# Patient Record
Sex: MALE | Race: WHITE | URBAN - METROPOLITAN AREA
[De-identification: names, ages, dates, MRNs, and addresses within clinical notes are randomized per-mention and may not be internally consistent; named-entity substitution may affect disease eponyms.]

---

## 2017-01-06 ENCOUNTER — MEDICAL CORRESPONDENCE (OUTPATIENT)
Dept: HEALTH INFORMATION MANAGEMENT | Facility: CLINIC | Age: 57
End: 2017-01-06

## 2017-01-11 ENCOUNTER — OFFICE VISIT (OUTPATIENT)
Dept: SURGERY | Facility: CLINIC | Age: 57
End: 2017-01-11
Attending: THORACIC SURGERY (CARDIOTHORACIC VASCULAR SURGERY)
Payer: COMMERCIAL

## 2017-01-11 VITALS
HEART RATE: 86 BPM | SYSTOLIC BLOOD PRESSURE: 145 MMHG | OXYGEN SATURATION: 97 % | WEIGHT: 177.3 LBS | TEMPERATURE: 98.2 F | DIASTOLIC BLOOD PRESSURE: 85 MMHG | HEIGHT: 70 IN | RESPIRATION RATE: 20 BRPM | BODY MASS INDEX: 25.38 KG/M2

## 2017-01-11 DIAGNOSIS — C15.5 MALIGNANT NEOPLASM OF LOWER THIRD OF ESOPHAGUS (H): Primary | ICD-10-CM

## 2017-01-11 PROCEDURE — 99212 OFFICE O/P EST SF 10 MIN: CPT | Mod: ZF

## 2017-01-11 RX ORDER — LORAZEPAM 0.5 MG/1
.5-1 TABLET ORAL
COMMUNITY
Start: 2017-01-05 | End: 2017-04-07

## 2017-01-11 RX ORDER — ONDANSETRON 8 MG/1
8 TABLET, FILM COATED ORAL
COMMUNITY
Start: 2017-01-05 | End: 2017-04-07

## 2017-01-11 RX ORDER — PROCHLORPERAZINE MALEATE 10 MG
10 TABLET ORAL
COMMUNITY
Start: 2017-01-05 | End: 2017-04-07

## 2017-01-11 RX ORDER — DEXAMETHASONE 4 MG/1
4 TABLET ORAL
COMMUNITY
Start: 2017-01-05 | End: 2017-04-07

## 2017-01-11 RX ORDER — LISINOPRIL 5 MG/1
5 TABLET ORAL EVERY EVENING
COMMUNITY
Start: 2016-03-22

## 2017-01-11 NOTE — NURSING NOTE
"Jim Braden is a 56 year old male who presents for:  Chief Complaint   Patient presents with     Oncology Clinic Visit     Malignant Neoplasm of lower third of esophagus        Initial Vitals:  /85 mmHg  Pulse 86  Temp(Src) 98.2  F (36.8  C) (Oral)  Resp 20  Ht 1.778 m (5' 10\")  Wt 80.423 kg (177 lb 4.8 oz)  BMI 25.44 kg/m2  SpO2 97% Estimated body mass index is 25.44 kg/(m^2) as calculated from the following:    Height as of this encounter: 1.778 m (5' 10\").    Weight as of this encounter: 80.423 kg (177 lb 4.8 oz).. Body surface area is 1.99 meters squared. BP completed using cuff size: regular  Data Unavailable No LMP for male patient. Allergies and medications reviewed.     Medications: Medication refills not needed today.  Pharmacy name entered into EPIC: Data Unavailable    Comments:     6 minutes for nursing intake (face to face time)   Rebecca Pastrana CMA          "

## 2017-01-11 NOTE — PROGRESS NOTES
THORACIC SURGERY - NEW PATIENT OFFICE VISIT      Dear Dr. Dan,    I saw Mr. Bradenin consultation for the evaluation and treatment of an adenocarcinoma of the lower third of the esophagus.    HPI  56 year old year-old male with node (+), distal esophageal poorly differentiated adenocarcinoma. He has dysphagia but is managing with dietary adjustments.    Previsit Tests   12/12/16 EGD: upper endoscopy revealed distal esophageal mass, biopsy proven poorly differentiated adenocarcinoma.  12/22/2016 CT-PET : PET + distal esophageal mass with regional adenopathy    12/20/16 EUS: friable fungating mass extending into the cardia, 5cm in length, 15mm thickness, with invasion into periesophageal tissue. At least 10 suspicious nodes (left gastric, peripancreatic, paraesophageal, largest 2.4cm); smaller node sampled and was + for adenocarcioma. uT3N3    PMH  Poorly differentiated esophageal adenocarcinoma  GERD  HTN  Intermittent chronic back pain  History of appendectomy    NKDA    ETOH 2-3 beers/day  TOB quit 10 years ago    Physical examination  BMI   Port in place, otherwise non-contributory. No palpable cervical LN.    From a personal perspective, he is a .    IMPRESSION (C15.5) Malignant neoplasm of lower third of esophagus (H)  (primary encounter diagnosis)    56 year old year-old male with node clinical stage IIIC distal esophageal adenocarcinoma (Siewert II)    PLAN  I spent a total of 30 minutes with Mr. Braden, more than 50% of which were spent in counseling, coordination of care, and face-to-face time. I reviewed the plan as follows:  1) Follow-up in clinic with 1 month after completion of neoadjuvant therapy with a repeat PET-CT and PFT  2) Stop alcohol consumption at least 3 weeks prior to surgery  3) Surgery: we briefly discussed the procedure (esophagectomy) and expected hospital stay and recovery. We will revisit the subject at length when he returns to clinic.    All expressed questions were  answered and all parties present were in agreement with the plan.  I appreciate the opportunity to participate in the care of your patient and will keep you updated.  Sincerely,

## 2017-01-11 NOTE — Clinical Note
1/11/2017       RE: Jim Braden  [address] [address]     Dear Colleague,    Thank you for referring your patient, Jim Braden, to the [facility]. Please see a copy of my visit note below.    THORACIC SURGERY - NEW PATIENT OFFICE VISIT      Dear Dr. Dan,    I saw Mr. Bradenin consultation for the evaluation and treatment of a lower esophageal neoplasm.     HPI  56 year old year-old male with node (+), distal esophageal poorly differentiated adenocarcinoma.     Previsit Tests   12/12/16: upper endoscopy revealed distal esophageal mass, biopsy proven poorly differentiated adenocarcinoma.  CT Chest/abdomen/pelvis: distal esophageal mass with regional adenopathy in the lesser sac and portal caval nodes.  12/20/16 EUS: friable fungating mass extending into the cardia, 5cm in length, 15mm thickness, with invasion into periesophageal tissue. At least 10 suspicious nodes (left gastric, peripancreatic, paraesophageal, largest 2.4cm); smaller node sampled and was + for adenocarcioma.  PMH  Poorly differentiated esophageal adenocarcinoma  GERD  HTN  Intermittent chronic back pain  History of appendectomy    NKDA    FH: amternal uncle had esophageal cancer    No past medical history on file.     ETOH 2-3 beers/day  TOB quit 10 years ago    Physical examination  BMI   Port (placed at [facility])    From a personal perspective, . He is a .    IMPRESSION No diagnosis found.   56 year old year-old male with node (+), distal esophageal poorly differentiated adenocarcinoma    PLAN  I spent a total of *** minutes with Mr. Braden and ***, more than 50% of which were spent in counseling, coordination of care, and face-to-face time. I reviewed the plan as follows:  1.) Procedure planned: ***.  Necessary Preop Tests & Appointments: ***  Regional Anesthesia Plan: ***  Anticoagulation Plan: ***  Smoking Cessation: ***    All expressed questions were answered and all parties present were in  agreement with the plan.  I appreciate the opportunity to participate in the care of your patient and will keep you updated.  Sincerely,    Again, thank you for allowing me to participate in the care of your patient.      Sincerely,    Jeronimo Mendez MD

## 2017-01-11 NOTE — Clinical Note
1/11/2017    RE: Jim Braden  [address] [address]     THORACIC SURGERY - NEW PATIENT OFFICE VISIT      Dear Dr. Dan,    I saw Mr. Bradenin consultation for the evaluation and treatment of an adenocarcinoma of the lower third of the esophagus.    HPI  56 year old year-old male with node (+), distal esophageal poorly differentiated adenocarcinoma. He has dysphagia but is managing with dietary adjustments.    Previsit Tests   12/12/16 EGD: upper endoscopy revealed distal esophageal mass, biopsy proven poorly differentiated adenocarcinoma.  12/22/2016 CT-PET : PET + distal esophageal mass with regional adenopathy    12/20/16 EUS: friable fungating mass extending into the cardia, 5cm in length, 15mm thickness, with invasion into periesophageal tissue. At least 10 suspicious nodes (left gastric, peripancreatic, paraesophageal, largest 2.4cm); smaller node sampled and was + for adenocarcioma. uT3N3    PMH  Poorly differentiated esophageal adenocarcinoma  GERD  HTN  Intermittent chronic back pain  History of appendectomy    NKDA    ETOH 2-3 beers/day  TOB quit 10 years ago    Physical examination  BMI   Port in place, otherwise non-contributory. No palpable cervical LN.    From a personal perspective, he is a .    IMPRESSION (C15.5) Malignant neoplasm of lower third of esophagus (H)  (primary encounter diagnosis)    56 year old year-old male with node clinical stage IIIC distal esophageal adenocarcinoma (Siewert II)    PLAN  I spent a total of 30 minutes with Mr. Braden, more than 50% of which were spent in counseling, coordination of care, and face-to-face time. I reviewed the plan as follows:  1) Follow-up in clinic with 1 month after completion of neoadjuvant therapy with a repeat PET-CT and PFT  2) Stop alcohol consumption at least 3 weeks prior to surgery  3) Surgery: we briefly discussed the procedure (esophagectomy) and expected hospital stay and recovery. We will revisit the subject  at length when he returns to clinic.    All expressed questions were answered and all parties present were in agreement with the plan.  I appreciate the opportunity to participate in the care of your patient and will keep you updated.  Sincerely,    Jeronimo Mendez MD

## 2017-03-14 NOTE — PROGRESS NOTES
THORACIC SURGERY FOLLOW UP VISIT     Dear Dr. Dan,   I saw Mr. Braden in follow-up today. The clinical summary follows:      PREOP DIAGNOSIS   Clinical stage IIIC (uT3N3) Siewert II adenocarcinoma of the lower third of the esophagus    NEOADJUVANT THERAPY  Folfox every 2 weeks x4 cycles (completed 2/15/2017)    COMPLICATIONS FROM NEOADJUVANT THERAPY  Memory difficulties  Peripheral neuropathy (improving)    HISTOPATHOLOGY   12/20/2016 EUS FNA left gastric LN positive for esophageal adenocarcinoma, uT3N3  12/12/2016 EGD esophageal biopsy with poorly differentiated adenocarcinoma     INTERVAL STUDIES  PET CT 2/23/2017 ([facility] report): complete response    PET CT 12/22/2016: cancer at the GE junction with at least 5 LN metastases       ETOH 1 beer daily  TOB former smoker, quit 2007  BMI 26     PMH  Poorly differentiated esophageal adenocarcinoma  Left distal DVT  GERD  HTN  Intermittent chronic back pain  History of open appendectomy     SUBJECTIVE   Mr. Braden is doing well. His dysphagia has completely resolved and he is eating without restriction; he has gained ~10 pounds. He is here to discuss esophagectomy further after completing neoadjuvant chemotherapy. He continues to drink 1 beer daily but has cut down.     From a personal perspective, he is here with his wife, Abril. He works as a . He lives locally with his wife and his children.     IMPRESSION (C15.5) Primary adenocarcinoma of distal third of esophagus (H)  (primary encounter diagnosis)    64 year-old male with clinical stage IIIC Siewert II adenocarcinoma of the esophagus s/p neoadjuvant chemotherapy.     PLAN  I spent a total of 25 min with Mr. Braden and his wife, more than half were spent in counseling, coordination of care, and face-to-face time. I reviewed the plan as follows:  1) Procedure planned: Minimally invasive 3-hole esophagectomy, possible open (I anticipate the abdominal dissection to be more challenging than the chest  dissection and his body habitus is favorable for a cervical anastomosis). I reviewed the indications, risks, and benefits of the procedure with . Jim Braden and his wife, Abril. We discussed the intraoperative risks of bleeding, injury to vital organs, and potential for esophageal discontinuity.  Potential postoperative complications include, but are not limited to, major respiratory events, arrhythmia, bleeding, infection, reoperation, anastomotic leak, conduit necrosis with discontinuity, vocal cord palsy, and death. I explained the anticipated hospital course (10-14 days) and postoperative recovery including pain control, tube feedings, dietary changes, and overall drain management. We discussed the importance of lifetime awareness to limit lifting heavy weights to prevent hiatal herniation as well as the need for aspiration precautions.  2) PFT  3) PAC  4) Perioperative DVT prophylaxis: enoxaparin in preop holding and postoperatively  5) Pain management: intraoperative liposomal bupivacaine injection  They had all their questions answered and were in agreement with the plan.  I appreciate the opportunity to participate in the care of your patient and will keep you updated.  Sincerely,

## 2017-03-15 ENCOUNTER — OFFICE VISIT (OUTPATIENT)
Dept: SURGERY | Facility: CLINIC | Age: 57
End: 2017-03-15
Attending: THORACIC SURGERY (CARDIOTHORACIC VASCULAR SURGERY)
Payer: COMMERCIAL

## 2017-03-15 VITALS
WEIGHT: 180.5 LBS | TEMPERATURE: 98.6 F | SYSTOLIC BLOOD PRESSURE: 161 MMHG | BODY MASS INDEX: 25.84 KG/M2 | OXYGEN SATURATION: 98 % | HEIGHT: 70 IN | DIASTOLIC BLOOD PRESSURE: 86 MMHG | HEART RATE: 94 BPM | RESPIRATION RATE: 18 BRPM

## 2017-03-15 DIAGNOSIS — C15.5 PRIMARY ADENOCARCINOMA OF DISTAL THIRD OF ESOPHAGUS (H): Primary | ICD-10-CM

## 2017-03-15 PROCEDURE — 40000114 ZZH STATISTIC NO CHARGE CLINIC VISIT

## 2017-03-15 ASSESSMENT — PAIN SCALES - GENERAL: PAINLEVEL: NO PAIN (0)

## 2017-03-15 NOTE — MR AVS SNAPSHOT
"              After Visit Summary   3/15/2017    Jim Braden    MRN: [id number]           Patient Information     Date Of Birth          1960        Visit Information        Provider Department      3/15/2017 5:15 PM Jeronimo Mendez MD [facility]        Today's Diagnoses     Primary adenocarcinoma of distal third of esophagus (H)    -  1       Follow-ups after your visit        Who to contact     If you have questions or need follow up information about today's clinic visit or your schedule please contact [facility] directly at [phone].  Normal or non-critical lab and imaging results will be communicated to you by [employer], letter or phone within 4 business days after the clinic has received the results. If you do not hear from us within 7 days, please contact the clinic through [employer] or phone. If you have a critical or abnormal lab result, we will notify you by phone as soon as possible.  Submit refill requests through [employer] or call your pharmacy and they will forward the refill request to us. Please allow 3 business days for your refill to be completed.          Additional Information About Your Visit        MyChart Information     [employer] lets you send messages to your doctor, view your test results, renew your prescriptions, schedule appointments and more. To sign up, go to [email or web address] . Click on \"Log in\" on the left side of the screen, which will take you to the Welcome page. Then click on \"Sign up Now\" on the right side of the page.     You will be asked to enter the access code listed below, as well as some personal information. Please follow the directions to create your username and password.     Your access code is: [email or web address]  Expires: 4/10/2017  1:20 PM     Your access code will  in 90 days. If you need help or a new code, please call your [place] clinic or [phone].        Care EveryWhere ID     This is your Care " "EveryWhere ID. This could be used by other organizations to access your [facility] medical records  [id number]        Your Vitals Were     Pulse Temperature Respirations Height Pulse Oximetry BMI (Body Mass Index)    94 98.6  F (37  C) (Oral) 18 1.778 m (5' 10\") 98% 25.9 kg/m2       Blood Pressure from Last 3 Encounters:   03/15/17 161/86   01/11/17 145/85    Weight from Last 3 Encounters:   03/15/17 81.9 kg (180 lb 8 oz)   01/11/17 80.4 kg (177 lb 4.8 oz)              Today, you had the following     No orders found for display       Primary Care Provider Office Phone # Fax #    Edith Dan [phone]       [place] [address] [address] [facility] [address]        Thank you!     Thank you for choosing [facility]  for your care. Our goal is always to provide you with excellent care. Hearing back from our patients is one way we can continue to improve our services. Please take a few minutes to complete the written survey that you may receive in the mail after your visit with us. Thank you!             Your Updated Medication List - Protect others around you: Learn how to safely use, store and throw away your medicines at www.disposemymeds.org.          This list is accurate as of: 3/15/17 11:59 PM.  Always use your most recent med list.                   Brand Name Dispense Instructions for use    dexamethasone 4 MG tablet    DECADRON     Take 4 mg by mouth Reported on 3/15/2017       enoxaparin 80 MG/0.8ML injection    LOVENOX     Inject 80 mg Subcutaneous 2 times daily       lisinopril 5 MG tablet    PRINIVIL/ZESTRIL     Take 5 mg by mouth Reported on 3/15/2017       LORazepam 0.5 MG tablet    ATIVAN     Take 0.5-1 mg by mouth Reported on 3/15/2017       omeprazole 20 MG CR capsule    priLOSEC     Take 20 mg by mouth Reported on 3/15/2017       ondansetron 8 MG tablet    ZOFRAN     Take 8 mg by mouth Reported on 3/15/2017       prochlorperazine 10 MG tablet    " COMPAZINE     Take 10 mg by mouth Reported on 3/15/2017

## 2017-03-15 NOTE — LETTER
3/15/2017      RE: Jim Braden  [address] [address]       THORACIC SURGERY FOLLOW UP VISIT     Dear Dr. Dan,   I saw Mr. Braden in follow-up today. The clinical summary follows:      PREOP DIAGNOSIS   Clinical stage IIIC (uT3N3) Siewert II adenocarcinoma of the lower third of the esophagus    NEOADJUVANT THERAPY  Folfox every 2 weeks x4 cycles (completed 2/15/2017)    COMPLICATIONS FROM NEOADJUVANT THERAPY  Memory difficulties  Peripheral neuropathy (improving)    HISTOPATHOLOGY   12/20/2016 EUS FNA left gastric LN positive for esophageal adenocarcinoma, uT3N3  12/12/2016 EGD esophageal biopsy with poorly differentiated adenocarcinoma     INTERVAL STUDIES  PET CT 2/23/2017 ([facility] report): complete response    PET CT 12/22/2016: cancer at the GE junction with at least 5 LN metastases       ETOH 1 beer daily  TOB former smoker, quit 2007  BMI 26     PMH  Poorly differentiated esophageal adenocarcinoma  Left distal DVT  GERD  HTN  Intermittent chronic back pain  History of open appendectomy     SUBJECTIVE   Mr. Braden is doing well. His dysphagia has completely resolved and he is eating without restriction; he has gained ~10 pounds. He is here to discuss esophagectomy further after completing neoadjuvant chemotherapy. He continues to drink 1 beer daily but has cut down.     From a personal perspective, he is here with his wife, Abril. He works as a . He lives locally with his wife and his children.     IMPRESSION (C15.5) Primary adenocarcinoma of distal third of esophagus (H)  (primary encounter diagnosis)    64 year-old male with clinical stage IIIC Siewert II adenocarcinoma of the esophagus s/p neoadjuvant chemotherapy.     PLAN  I spent a total of 25 min with Mr. Braden and his wife, more than half were spent in counseling, coordination of care, and face-to-face time. I reviewed the plan as follows:  1) Procedure planned: Minimally invasive 3-hole esophagectomy, possible open (I  anticipate the abdominal dissection to be more challenging than the chest dissection and his body habitus is favorable for a cervical anastomosis). I reviewed the indications, risks, and benefits of the procedure with . Jim Braden and his wife, Abril. We discussed the intraoperative risks of bleeding, injury to vital organs, and potential for esophageal discontinuity.  Potential postoperative complications include, but are not limited to, major respiratory events, arrhythmia, bleeding, infection, reoperation, anastomotic leak, conduit necrosis with discontinuity, vocal cord palsy, and death. I explained the anticipated hospital course (10-14 days) and postoperative recovery including pain control, tube feedings, dietary changes, and overall drain management. We discussed the importance of lifetime awareness to limit lifting heavy weights to prevent hiatal herniation as well as the need for aspiration precautions.  2) PFT  3) PAC  4) Perioperative DVT prophylaxis: enoxaparin in preop holding and postoperatively  5) Pain management: intraoperative liposomal bupivacaine injection  They had all their questions answered and were in agreement with the plan.  I appreciate the opportunity to participate in the care of your patient and will keep you updated.  Sincerely,      Jeronimo Mendez MD

## 2017-03-16 DIAGNOSIS — C15.9 MALIGNANT NEOPLASM OF ESOPHAGUS, UNSPECIFIED LOCATION (H): Primary | ICD-10-CM

## 2017-03-16 RX ORDER — FLUCONAZOLE 2 MG/ML
200 INJECTION, SOLUTION INTRAVENOUS EVERY 24 HOURS
Status: CANCELLED | OUTPATIENT
Start: 2017-03-16 | End: 2017-03-17

## 2017-03-16 RX ORDER — PIPERACILLIN SODIUM, TAZOBACTAM SODIUM 3; .375 G/15ML; G/15ML
3.38 INJECTION, POWDER, LYOPHILIZED, FOR SOLUTION INTRAVENOUS EVERY 6 HOURS
Status: CANCELLED | OUTPATIENT
Start: 2017-03-16

## 2017-04-05 ENCOUNTER — ANESTHESIA EVENT (OUTPATIENT)
Dept: SURGERY | Facility: CLINIC | Age: 57
DRG: 357 | End: 2017-04-05
Payer: COMMERCIAL

## 2017-04-07 ENCOUNTER — OFFICE VISIT (OUTPATIENT)
Dept: SURGERY | Facility: CLINIC | Age: 57
End: 2017-04-07

## 2017-04-07 ENCOUNTER — ALLIED HEALTH/NURSE VISIT (OUTPATIENT)
Dept: SURGERY | Facility: CLINIC | Age: 57
End: 2017-04-07

## 2017-04-07 VITALS
SYSTOLIC BLOOD PRESSURE: 165 MMHG | HEART RATE: 79 BPM | RESPIRATION RATE: 16 BRPM | BODY MASS INDEX: 25.9 KG/M2 | DIASTOLIC BLOOD PRESSURE: 94 MMHG | HEIGHT: 71 IN | OXYGEN SATURATION: 97 % | TEMPERATURE: 97.9 F | WEIGHT: 185 LBS

## 2017-04-07 DIAGNOSIS — Z01.818 PREOP EXAMINATION: ICD-10-CM

## 2017-04-07 DIAGNOSIS — Z01.818 PREOP EXAMINATION: Primary | ICD-10-CM

## 2017-04-07 DIAGNOSIS — C15.9 ESOPHAGEAL CANCER (H): Primary | ICD-10-CM

## 2017-04-07 LAB
ABO + RH BLD: NORMAL
ABO + RH BLD: NORMAL
ANION GAP SERPL CALCULATED.3IONS-SCNC: 8 MMOL/L (ref 3–14)
BLD GP AB SCN SERPL QL: NORMAL
BLOOD BANK CMNT PATIENT-IMP: NORMAL
BLOOD BANK CMNT PATIENT-IMP: NORMAL
BUN SERPL-MCNC: 13 MG/DL (ref 7–30)
CALCIUM SERPL-MCNC: 9.4 MG/DL (ref 8.5–10.1)
CHLORIDE SERPL-SCNC: 106 MMOL/L (ref 94–109)
CO2 SERPL-SCNC: 28 MMOL/L (ref 20–32)
CREAT SERPL-MCNC: 0.89 MG/DL (ref 0.66–1.25)
DLCOUNC-%PRED-PRE: 96 %
DLCOUNC-PRE: 28.92 ML/MIN/MMHG
DLCOUNC-PRED: 29.93 ML/MIN/MMHG
ERV-%PRED-PRE: 19 %
ERV-PRE: 0.28 L
ERV-PRED: 1.44 L
ERYTHROCYTE [DISTWIDTH] IN BLOOD BY AUTOMATED COUNT: 14.3 % (ref 10–15)
EXPTIME-PRE: 7.65 SEC
FEF2575-%PRED-PRE: 69 %
FEF2575-PRE: 2.22 L/SEC
FEF2575-PRED: 3.18 L/SEC
FEFMAX-%PRED-PRE: 106 %
FEFMAX-PRE: 10.45 L/SEC
FEFMAX-PRED: 9.77 L/SEC
FEV1-%PRED-PRE: 90 %
FEV1-PRE: 3.26 L
FEV1FEV6-PRE: 72 %
FEV1FEV6-PRED: 80 %
FEV1FVC-PRE: 72 %
FEV1FVC-PRED: 79 %
FEV1SVC-PRE: 67 %
FEV1SVC-PRED: 68 %
FIFMAX-PRE: 5.34 L/SEC
FRCPLETH-%PRED-PRE: 89 %
FRCPLETH-PRE: 3.24 L
FRCPLETH-PRED: 3.63 L
FVC-%PRED-PRE: 99 %
FVC-PRE: 4.54 L
FVC-PRED: 4.59 L
GFR SERPL CREATININE-BSD FRML MDRD: 88 ML/MIN/1.7M2
GLUCOSE SERPL-MCNC: 108 MG/DL (ref 70–99)
HCT VFR BLD AUTO: 41.2 % (ref 40–53)
HGB BLD-MCNC: 14.1 G/DL (ref 13.3–17.7)
IC-%PRED-PRE: 119 %
IC-PRE: 4.6 L
IC-PRED: 3.84 L
INR PPP: 1.01 (ref 0.86–1.14)
MCH RBC QN AUTO: 32.3 PG (ref 26.5–33)
MCHC RBC AUTO-ENTMCNC: 34.2 G/DL (ref 31.5–36.5)
MCV RBC AUTO: 94 FL (ref 78–100)
MVV-%PRED-PRE: 117 %
MVV-PRE: 176 L/MIN
MVV-PRED: 150 L/MIN
PLATELET # BLD AUTO: 214 10E9/L (ref 150–450)
POTASSIUM SERPL-SCNC: 4.6 MMOL/L (ref 3.4–5.3)
RBC # BLD AUTO: 4.37 10E12/L (ref 4.4–5.9)
RVPLETH-%PRED-PRE: 126 %
RVPLETH-PRE: 2.96 L
RVPLETH-PRED: 2.35 L
SODIUM SERPL-SCNC: 141 MMOL/L (ref 133–144)
SPECIMEN EXP DATE BLD: NORMAL
TLCPLETH-%PRED-PRE: 106 %
TLCPLETH-PRE: 7.84 L
TLCPLETH-PRED: 7.33 L
VA-%PRED-PRE: 83 %
VA-PRE: 5.84 L
VC-%PRED-PRE: 92 %
VC-PRE: 4.88 L
VC-PRED: 5.28 L
WBC # BLD AUTO: 5.8 10E9/L (ref 4–11)

## 2017-04-07 ASSESSMENT — ENCOUNTER SYMPTOMS: ORTHOPNEA: 0

## 2017-04-07 ASSESSMENT — LIFESTYLE VARIABLES: TOBACCO_USE: 1

## 2017-04-07 NOTE — PATIENT INSTRUCTIONS
Preparing for Your Surgery      Name:  Jim Braden   MRN:  [id number]   :  1960   Today's Date:  2017     Arriving for surgery:  Surgery date:  17  Surgery time:  7:30 am  Arrival time:  5:30 am    Please come to:   [facility] [address]  [address] [place]  [address]    -   parking is available in front of the hospital from 5:15 am to 8:00 pm    -  Stop at the Information Desk in the lobby    -   Inform the information person that you are here for surgery. An escort to 3C will be provided. If you would not like an escort, please proceed to 3C on the 3rd floor. [phone]   -  Bring your ID and insurance card.    What can I eat or drink?  -  You may have solid food or milk products until 8 hours prior to your surgery. (11:30 pm 17)  -  You may have water, apple juice, BLACK coffee (NO creamer or nondairy creamer), or 7up/Sprite until 2 hours prior to your surgery. (5:30 am)    Which medicines can I take?        -  Last Enoxaparin dose prior to surgery is on 17 the AM dose. Take prior to 7:30 am.         (Hold Enoxaparin 17 PM dose and HOLD the day of surgery.)  -  Do not bring your own medications to the hospital.    -  Please take these medications the day of surgery:  Omeprazole      Acetaminophen (Tylenol) if needed    How do I prepare myself?  -  Take two showers: one the night before surgery; and one the morning of surgery.         Use Scrubcare or Hibiclens to wash from neck down.  You may use your own shampoo and conditioner. No other hair products.   -  Do NOT use lotion, powder, deodorant, or antiperspirant the day of your surgery.  -  Do NOT wear any jewelry.    -  Begin using Incentive Spirometer 1 week prior to surgery.  Use 4 times per day, up to 5-10 breaths each time.  Bring Incentive Spirometer to hospital.    Questions or Concerns:  If you have questions or concerns, please call the  Preoperative Assessment Center,  Monday-Friday 7AM-7PM:  [phone]    AFTER YOUR SURGERY  Breathing exercises   Breathing exercises help you recover faster. Take deep breaths and let the air out slowly. This will:     Help you wake up after surgery.    Help prevent complications like pneumonia.  Preventing complications will help you go home sooner.   Nausea and vomiting   You may feel sick to your stomach after surgery; if so, let your nurse know.    Pain control:  After surgery, you may have pain. Our goal is to help you manage your pain. Pain medicine will help you feel comfortable enough to do activities that will help you heal.  These activities may include breathing exercises, walking and physical therapy.   To help your health care team treat your pain we will ask: 1) If you have pain  2) where it is located 3) describe your pain in your words  Methods of pain control include medications given by mouth, vein or by nerve block for some surgeries.  We may give you a pain control pump that will:  1) Deliver the medicine through a tube placed in your vein  2) Control the amount of medicine you receive  3) Allow you to push a button to deliver a dose of pain medicine  Sequential Compression Device (SCD) or Pneumo Boots:  You may need to wear SCD S on your legs or feet. These are wraps connected to a machine that pumps in air and releases it. The repeated pumping helps prevent blood clots from forming.

## 2017-04-07 NOTE — PROGRESS NOTES
Preoperative Assessment Center medication history for April 7, 2017 is complete.  See Epic admission navigator for allergy information, pharmacy, prior to admission medications and immunization status.    Operating room staff will still need to confirm medications and last dose information on day of surgery.     Medication history interview sources:  patient    Changes made to PTA medication list (reason)  Added: acetaminophen PRN  Deleted: dexamethasone, lorazepam, prochlorperazine, ondansetron - pt not taking any longer   Changed:  Omeprazole, lisinopril - updated sig,     Additional medication history information (including reliability of information, actions taken by pharmacist):None      Prior to Admission medications    Medication Sig Last Dose Taking? Auth Provider   ACETAMINOPHEN PO Take 500-1,000 mg by mouth every 8 hours as needed for pain Taking Yes Unknown, Entered By History   enoxaparin (LOVENOX) 80 MG/0.8ML injection Inject 80 mg Subcutaneous 2 times daily Taking Yes Reported, Patient   omeprazole (PRILOSEC) 20 MG CR capsule Take 20 mg by mouth every morning Reported on 3/15/2017 Taking Yes Reported, Patient   lisinopril (PRINIVIL/ZESTRIL) 5 MG tablet Take 5 mg by mouth every evening Reported on 3/15/2017 Taking Yes Reported, Patient         Medication history completed by: Miguel Morgan [facility]

## 2017-04-07 NOTE — PHARMACY - PREOPERATIVE ASSESSMENT CENTER
ANTICOAGULATION DOCUMENTATION - Preoperative Assessment Center (PAC) Pharmacist   Patient seen and interviewed during time of PAC Clinic appointment April 7, 2017.     Based on profile review and patient interview Jim Braden has been on enoxaparin 80 mg SQ q12 hr for treatment of DVT in RLE (peroneal vein) since February, 2017.  Current dose of enoxaparin 80 mg SQ q12 hours.    It is prescribed by Dr. Hitchcock with [facility] in [place], [place].  The expected duration of therapy is indefinite.    Jim Braden is scheduled for surgery on 4/14/17 with Dr. Mendez for esophagectomy and the perioperative anticoagulation plan outlined by Dr[facility]'s staff [facility] is hold enoxaparin for 24 hr before surgery (eg. Hold the dose the evening before and the morning of the procedure).  Last dose of the enoxaparin should be the morning of 4/13 and dose should be taken 7AM or earlier on that date.  Resumption of anticoagulation postoperatively as well as any preoperative anticoagulation will be per Dr. Mendez and his team.  Dr Rene's RN Filomena was also notified of this plan and when Dr. Rene returns on Monday will connect with Dr. Mendez's staff directly if there are any further questions regarding the anticoagulation plan.      This plan may require re-assessment and modification by his primary team in the perioperative setting depending on patients clinical situation.        Miguel Morgan [facility]  April 7, 2017  11:14 AM

## 2017-04-07 NOTE — ANESTHESIA PREPROCEDURE EVALUATION
Anesthesia Evaluation     . Pt has had prior anesthetic. Type: General and MAC    No history of anesthetic complications          ROS/MED HX    ENT/Pulmonary:     (+)tobacco use, Past use Quit in 2007 packs/day  , . Other pulmonary disease Alpha 1 antitrypsin deficiency carrier.    Neurologic:  - neg neurologic ROS     Cardiovascular:     (+) hypertension-range: 140s/80s, ---. Taking blood thinners Pt has received instructions: Instructions Given to patient: Patient instructed to hold Lovenox in preparation for surgery. . . :. .      (-) JUNIOR and orthopnea/PND   METS/Exercise Tolerance:  >4 METS   Hematologic: Comments: Blood clot in right leg, diagnosed in January/February.  Thought to be related to chemotherapy.    (+) History of blood clots pt is anticoagulated, -      Musculoskeletal:  - neg musculoskeletal ROS       GI/Hepatic:     (+) GERD Asymptomatic on medication,       Renal/Genitourinary:  - ROS Renal section negative       Endo:  - neg endo ROS       Psychiatric:  - neg psychiatric ROS       Infectious Disease:  - neg infectious disease ROS       Malignancy:   (+) Malignancy History of GI  GI CA  Active status post Chemo,         Other:    - neg other ROS                 Physical Exam      Airway   Mallampati: I  TM distance: >3 FB  Neck ROM: full    Dental   (+) partials    Cardiovascular   Rhythm and rate: regular and normal  (-) carotid bruit is not present and no peripheral edema    Pulmonary    breath sounds clear to auscultation    Other findings: 4/7/17: WBC 5.8; Hgb 14.1; Hct 41.2; Plt 214; INR 1.01  4/7/17: Na 141; K 4.6; Cl 106; Glu 108; BUN 13; Cr 0.89; Ca 9.4    12/12/16 CT Chest/Abdomen/Pelvis:  CONCLUSION:  1. Distal esophageal mass with abnormal local regional lymphadenopathy in the lesser sac and possibly the portacaval chains. ?    12/22/16 PET:  INDICATION: Esophageal cancer staging, initial treatment strategy.?  TECHNIQUE: Serum glucose level 86 mg/dL. One hour post right antecubital  intravenous administration of 11.1 mCi F-18 FDG, PET imaging was performed from the skull base to the mid thighs utilizing attenuation correction with?  concurrent axial CT and PET/CT image fusion. Dose reduction techniques were used.  COMPARISON: CT from 12/12/2016 is reviewed.  FINDINGS:  HEAD AND NECK: Negative.  CHEST: Negative.  ABDOMEN/PELVIS: Markedly FDG avid (SUVmax 19.5) circumferential wall thickening at the gastroesophageal junction spans about 7 cm craniocaudally. At least 4 FDG avid gastrohepatic ligament lymph nodes, the  largest of which measures 2.4 x 2.1 cm (SUVmax 8.8). 1.5 x 0.8 cm (SUVmax 3.1) lymph node posterior to the gastric fundus. Splenule. Trace calcified atherosclerosis. Pelvic phleboliths.  MUSCULOSKELETAL: Mild inflammatory FDG activity about the trochanteric bursae. Minimal degenerative changes in the spine.?  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 at least 5 lymph node metastases in the upper abdomen above the level of the celiac axis             PAC Discussion and Assessment    ASA Classification: 3  Case is suitable for: [place]  Anesthetic techniques and relevant risks discussed: GA  Invasive monitoring and risk discussed: Yes  Types:   Possibility and Risk of blood transfusion discussed: Yes  NPO instructions given:   Additional anesthetic preparation and risks discussed:   Needs early admission to pre-op area:   Other:     PAC Resident/NP Anesthesia Assessment:  Jim Braden is a 56 year old male scheduled to undergo Thoracoscopic Laparoscopic Esophagectomy, Laparoscopic Jejunostomy Tube, Upper Endoscopy, Possible Open on 4/14/17 with Dr. Jeronimo Mendez.    He has the following specific operative considerations:   1.  GERD: Patient instructed to take Prilosec as prescribed.  Consider use of RSI techniques with advanced airway maneuvers.  2.  History of DVT, anticoagulated with Lovenox: Patient instructed to hold Lovenox in preparation  for surgery.  Recommend that this be resumed as soon as possible after surgery.  3.  HTN: Patient instructed to hold Lisinopril the AM of surgery for renal protection.    4.  EKG, type & screen, CBC, BMP today.    Revised Cardiac Risk Index: 0.9% risk of major adverse cardiac event.  Anesthesia considerations: Refer to PAC assessment in the anesthesia records.  VTE risk: 4.5%  ROSA risk: Intermediate  PONV risk score= 1.  (If > 2, anti-emetic intervention is recommended.)        Reviewed and Signed by PAC Mid-Level Provider/Resident  Mid-Level Provider/Resident: Kayli Urbina CNP  Date: 4/7/17  Time: 1803    Attending Anesthesiologist Anesthesia Assessment:  56 year old for thoracoscopic, laparoscopic esophagectomy and jejunostomy. Chart reviewed, patient seen and evaluated; agree with above assessment. Patient does not have significant cardiac history. He is alpha 1 anti trypsinase carrier but  pulmonary PFTs are normal. Patient has history of DVT and is on lovenox, will hold appropriately before surgery.     Patient is appropriate for the planned procedure without further workup or medical management change. The final anesthesia plan will be determined by the physician anesthesiologist caring for the patient on the day of surgery.      Reviewed and Signed by PAC Anesthesiologist  Anesthesiologist: [email or web address]  Date: 4/7/2017  Time:   Pass/Fail: Pass  Disposition:     PAC Pharmacist Assessment:        Pharmacist:   Date:   Time:      Anesthesia Plan      History & Physical Review  History and physical reviewed and following examination; no interval change.    ASA Status:  3 .    NPO Status:  > 8 hours    Plan for General with Intravenous induction. Maintenance will be Balanced.    PONV prophylaxis:  Ondansetron (or other 5HT-3)  Additional equipment: Videolaryngoscope, Arterial Line, Central Line, Double Lumen ETT and 2nd IV I have examined the patient and reviewed the chart.    I have discussed the above plan with the  patient and he agrees to proceed.    Plan for postop analgesia is intercostal nerve blocks by Dr. Mendez and preemptive analgesia by anesthesia team to include IV acetaminophen, ketamine, and dexmetotomidine.    Will we use goal directed fluid therapy with a FloTrac device.    Plan if for post op ICU admission.    Ace Hubbard MD      Postoperative Care  Postoperative pain management:  IV analgesics.      Consents  Anesthetic plan, risks, benefits and alternatives discussed with:  Patient.  Use of blood products discussed: Yes.   Consented to blood products.  .                          .

## 2017-04-07 NOTE — MR AVS SNAPSHOT
After Visit Summary   2017    Jim Braden    MRN: [id number]           Patient Information     Date Of Birth          1960        Visit Information        Provider Department      2017 12:30 PM Rn, [facility] Preoperative Assessment Center        Care Instructions    Preparing for Your Surgery      Name:  Jim Braden   MRN:  [id number]   :  1960   Today's Date:  2017     Arriving for surgery:  Surgery date:  17  Surgery time:  7:30 am  Arrival time:  5:30 am    Please come to:   [facility] [address]  [address] [place]  [address]    -   parking is available in front of the hospital from 5:15 am to 8:00 pm    -  Stop at the Information Desk in the lobby    -   Inform the information person that you are here for surgery. An escort to 3C will be provided. If you would not like an escort, please proceed to 3C on the 3rd floor. [phone]   -  Bring your ID and insurance card.    What can I eat or drink?  -  You may have solid food or milk products until 8 hours prior to your surgery. (11:30 pm 17)  -  You may have water, apple juice, BLACK coffee (NO creamer or nondairy creamer), or 7up/Sprite until 2 hours prior to your surgery. (5:30 am)    Which medicines can I take?        -  Last Enoxaparin dose prior to surgery is on 17 the AM dose. Take prior to 7:30 am.         (Hold Enoxaparin 17 PM dose and HOLD the day of surgery.)  -  Do not bring your own medications to the hospital.    -  Please take these medications the day of surgery:  Omeprazole      Acetaminophen (Tylenol) if needed    How do I prepare myself?  -  Take two showers: one the night before surgery; and one the morning of surgery.         Use Scrubcare or Hibiclens to wash from neck down.  You may use your own shampoo and conditioner. No other hair products.   -  Do NOT use lotion, powder, deodorant, or antiperspirant the day of your  surgery.  -  Do NOT wear any jewelry.    -  Begin using Incentive Spirometer 1 week prior to surgery.  Use 4 times per day, up to 5-10 breaths each time.  Bring Incentive Spirometer to hospital.    Questions or Concerns:  If you have questions or concerns, please call the  Preoperative Assessment Center, Monday-Friday 7AM-7PM:  [phone]    AFTER YOUR SURGERY  Breathing exercises   Breathing exercises help you recover faster. Take deep breaths and let the air out slowly. This will:     Help you wake up after surgery.    Help prevent complications like pneumonia.  Preventing complications will help you go home sooner.   Nausea and vomiting   You may feel sick to your stomach after surgery; if so, let your nurse know.    Pain control:  After surgery, you may have pain. Our goal is to help you manage your pain. Pain medicine will help you feel comfortable enough to do activities that will help you heal.  These activities may include breathing exercises, walking and physical therapy.   To help your health care team treat your pain we will ask: 1) If you have pain  2) where it is located 3) describe your pain in your words  Methods of pain control include medications given by mouth, vein or by nerve block for some surgeries.  We may give you a pain control pump that will:  1) Deliver the medicine through a tube placed in your vein  2) Control the amount of medicine you receive  3) Allow you to push a button to deliver a dose of pain medicine  Sequential Compression Device (SCD) or Pneumo Boots:  You may need to wear SCD S on your legs or feet. These are wraps connected to a machine that pumps in air and releases it. The repeated pumping helps prevent blood clots from forming.                 Follow-ups after your visit        Your next 10 appointments already scheduled     Apr 07, 2017 12:30 PM CDT   (Arrive by 12:15 PM)   PAC RN ASSESSMENT with Rajat Pac Rn   [facility] Preoperative Assessment Center ([facility])    [address]  [address] [facility] [id number] [phone]            Apr 07, 2017  1:10 PM CDT   (Arrive by 12:55 PM)   PAC Anesthesia Consult with  Pac Anesthesiologist   [facility] Preoperative Assessment Center ([facility])    [address] [place] [address]  [address] [facility] [id number] [phone]            Apr 07, 2017  1:30 PM CDT   LAB with  LAB   [facility] Lab ([facility])    [address] [place] [address]  [address] [facility] [id number] [phone]           Patient must bring picture ID.  Patient should be prepared to give a urine specimen  Please do not eat 10-12 hours before your appointment if you are coming in fasting for labs on lipids, cholesterol, or glucose (sugar).  Pregnant women should follow their Care Team instructions. Water with medications is okay. Do not drink coffee or other fluids.   If you have concerns about taking  your medications, please ask at office or if scheduling via [employer], send a message by clicking on Secure Messaging, Message Your Care Team.            Apr 14, 2017   Procedure with Jeronimo Mendez MD   [facility], [place], Same Day Surgery (--)    [address] [facility] [id number] [phone]              Who to contact     Please call your clinic at [phone] to:    Ask questions about your health    Make or cancel appointments    Discuss your medicines    Learn about your test results    Speak to your doctor   If you have compliments or concerns about an experience at your clinic, or if you wish to file a complaint, please contact [facility] Patient Relations at [phone] or email us at [email or web address]         Additional Information About Your Visit        [employer] Information     [employer] is an electronic gateway that provides easy, online access to your medical records. With [employer], you can request a clinic appointment, read  your test results, renew a prescription or communicate with your care team.     To sign up for Jobvitehart visit the website at [email or web address]   You will be asked to enter the access code listed below, as well as some personal information. Please follow the directions to create your username and password.     Your access code is: [email or web address]  Expires: 4/10/2017  1:20 PM     Your access code will  in 90 days. If you need help or a new code, please contact your [facility] or call [phone] for assistance.        Care EveryWhere ID     This is your Care EveryWhere ID. This could be used by other organizations to access your [place] medical records  [id number]         Blood Pressure from Last 3 Encounters:   17 (!) 165/94   03/15/17 161/86   17 145/85    Weight from Last 3 Encounters:   17 83.9 kg (185 lb)   03/15/17 81.9 kg (180 lb 8 oz)   17 80.4 kg (177 lb 4.8 oz)              Today, you had the following     No orders found for display         Today's Medication Changes          These changes are accurate as of: 17 11:57 AM.  If you have any questions, ask your nurse or doctor.               Stop taking these medicines if you haven't already. Please contact your care team if you have questions.     dexamethasone 4 MG tablet   Commonly known as:  DECADRON   Stopped by:  Pharmacist,  Pac           LORazepam 0.5 MG tablet   Commonly known as:  ATIVAN   Stopped by:  Pharmacist,  Pac           ondansetron 8 MG tablet   Commonly known as:  ZOFRAN   Stopped by:  Pharmacist,  Pac           prochlorperazine 10 MG tablet   Commonly known as:  COMPAZINE   Stopped by:  Pharmacist,  Pac                    Primary Care Provider Office Phone # Fax #    Edith Dan [phone]       [address] [address]        Thank you!     Thank you for choosing [facility] PREOPERATIVE ASSESSMENT  CENTER  for your care. Our goal is always to provide you with excellent care. Hearing back from our patients is one way we can continue to improve our services. Please take a few minutes to complete the written survey that you may receive in the mail after your visit with us. Thank you!             Your Updated Medication List - Protect others around you: Learn how to safely use, store and throw away your medicines at www.disposemymeds.org.          This list is accurate as of: 4/7/17 11:57 AM.  Always use your most recent med list.                   Brand Name Dispense Instructions for use    ACETAMINOPHEN PO      Take 500-1,000 mg by mouth every 8 hours as needed for pain       enoxaparin 80 MG/0.8ML injection    LOVENOX     Inject 80 mg Subcutaneous 2 times daily       lisinopril 5 MG tablet    PRINIVIL/ZESTRIL     Take 5 mg by mouth every evening Reported on 3/15/2017       omeprazole 20 MG CR capsule    priLOSEC     Take 20 mg by mouth every morning Reported on 3/15/2017

## 2017-04-07 NOTE — MR AVS SNAPSHOT
After Visit Summary   4/7/2017    Jim Braden    MRN: [id number]           Patient Information     Date Of Birth          1960        Visit Information        Provider Department      4/7/2017 11:00 AM Pharmacist, Rajat [facility] [place]        Today's Diagnoses     Preop examination    -  1       Follow-ups after your visit        Your next 10 appointments already scheduled     Apr 07, 2017 11:30 AM CDT   (Arrive by 11:15 AM)   PAC EVALUATION with Rajat Pac Josefina 3   [facility] [place] ([facility])    [address] [place] [address]   [phone]            Apr 07, 2017 12:30 PM CDT   (Arrive by 12:15 PM)   PAC RN ASSESSMENT with Rajat Pac Rn   [facility] [place] ([facility])    [address] [place] [address]   [phone]            Apr 07, 2017  1:10 PM CDT   (Arrive by 12:55 PM)   PAC Anesthesia Consult with Rajat Pac Anesthesiologist   [facility] ([facility])    [address] [place] [address]   [phone]            Apr 07, 2017  1:30 PM CDT   LAB with RAJAT LAB   [facility])    [address] [place] [address]   [phone]           Patient must bring picture ID.  Patient should be prepared to give a urine specimen  Please do not eat 10-12 hours before your appointment if you are coming in fasting for labs on lipids, cholesterol, or glucose (sugar).  Pregnant women should follow their Care Team instructions. Water with medications is okay. Do not drink coffee or other fluids.   If you have concerns about taking  your medications, please ask at office or if scheduling via [employer], send a message by clicking on Secure Messaging, Message Your Care Team.            Apr 14, 2017   Procedure  with Jeronimo Mendez MD   [facility], [place], Same Day Surgery (--)    [address]  [place] [address] [phone]              Who to contact     Please call your clinic at [phone] to:    Ask questions about your health    Make or cancel appointments    Discuss your medicines    Learn about your test results    Speak to your doctor   If you have compliments or concerns about an experience at your clinic, or if you wish to file a complaint, please contact [facility] Patient Relations at [phone] or email us at [email or web address]         Additional Information About Your Visit        Say2meharMoSo Information     [employer] Gamest is an electronic gateway that provides easy, online access to your medical records. With SimpleDeal, you can request a clinic appointment, read your test results, renew a prescription or communicate with your care team.     To sign up for SimpleDeal visit the website at [email or web address]"   You will be asked to enter the access code listed below, as well as some personal information. Please follow the directions to create your username and password.     Your access code is: [email or web address]  Expires: 4/10/2017  1:20 PM     Your access code will  in 90 days. If you need help or a new code, please contact your [facility] or call [phone] for assistance.        Care EveryWhere ID     This is your Care EveryWhere ID. This could be used by other organizations to access your [place] medical records  [id number]         Blood Pressure from Last 3 Encounters:   03/15/17 161/86   17 145/85    Weight from Last 3 Encounters:   03/15/17 81.9 kg (180 lb 8 oz)   17 80.4 kg (177 lb 4.8 oz)              Today, you had the following     No orders found for display         Today's Medication Changes          These changes are accurate as of: 17 11:12 AM.  If you have any questions, ask your nurse or doctor.                Stop taking these medicines if you haven't already. Please contact your care team if you have questions.     dexamethasone 4 MG tablet   Commonly known as:  DECADRON   Stopped by:  Rajat Camacho           LORazepam 0.5 MG tablet   Commonly known as:  ATIVAN   Stopped by:  Pharmacist, Uc Pac           ondansetron 8 MG tablet   Commonly known as:  ZOFRAN   Stopped by:  Pharmacist, Uc Pac           prochlorperazine 10 MG tablet   Commonly known as:  COMPAZINE   Stopped by:  Rajat Camacho                    Primary Care Provider Office Phone # Fax #    Edith Dan [phone]       [address] [address]        Thank you!     Thank you for choosing [facility] PREOPERATIVE ASSESSMENT [place]  for your care. Our goal is always to provide you with excellent care. Hearing back from our patients is one way we can continue to improve our services. Please take a few minutes to complete the written survey that you may receive in the mail after your visit with us. Thank you!             Your Updated Medication List - Protect others around you: Learn how to safely use, store and throw away your medicines at www.disposemymeds.org.          This list is accurate as of: 4/7/17 11:12 AM.  Always use your most recent med list.                   Brand Name Dispense Instructions for use    ACETAMINOPHEN PO      Take 500-1,000 mg by mouth every 8 hours as needed for pain       enoxaparin 80 MG/0.8ML injection    LOVENOX     Inject 80 mg Subcutaneous 2 times daily       lisinopril 5 MG tablet    PRINIVIL/ZESTRIL     Take 5 mg by mouth every evening Reported on 3/15/2017       omeprazole 20 MG CR capsule    priLOSEC     Take 20 mg by mouth every morning Reported on 3/15/2017

## 2017-04-07 NOTE — H&P
Pre-Operative H & P     Date of Encounter: 4/7/2017  Primary Care Physician:  Edith Dan    CC: Distal esophageal adenocarcinoma.      HPI:  Jim Braden is a 56 year old male who presents for pre-operative H & P in preparation for Thoracoscopic Laparoscopic Esophagectomy, Laparoscopic Jejunostomy Tube, Upper Endoscopy, Possible Open on 4/14/17 with Dr. Jeronimo Mendez at [facility].     The patient was first evaluated by Dr. Mendez on 1/11/17 in regards to distal esophageal adenocarcinoma. At that time, tentative arrangements were made for surgery, after the patient had completed neoadjuvant therapy. The patient was also instructed to abstain from alcohol for at least 3 weeks prior to surgery. He was reevaluated on 3/15, and had completed his Folfox chemotherapy on 2/15. Arrangements are now being made for the above procedures.  History is obtained from the patient and the medical record.    Past Medical History:  Past Medical History:   Diagnosis Date     Alpha-1-antitrypsin deficiency carrier (H)      Esophageal cancer (H)      GERD (gastroesophageal reflux disease)      HTN (hypertension)      Leg DVT (deep venous thromboembolism), acute, right (H)      Past Surgical History:  Past Surgical History:   Procedure Laterality Date     APPENDECTOMY       ORTHOPEDIC SURGERY      Left arm after fracture     Hx of Blood transfusions/reactions: Denies.     Hx of abnormal bleeding or anti-platelet use: Patient instructed to hold Lovenox in preparation for surgery.    Menstrual history: No LMP for male patient.    Steroid use in the last year: Denies.    Personal or FH of difficulty with anesthesia: Denies.    Prior to admission medications  Current Outpatient Prescriptions   Medication Sig Dispense Refill     ACETAMINOPHEN PO Take 500-1,000 mg by mouth every 8 hours as needed for pain       enoxaparin (LOVENOX) 80 MG/0.8ML injection Inject 80 mg Subcutaneous 2 times daily        omeprazole (PRILOSEC) 20 MG CR capsule Take 20 mg by mouth every morning Reported on 3/15/2017       lisinopril (PRINIVIL/ZESTRIL) 5 MG tablet Take 5 mg by mouth every evening Reported on 3/15/2017       Allergies  No Known Allergies    Social History  Social History     Social History     Marital status:      Spouse name: N/A     Number of children: N/A     Years of education: N/A     Occupational History     Not on file.     Social History Main Topics     Smoking status: Former Smoker     Packs/day: 1.00     Types: Cigarettes     Smokeless tobacco: Not on file      Comment: Quit 2007     Alcohol use 0.6 - 1.2 oz/week     0 Standard drinks or equivalent, 1 - 2 Cans of beer per week      Comment: 1-2 cans of beer day     Drug use: No     Sexual activity: Not on file     Other Topics Concern     Not on file     Social History Narrative     Family History  Family History   Problem Relation Age of Onset     Other - See Comments Mother      Alpha-1 Antitrypsin Deficiency     Dementia Father      Review of Systems  Functional status: Independent in ADL's.  >4 METS.     The complete review of systems is negative other than noted in the HPI or here.   Constitutional: Denies recent changes in sleeping patterns, or fevers/chills.  Reports that he is regaining the weight he lost while he was being worked up for esophageal cancer.    Eyes: Glasses for vision correction.  No recent vision changes.  EENT: Denies recent changes in hearing, mouth pain, or difficulty swallowing.  Cardiovascular: Denies chest pain, JUNIOR or orthopnea, or palpitations.  Respiratory: Denies shortness of breath or significant cough.    GI: Denies nausea/vomiting or diarrhea/constipation.    : Denies dysuria.    Musculoskeletal: Denies joint pain or swelling.    Skin: Denies rashes or wounds.    Hematologic: Denies easy bruising or bleeding.    Neurologic: Denies migraines, seizures, dizziness, numbness/tingling.  Psychiatric: Denies changes  "in mood or affect.      BP (!) 165/94  Pulse 79  Temp 97.9  F (36.6  C) (Oral)  Resp 16  Ht 1.803 m (5' 11\")  Wt 83.9 kg (185 lb)  SpO2 97%  BMI 25.8 kg/m2    185 lbs 0 oz  5' 11\"   Body mass index is 25.8 kg/(m^2).    Physical Exam  Constitutional: Patient awake, seated upright in a chair, in no apparent distress.  Appears stated age.  Eyes: Pupils equal, round and reactive to light.  Extra ocular muscles intact. Sclera clear.  Conjunctiva normal.  HENT: Head normocephalic.  Oral pharynx intact with moist mucous membranes.  Dentition with a lower partial.  No thyromegaly appreciated.   Respiratory: Port in place over the right anterior chest wall.  Lung sounds clear to auscultation bilaterally.  No rales, rhonchi, or wheezing noted.    Cardiovascular: S1, S2, regular rate and rhythm.  No murmurs, rubs, or gallops noted. No carotid bruits auscultated.  Radial and pedal pulses palpable, bilaterally.  No edema noted.   GI: Bowel sounds present.  Abdomen rounded, soft, non-tender to light palpation.  No hepatosplenomegaly or masses palpated.   Genitourinary: Exam deferred.  Lymph/Hematologic: No cervical or supraclavicular lymphadenopathy noted.  No excessive bruising noted.    Skin: Color appropriate for race, warm, dry.  No rashes or wounds at anticipated surgical site.   Musculoskeletal: Full extension of the neck.  No redness, warmth, or swelling of the joints noted. Gross motor strength is normal.    Neurologic: Alert, oriented to name, place and time. Cranial nerves II-XII are grossly intact. Gait is normal.      Neuropsychiatric: Calm, cooperative. Normal affect.     Labs:  17: WBC 5.8; Hgb 14.1; Hct 41.2; Plt 214; INR 1.01  17: Na 141; K 4.6; Cl 106; Glu 108; BUN 13; Cr 0.89; Ca 9.4    Imagin16 CT Chest/Abdomen/Pelvis:  CONCLUSION:  1. Distal esophageal mass with abnormal local regional lymphadenopathy in the lesser sac and possibly the portacaval chains. ?    16 PET:  INDICATION: " Esophageal cancer staging, initial treatment strategy.?  TECHNIQUE: Serum glucose level 86 mg/dL. One hour post right antecubital intravenous administration of 11.1 mCi F-18 FDG, PET imaging was performed from the skull base to the mid thighs utilizing attenuation correction with?  concurrent axial CT and PET/CT image fusion. Dose reduction techniques were used.  COMPARISON: CT from 12/12/2016 is reviewed.?  FINDINGS:?  HEAD AND NECK: Negative.?  CHEST: Negative.?  ABDOMEN/PELVIS: Markedly FDG avid (SUVmax 19.5) circumferential wall thickening at the gastroesophageal junction spans about 7 cm craniocaudally. At least 4 FDG avid gastrohepatic ligament lymph nodes, the?  largest of which measures 2.4 x 2.1 cm (SUVmax 8.8). 1.5 x 0.8 cm (SUVmax 3.1) lymph node posterior to the gastric fundus. Splenule. Trace calcified atherosclerosis. Pelvic phleboliths.?  MUSCULOSKELETAL: Mild inflammatory FDG activity about the trochanteric bursae. Minimal degenerative changes in the spine.?  CONCLUSION:?  Findings consistent with cancer at the esophagogastric junction with at least 5 lymph node metastases in the upper abdomen above the level of the celiac axis    4/17/17 PFT:    FVC-Pred   Date Value Ref Range Status   04/07/2017 4.59 L      FVC-Pre   Date Value Ref Range Status   04/07/2017 4.54 L      FVC-%Pred-Pre   Date Value Ref Range Status   04/07/2017 99 %      FEV1-Pre   Date Value Ref Range Status   04/07/2017 3.26 L      FEV1-%Pred-Pre   Date Value Ref Range Status   04/07/2017 90 %      FEV1FVC-Pred   Date Value Ref Range Status   04/07/2017 79 %      FEV1FVC-Pre   Date Value Ref Range Status   04/07/2017 72 %      No results found for: 20029  FEFMax-Pred   Date Value Ref Range Status   04/07/2017 9.77 L/sec      FEFMax-Pre   Date Value Ref Range Status   04/07/2017 10.45 L/sec      FEFMax-%Pred-Pre   Date Value Ref Range Status   04/07/2017 106 %      ExpTime-Pre   Date Value Ref Range Status   04/07/2017 7.65 sec       FIFMax-Pre   Date Value Ref Range Status   04/07/2017 5.34 L/sec      FEV1FEV6-Pred   Date Value Ref Range Status   04/07/2017 80 %      FEV1FEV6-Pre   Date Value Ref Range Status   04/07/2017 72 %        4/7/17 EKG: Personally reviewed and interpreted as sinus bradycardia.  Formal cardiology read pending.      Lab results, EKG were personally reviewed by this provider.        Assessment and Plan  Jim Braden is a 56 year old male scheduled to undergo Thoracoscopic Laparoscopic Esophagectomy, Laparoscopic Jejunostomy Tube, Upper Endoscopy, Possible Open on 4/14/17 with Dr. Jeronimo Mendez.    He has the following specific operative considerations:   1.  GERD: Patient instructed to take Prilosec as prescribed.  Consider use of RSI techniques with advanced airway maneuvers.  2.  History of DVT, anticoagulated with Lovenox: Patient instructed to hold Lovenox in preparation for surgery.  Recommend that this be resumed as soon as possible after surgery.  3.  HTN: Patient instructed to hold Lisinopril the AM of surgery for renal protection.    4.  EKG, type & screen, CBC, BMP today.    Revised Cardiac Risk Index: 0.9% risk of major adverse cardiac event.  Anesthesia considerations: Refer to PAC assessment in the anesthesia records.  VTE risk: 4.5%  ROSA risk: Intermediate  PONV risk score= 1.  (If > 2, anti-emetic intervention is recommended.)    Patient was discussed with Dr. Madrid.    Kayli Urbina NP  Preoperative Assessment Center  [facility]  Phone: [phone]  Fax: [phone]

## 2017-04-14 ENCOUNTER — HOSPITAL ENCOUNTER (INPATIENT)
Facility: CLINIC | Age: 57
LOS: 17 days | Discharge: HOME HEALTH | DRG: 357 | End: 2017-05-01
Attending: THORACIC SURGERY (CARDIOTHORACIC VASCULAR SURGERY) | Admitting: THORACIC SURGERY (CARDIOTHORACIC VASCULAR SURGERY)
Payer: COMMERCIAL

## 2017-04-14 ENCOUNTER — APPOINTMENT (OUTPATIENT)
Dept: GENERAL RADIOLOGY | Facility: CLINIC | Age: 57
DRG: 357 | End: 2017-04-14
Attending: THORACIC SURGERY (CARDIOTHORACIC VASCULAR SURGERY)
Payer: COMMERCIAL

## 2017-04-14 ENCOUNTER — ANESTHESIA (OUTPATIENT)
Dept: SURGERY | Facility: CLINIC | Age: 57
DRG: 357 | End: 2017-04-14
Payer: COMMERCIAL

## 2017-04-14 DIAGNOSIS — Z98.890 HISTORY OF ESOPHAGECTOMY: Primary | ICD-10-CM

## 2017-04-14 DIAGNOSIS — Z86.718 HISTORY OF DEEP VENOUS THROMBOSIS: ICD-10-CM

## 2017-04-14 DIAGNOSIS — Z90.49 HISTORY OF ESOPHAGECTOMY: Primary | ICD-10-CM

## 2017-04-14 LAB
ABO + RH BLD: NORMAL
ABO + RH BLD: NORMAL
ALBUMIN SERPL-MCNC: 3.8 G/DL (ref 3.4–5)
ALP SERPL-CCNC: 60 U/L (ref 40–150)
ALT SERPL W P-5'-P-CCNC: 218 U/L (ref 0–70)
ANION GAP SERPL CALCULATED.3IONS-SCNC: 12 MMOL/L (ref 3–14)
AST SERPL W P-5'-P-CCNC: 148 U/L (ref 0–45)
BASE DEFICIT BLDA-SCNC: 2.5 MMOL/L
BASE DEFICIT BLDA-SCNC: 5.5 MMOL/L
BASE DEFICIT BLDA-SCNC: 5.6 MMOL/L
BASE DEFICIT BLDA-SCNC: 6.1 MMOL/L
BASE DEFICIT BLDA-SCNC: 6.9 MMOL/L
BASE DEFICIT BLDA-SCNC: 7.2 MMOL/L
BASE DEFICIT BLDA-SCNC: 7.3 MMOL/L
BILIRUB SERPL-MCNC: 0.9 MG/DL (ref 0.2–1.3)
BLD GP AB SCN SERPL QL: NORMAL
BLD PROD TYP BPU: NORMAL
BLOOD BANK CMNT PATIENT-IMP: NORMAL
BUN SERPL-MCNC: 21 MG/DL (ref 7–30)
CA-I BLD-MCNC: 4.5 MG/DL (ref 4.4–5.2)
CA-I BLD-MCNC: 4.6 MG/DL (ref 4.4–5.2)
CA-I BLD-MCNC: 4.8 MG/DL (ref 4.4–5.2)
CA-I BLD-MCNC: 5 MG/DL (ref 4.4–5.2)
CALCIUM SERPL-MCNC: 7.9 MG/DL (ref 8.5–10.1)
CHLORIDE SERPL-SCNC: 111 MMOL/L (ref 94–109)
CO2 SERPL-SCNC: 19 MMOL/L (ref 20–32)
CREAT SERPL-MCNC: 1.07 MG/DL (ref 0.66–1.25)
ERYTHROCYTE [DISTWIDTH] IN BLOOD BY AUTOMATED COUNT: 14.6 % (ref 10–15)
GFR SERPL CREATININE-BSD FRML MDRD: 72 ML/MIN/1.7M2
GLUCOSE BLD-MCNC: 127 MG/DL (ref 70–99)
GLUCOSE BLD-MCNC: 138 MG/DL (ref 70–99)
GLUCOSE BLD-MCNC: 139 MG/DL (ref 70–99)
GLUCOSE BLD-MCNC: 142 MG/DL (ref 70–99)
GLUCOSE BLD-MCNC: 155 MG/DL (ref 70–99)
GLUCOSE BLD-MCNC: 206 MG/DL (ref 70–99)
GLUCOSE SERPL-MCNC: 150 MG/DL (ref 70–99)
HCO3 BLD-SCNC: 19 MMOL/L (ref 21–28)
HCO3 BLD-SCNC: 19 MMOL/L (ref 21–28)
HCO3 BLD-SCNC: 20 MMOL/L (ref 21–28)
HCO3 BLD-SCNC: 20 MMOL/L (ref 21–28)
HCO3 BLD-SCNC: 21 MMOL/L (ref 21–28)
HCO3 BLD-SCNC: 22 MMOL/L (ref 21–28)
HCO3 BLD-SCNC: 24 MMOL/L (ref 21–28)
HCT VFR BLD AUTO: 36.9 % (ref 40–53)
HGB BLD-MCNC: 11.9 G/DL (ref 13.3–17.7)
HGB BLD-MCNC: 12.1 G/DL (ref 13.3–17.7)
HGB BLD-MCNC: 12.2 G/DL (ref 13.3–17.7)
HGB BLD-MCNC: 12.3 G/DL (ref 13.3–17.7)
HGB BLD-MCNC: 12.4 G/DL (ref 13.3–17.7)
HGB BLD-MCNC: 12.7 G/DL (ref 13.3–17.7)
HGB BLD-MCNC: 13.1 G/DL (ref 13.3–17.7)
INR PPP: 1.1 (ref 0.86–1.14)
MAGNESIUM SERPL-MCNC: 1.9 MG/DL (ref 1.6–2.3)
MCH RBC QN AUTO: 31.7 PG (ref 26.5–33)
MCHC RBC AUTO-ENTMCNC: 32.8 G/DL (ref 31.5–36.5)
MCV RBC AUTO: 97 FL (ref 78–100)
MRSA DNA SPEC QL NAA+PROBE: NORMAL
NUM BPU REQUESTED: 2
O2/TOTAL GAS SETTING VFR VENT: 44 %
O2/TOTAL GAS SETTING VFR VENT: 56 %
O2/TOTAL GAS SETTING VFR VENT: 60 %
O2/TOTAL GAS SETTING VFR VENT: 94 %
O2/TOTAL GAS SETTING VFR VENT: ABNORMAL %
PCO2 BLD: 41 MM HG (ref 35–45)
PCO2 BLD: 41 MM HG (ref 35–45)
PCO2 BLD: 42 MM HG (ref 35–45)
PCO2 BLD: 43 MM HG (ref 35–45)
PCO2 BLD: 45 MM HG (ref 35–45)
PCO2 BLD: 46 MM HG (ref 35–45)
PCO2 BLD: 58 MM HG (ref 35–45)
PH BLD: 7.19 PH (ref 7.35–7.45)
PH BLD: 7.24 PH (ref 7.35–7.45)
PH BLD: 7.28 PH (ref 7.35–7.45)
PH BLD: 7.28 PH (ref 7.35–7.45)
PH BLD: 7.29 PH (ref 7.35–7.45)
PH BLD: 7.3 PH (ref 7.35–7.45)
PH BLD: 7.32 PH (ref 7.35–7.45)
PLATELET # BLD AUTO: 144 10E9/L (ref 150–450)
PLATELET # BLD AUTO: 168 10E9/L (ref 150–450)
PO2 BLD: 115 MM HG (ref 80–105)
PO2 BLD: 126 MM HG (ref 80–105)
PO2 BLD: 142 MM HG (ref 80–105)
PO2 BLD: 146 MM HG (ref 80–105)
PO2 BLD: 149 MM HG (ref 80–105)
PO2 BLD: 85 MM HG (ref 80–105)
PO2 BLD: 98 MM HG (ref 80–105)
POTASSIUM BLD-SCNC: 4.2 MMOL/L (ref 3.4–5.3)
POTASSIUM BLD-SCNC: 4.7 MMOL/L (ref 3.4–5.3)
POTASSIUM BLD-SCNC: 4.7 MMOL/L (ref 3.4–5.3)
POTASSIUM BLD-SCNC: 5 MMOL/L (ref 3.4–5.3)
POTASSIUM BLD-SCNC: 5.2 MMOL/L (ref 3.4–5.3)
POTASSIUM BLD-SCNC: 5.3 MMOL/L (ref 3.4–5.3)
POTASSIUM SERPL-SCNC: 4.8 MMOL/L (ref 3.4–5.3)
PROT SERPL-MCNC: 6.5 G/DL (ref 6.8–8.8)
RBC # BLD AUTO: 3.82 10E12/L (ref 4.4–5.9)
SODIUM BLD-SCNC: 139 MMOL/L (ref 133–144)
SODIUM BLD-SCNC: 139 MMOL/L (ref 133–144)
SODIUM BLD-SCNC: 140 MMOL/L (ref 133–144)
SODIUM BLD-SCNC: 141 MMOL/L (ref 133–144)
SODIUM BLD-SCNC: 141 MMOL/L (ref 133–144)
SODIUM BLD-SCNC: 142 MMOL/L (ref 133–144)
SODIUM SERPL-SCNC: 142 MMOL/L (ref 133–144)
SPECIMEN EXP DATE BLD: NORMAL
SPECIMEN SOURCE: NORMAL
WBC # BLD AUTO: 8.5 10E9/L (ref 4–11)

## 2017-04-14 PROCEDURE — 25800025 ZZH RX 258: Performed by: NURSE ANESTHETIST, CERTIFIED REGISTERED

## 2017-04-14 PROCEDURE — 25000125 ZZHC RX 250: Performed by: NURSE ANESTHETIST, CERTIFIED REGISTERED

## 2017-04-14 PROCEDURE — 85049 AUTOMATED PLATELET COUNT: CPT | Performed by: INTERNAL MEDICINE

## 2017-04-14 PROCEDURE — C9290 INJ, BUPIVACAINE LIPOSOME: HCPCS | Performed by: THORACIC SURGERY (CARDIOTHORACIC VASCULAR SURGERY)

## 2017-04-14 PROCEDURE — 0BJ08ZZ INSPECTION OF TRACHEOBRONCHIAL TREE, VIA NATURAL OR ARTIFICIAL OPENING ENDOSCOPIC: ICD-10-PCS | Performed by: THORACIC SURGERY (CARDIOTHORACIC VASCULAR SURGERY)

## 2017-04-14 PROCEDURE — 36000070 ZZH SURGERY LEVEL 5 EA 15 ADDTL MIN - UMMC: Performed by: THORACIC SURGERY (CARDIOTHORACIC VASCULAR SURGERY)

## 2017-04-14 PROCEDURE — 25000132 ZZH RX MED GY IP 250 OP 250 PS 637: Performed by: NURSE ANESTHETIST, CERTIFIED REGISTERED

## 2017-04-14 PROCEDURE — 25000125 ZZHC RX 250: Performed by: SURGERY

## 2017-04-14 PROCEDURE — 25800025 ZZH RX 258: Performed by: SURGERY

## 2017-04-14 PROCEDURE — 88331 PATH CONSLTJ SURG 1 BLK 1SPC: CPT | Performed by: THORACIC SURGERY (CARDIOTHORACIC VASCULAR SURGERY)

## 2017-04-14 PROCEDURE — 82803 BLOOD GASES ANY COMBINATION: CPT | Performed by: ANESTHESIOLOGY

## 2017-04-14 PROCEDURE — 37000009 ZZH ANESTHESIA TECHNICAL FEE, EACH ADDTL 15 MIN: Performed by: THORACIC SURGERY (CARDIOTHORACIC VASCULAR SURGERY)

## 2017-04-14 PROCEDURE — 0W9B40Z DRAINAGE OF LEFT PLEURAL CAVITY WITH DRAINAGE DEVICE, PERCUTANEOUS ENDOSCOPIC APPROACH: ICD-10-PCS | Performed by: THORACIC SURGERY (CARDIOTHORACIC VASCULAR SURGERY)

## 2017-04-14 PROCEDURE — 25000132 ZZH RX MED GY IP 250 OP 250 PS 637: Performed by: SURGERY

## 2017-04-14 PROCEDURE — 25000125 ZZHC RX 250: Performed by: ANESTHESIOLOGY

## 2017-04-14 PROCEDURE — 84295 ASSAY OF SERUM SODIUM: CPT | Performed by: ANESTHESIOLOGY

## 2017-04-14 PROCEDURE — 99291 CRITICAL CARE FIRST HOUR: CPT | Mod: GC | Performed by: INTERNAL MEDICINE

## 2017-04-14 PROCEDURE — C1769 GUIDE WIRE: HCPCS | Performed by: THORACIC SURGERY (CARDIOTHORACIC VASCULAR SURGERY)

## 2017-04-14 PROCEDURE — 20000004 ZZH R&B ICU UMMC

## 2017-04-14 PROCEDURE — 0DJ68ZZ INSPECTION OF STOMACH, VIA NATURAL OR ARTIFICIAL OPENING ENDOSCOPIC: ICD-10-PCS | Performed by: THORACIC SURGERY (CARDIOTHORACIC VASCULAR SURGERY)

## 2017-04-14 PROCEDURE — 25800025 ZZH RX 258: Performed by: ANESTHESIOLOGY

## 2017-04-14 PROCEDURE — 94640 AIRWAY INHALATION TREATMENT: CPT

## 2017-04-14 PROCEDURE — 25000128 H RX IP 250 OP 636: Performed by: SURGERY

## 2017-04-14 PROCEDURE — 82947 ASSAY GLUCOSE BLOOD QUANT: CPT | Performed by: THORACIC SURGERY (CARDIOTHORACIC VASCULAR SURGERY)

## 2017-04-14 PROCEDURE — 88377 M/PHMTRC ALYS ISHQUANT/SEMIQ: CPT | Performed by: THORACIC SURGERY (CARDIOTHORACIC VASCULAR SURGERY)

## 2017-04-14 PROCEDURE — 40000275 ZZH STATISTIC RCP TIME EA 10 MIN

## 2017-04-14 PROCEDURE — C9399 UNCLASSIFIED DRUGS OR BIOLOG: HCPCS | Performed by: NURSE ANESTHETIST, CERTIFIED REGISTERED

## 2017-04-14 PROCEDURE — 00000158 ZZHCL STATISTIC H-FISH PROCESS B/S: Performed by: THORACIC SURGERY (CARDIOTHORACIC VASCULAR SURGERY)

## 2017-04-14 PROCEDURE — 88307 TISSUE EXAM BY PATHOLOGIST: CPT | Performed by: THORACIC SURGERY (CARDIOTHORACIC VASCULAR SURGERY)

## 2017-04-14 PROCEDURE — 84132 ASSAY OF SERUM POTASSIUM: CPT | Performed by: THORACIC SURGERY (CARDIOTHORACIC VASCULAR SURGERY)

## 2017-04-14 PROCEDURE — 88309 TISSUE EXAM BY PATHOLOGIST: CPT | Performed by: THORACIC SURGERY (CARDIOTHORACIC VASCULAR SURGERY)

## 2017-04-14 PROCEDURE — 86900 BLOOD TYPING SEROLOGIC ABO: CPT | Performed by: THORACIC SURGERY (CARDIOTHORACIC VASCULAR SURGERY)

## 2017-04-14 PROCEDURE — 71000016 ZZH RECOVERY PHASE 1 LEVEL 3 FIRST HR: Performed by: THORACIC SURGERY (CARDIOTHORACIC VASCULAR SURGERY)

## 2017-04-14 PROCEDURE — 25000576 ZZH RX IP 250 OP 636 J0585: Performed by: CLINICAL NURSE SPECIALIST

## 2017-04-14 PROCEDURE — C1894 INTRO/SHEATH, NON-LASER: HCPCS | Performed by: THORACIC SURGERY (CARDIOTHORACIC VASCULAR SURGERY)

## 2017-04-14 PROCEDURE — P9041 ALBUMIN (HUMAN),5%, 50ML: HCPCS | Performed by: NURSE ANESTHETIST, CERTIFIED REGISTERED

## 2017-04-14 PROCEDURE — 0DB64ZZ EXCISION OF STOMACH, PERCUTANEOUS ENDOSCOPIC APPROACH: ICD-10-PCS | Performed by: THORACIC SURGERY (CARDIOTHORACIC VASCULAR SURGERY)

## 2017-04-14 PROCEDURE — 40000170 ZZH STATISTIC PRE-PROCEDURE ASSESSMENT II: Performed by: THORACIC SURGERY (CARDIOTHORACIC VASCULAR SURGERY)

## 2017-04-14 PROCEDURE — 25000128 H RX IP 250 OP 636: Performed by: NURSE ANESTHETIST, CERTIFIED REGISTERED

## 2017-04-14 PROCEDURE — 86850 RBC ANTIBODY SCREEN: CPT | Performed by: THORACIC SURGERY (CARDIOTHORACIC VASCULAR SURGERY)

## 2017-04-14 PROCEDURE — 82947 ASSAY GLUCOSE BLOOD QUANT: CPT | Performed by: ANESTHESIOLOGY

## 2017-04-14 PROCEDURE — 88305 TISSUE EXAM BY PATHOLOGIST: CPT | Performed by: THORACIC SURGERY (CARDIOTHORACIC VASCULAR SURGERY)

## 2017-04-14 PROCEDURE — 25000128 H RX IP 250 OP 636: Performed by: THORACIC SURGERY (CARDIOTHORACIC VASCULAR SURGERY)

## 2017-04-14 PROCEDURE — 25000128 H RX IP 250 OP 636: Performed by: ANESTHESIOLOGY

## 2017-04-14 PROCEDURE — 40000196 ZZH STATISTIC RAPCV CVP MONITORING

## 2017-04-14 PROCEDURE — 25000128 H RX IP 250 OP 636: Performed by: CLINICAL NURSE SPECIALIST

## 2017-04-14 PROCEDURE — 86901 BLOOD TYPING SEROLOGIC RH(D): CPT | Performed by: THORACIC SURGERY (CARDIOTHORACIC VASCULAR SURGERY)

## 2017-04-14 PROCEDURE — 0DHA4UZ INSERTION OF FEEDING DEVICE INTO JEJUNUM, PERCUTANEOUS ENDOSCOPIC APPROACH: ICD-10-PCS | Performed by: THORACIC SURGERY (CARDIOTHORACIC VASCULAR SURGERY)

## 2017-04-14 PROCEDURE — 86923 COMPATIBILITY TEST ELECTRIC: CPT | Performed by: THORACIC SURGERY (CARDIOTHORACIC VASCULAR SURGERY)

## 2017-04-14 PROCEDURE — 27210794 ZZH OR GENERAL SUPPLY STERILE: Performed by: THORACIC SURGERY (CARDIOTHORACIC VASCULAR SURGERY)

## 2017-04-14 PROCEDURE — 82803 BLOOD GASES ANY COMBINATION: CPT | Performed by: THORACIC SURGERY (CARDIOTHORACIC VASCULAR SURGERY)

## 2017-04-14 PROCEDURE — 00000159 ZZHCL STATISTIC H-SEND OUTS PREP: Performed by: THORACIC SURGERY (CARDIOTHORACIC VASCULAR SURGERY)

## 2017-04-14 PROCEDURE — 84295 ASSAY OF SERUM SODIUM: CPT | Performed by: THORACIC SURGERY (CARDIOTHORACIC VASCULAR SURGERY)

## 2017-04-14 PROCEDURE — 36000068 ZZH SURGERY LEVEL 5 1ST 30 MIN - UMMC: Performed by: THORACIC SURGERY (CARDIOTHORACIC VASCULAR SURGERY)

## 2017-04-14 PROCEDURE — 71000017 ZZH RECOVERY PHASE 1 LEVEL 3 EA ADDTL HR: Performed by: THORACIC SURGERY (CARDIOTHORACIC VASCULAR SURGERY)

## 2017-04-14 PROCEDURE — 87640 STAPH A DNA AMP PROBE: CPT | Performed by: STUDENT IN AN ORGANIZED HEALTH CARE EDUCATION/TRAINING PROGRAM

## 2017-04-14 PROCEDURE — 07T74ZZ RESECTION OF THORAX LYMPHATIC, PERCUTANEOUS ENDOSCOPIC APPROACH: ICD-10-PCS | Performed by: THORACIC SURGERY (CARDIOTHORACIC VASCULAR SURGERY)

## 2017-04-14 PROCEDURE — 0DB34ZZ EXCISION OF LOWER ESOPHAGUS, PERCUTANEOUS ENDOSCOPIC APPROACH: ICD-10-PCS | Performed by: THORACIC SURGERY (CARDIOTHORACIC VASCULAR SURGERY)

## 2017-04-14 PROCEDURE — 82330 ASSAY OF CALCIUM: CPT | Performed by: THORACIC SURGERY (CARDIOTHORACIC VASCULAR SURGERY)

## 2017-04-14 PROCEDURE — 25000565 ZZH ISOFLURANE, EA 15 MIN: Performed by: THORACIC SURGERY (CARDIOTHORACIC VASCULAR SURGERY)

## 2017-04-14 PROCEDURE — 40000014 ZZH STATISTIC ARTERIAL MONITORING DAILY

## 2017-04-14 PROCEDURE — 71010 XR CHEST PORT 1 VW: CPT

## 2017-04-14 PROCEDURE — 82330 ASSAY OF CALCIUM: CPT | Performed by: ANESTHESIOLOGY

## 2017-04-14 PROCEDURE — 87641 MR-STAPH DNA AMP PROBE: CPT | Performed by: STUDENT IN AN ORGANIZED HEALTH CARE EDUCATION/TRAINING PROGRAM

## 2017-04-14 PROCEDURE — 83735 ASSAY OF MAGNESIUM: CPT | Performed by: THORACIC SURGERY (CARDIOTHORACIC VASCULAR SURGERY)

## 2017-04-14 PROCEDURE — 37000008 ZZH ANESTHESIA TECHNICAL FEE, 1ST 30 MIN: Performed by: THORACIC SURGERY (CARDIOTHORACIC VASCULAR SURGERY)

## 2017-04-14 PROCEDURE — 85610 PROTHROMBIN TIME: CPT | Performed by: THORACIC SURGERY (CARDIOTHORACIC VASCULAR SURGERY)

## 2017-04-14 PROCEDURE — 27211024 ZZHC OR SUPPLY OTHER OPNP: Performed by: THORACIC SURGERY (CARDIOTHORACIC VASCULAR SURGERY)

## 2017-04-14 PROCEDURE — 84132 ASSAY OF SERUM POTASSIUM: CPT | Performed by: ANESTHESIOLOGY

## 2017-04-14 PROCEDURE — 80053 COMPREHEN METABOLIC PANEL: CPT | Performed by: THORACIC SURGERY (CARDIOTHORACIC VASCULAR SURGERY)

## 2017-04-14 RX ORDER — ONDANSETRON 2 MG/ML
4 INJECTION INTRAMUSCULAR; INTRAVENOUS EVERY 30 MIN PRN
Status: DISCONTINUED | OUTPATIENT
Start: 2017-04-14 | End: 2017-04-14 | Stop reason: HOSPADM

## 2017-04-14 RX ORDER — PROCHLORPERAZINE MALEATE 5 MG
5-10 TABLET ORAL EVERY 6 HOURS PRN
Status: DISCONTINUED | OUTPATIENT
Start: 2017-04-14 | End: 2017-05-01 | Stop reason: HOSPADM

## 2017-04-14 RX ORDER — ONDANSETRON 4 MG/1
4 TABLET, ORALLY DISINTEGRATING ORAL EVERY 6 HOURS PRN
Status: DISCONTINUED | OUTPATIENT
Start: 2017-04-14 | End: 2017-04-14

## 2017-04-14 RX ORDER — ONDANSETRON 4 MG/1
4 TABLET, ORALLY DISINTEGRATING ORAL EVERY 6 HOURS PRN
Status: DISCONTINUED | OUTPATIENT
Start: 2017-04-14 | End: 2017-05-01 | Stop reason: HOSPADM

## 2017-04-14 RX ORDER — FLUCONAZOLE 2 MG/ML
200 INJECTION, SOLUTION INTRAVENOUS
Status: COMPLETED | OUTPATIENT
Start: 2017-04-14 | End: 2017-04-14

## 2017-04-14 RX ORDER — LIDOCAINE 40 MG/G
CREAM TOPICAL
Status: DISCONTINUED | OUTPATIENT
Start: 2017-04-14 | End: 2017-05-01 | Stop reason: HOSPADM

## 2017-04-14 RX ORDER — ALBUMIN, HUMAN INJ 5% 5 %
SOLUTION INTRAVENOUS CONTINUOUS PRN
Status: DISCONTINUED | OUTPATIENT
Start: 2017-04-14 | End: 2017-04-14

## 2017-04-14 RX ORDER — POTASSIUM CHLORIDE 29.8 MG/ML
20 INJECTION INTRAVENOUS
Status: DISCONTINUED | OUTPATIENT
Start: 2017-04-14 | End: 2017-05-01 | Stop reason: HOSPADM

## 2017-04-14 RX ORDER — ACETAMINOPHEN 10 MG/ML
1000 INJECTION, SOLUTION INTRAVENOUS ONCE
Status: COMPLETED | OUTPATIENT
Start: 2017-04-14 | End: 2017-04-14

## 2017-04-14 RX ORDER — PIPERACILLIN SODIUM, TAZOBACTAM SODIUM 3; .375 G/15ML; G/15ML
3.38 INJECTION, POWDER, LYOPHILIZED, FOR SOLUTION INTRAVENOUS EVERY 6 HOURS
Status: COMPLETED | OUTPATIENT
Start: 2017-04-14 | End: 2017-04-15

## 2017-04-14 RX ORDER — PROPOFOL 10 MG/ML
INJECTION, EMULSION INTRAVENOUS PRN
Status: DISCONTINUED | OUTPATIENT
Start: 2017-04-14 | End: 2017-04-14

## 2017-04-14 RX ORDER — NALOXONE HYDROCHLORIDE 0.4 MG/ML
.1-.4 INJECTION, SOLUTION INTRAMUSCULAR; INTRAVENOUS; SUBCUTANEOUS
Status: DISCONTINUED | OUTPATIENT
Start: 2017-04-14 | End: 2017-05-01 | Stop reason: HOSPADM

## 2017-04-14 RX ORDER — SODIUM CHLORIDE, SODIUM LACTATE, POTASSIUM CHLORIDE, CALCIUM CHLORIDE 600; 310; 30; 20 MG/100ML; MG/100ML; MG/100ML; MG/100ML
INJECTION, SOLUTION INTRAVENOUS CONTINUOUS
Status: DISCONTINUED | OUTPATIENT
Start: 2017-04-14 | End: 2017-04-15

## 2017-04-14 RX ORDER — POTASSIUM CHLORIDE 7.45 MG/ML
10 INJECTION INTRAVENOUS
Status: DISCONTINUED | OUTPATIENT
Start: 2017-04-14 | End: 2017-05-01 | Stop reason: HOSPADM

## 2017-04-14 RX ORDER — LABETALOL HYDROCHLORIDE 5 MG/ML
10 INJECTION, SOLUTION INTRAVENOUS
Status: DISCONTINUED | OUTPATIENT
Start: 2017-04-14 | End: 2017-04-14 | Stop reason: HOSPADM

## 2017-04-14 RX ORDER — POTASSIUM CHLORIDE 750 MG/1
20-40 TABLET, EXTENDED RELEASE ORAL
Status: DISCONTINUED | OUTPATIENT
Start: 2017-04-14 | End: 2017-05-01 | Stop reason: HOSPADM

## 2017-04-14 RX ORDER — SODIUM CHLORIDE 9 MG/ML
INJECTION, SOLUTION INTRAVENOUS CONTINUOUS PRN
Status: DISCONTINUED | OUTPATIENT
Start: 2017-04-14 | End: 2017-04-14

## 2017-04-14 RX ORDER — PIPERACILLIN SODIUM, TAZOBACTAM SODIUM 3; .375 G/15ML; G/15ML
3.38 INJECTION, POWDER, LYOPHILIZED, FOR SOLUTION INTRAVENOUS EVERY 6 HOURS
Status: DISCONTINUED | OUTPATIENT
Start: 2017-04-14 | End: 2017-04-14 | Stop reason: HOSPADM

## 2017-04-14 RX ORDER — KETAMINE HYDROCHLORIDE 10 MG/ML
INJECTION, SOLUTION INTRAMUSCULAR; INTRAVENOUS PRN
Status: DISCONTINUED | OUTPATIENT
Start: 2017-04-14 | End: 2017-04-14

## 2017-04-14 RX ORDER — ONDANSETRON 4 MG/1
4 TABLET, ORALLY DISINTEGRATING ORAL EVERY 30 MIN PRN
Status: DISCONTINUED | OUTPATIENT
Start: 2017-04-14 | End: 2017-04-14 | Stop reason: HOSPADM

## 2017-04-14 RX ORDER — NALOXONE HYDROCHLORIDE 0.4 MG/ML
.1-.4 INJECTION, SOLUTION INTRAMUSCULAR; INTRAVENOUS; SUBCUTANEOUS
Status: DISCONTINUED | OUTPATIENT
Start: 2017-04-14 | End: 2017-04-14

## 2017-04-14 RX ORDER — IPRATROPIUM BROMIDE AND ALBUTEROL SULFATE 2.5; .5 MG/3ML; MG/3ML
3 SOLUTION RESPIRATORY (INHALATION) 4 TIMES DAILY
Status: DISCONTINUED | OUTPATIENT
Start: 2017-04-14 | End: 2017-04-17 | Stop reason: CLARIF

## 2017-04-14 RX ORDER — GLYCOPYRROLATE 0.2 MG/ML
INJECTION, SOLUTION INTRAMUSCULAR; INTRAVENOUS PRN
Status: DISCONTINUED | OUTPATIENT
Start: 2017-04-14 | End: 2017-04-14

## 2017-04-14 RX ORDER — LIDOCAINE HYDROCHLORIDE 20 MG/ML
INJECTION, SOLUTION INFILTRATION; PERINEURAL PRN
Status: DISCONTINUED | OUTPATIENT
Start: 2017-04-14 | End: 2017-04-14

## 2017-04-14 RX ORDER — HEPARIN SODIUM 5000 [USP'U]/.5ML
5000 INJECTION, SOLUTION INTRAVENOUS; SUBCUTANEOUS EVERY 8 HOURS
Status: DISCONTINUED | OUTPATIENT
Start: 2017-04-15 | End: 2017-04-15 | Stop reason: ALTCHOICE

## 2017-04-14 RX ORDER — POTASSIUM CHLORIDE 1.5 G/1.58G
20-40 POWDER, FOR SOLUTION ORAL
Status: DISCONTINUED | OUTPATIENT
Start: 2017-04-14 | End: 2017-05-01 | Stop reason: HOSPADM

## 2017-04-14 RX ORDER — HYDROMORPHONE HYDROCHLORIDE 1 MG/ML
.3-.5 INJECTION, SOLUTION INTRAMUSCULAR; INTRAVENOUS; SUBCUTANEOUS EVERY 5 MIN PRN
Status: DISCONTINUED | OUTPATIENT
Start: 2017-04-14 | End: 2017-04-14 | Stop reason: HOSPADM

## 2017-04-14 RX ORDER — FENTANYL CITRATE 50 UG/ML
INJECTION, SOLUTION INTRAMUSCULAR; INTRAVENOUS PRN
Status: DISCONTINUED | OUTPATIENT
Start: 2017-04-14 | End: 2017-04-14

## 2017-04-14 RX ORDER — FENTANYL CITRATE 50 UG/ML
25-50 INJECTION, SOLUTION INTRAMUSCULAR; INTRAVENOUS
Status: DISCONTINUED | OUTPATIENT
Start: 2017-04-14 | End: 2017-04-14 | Stop reason: HOSPADM

## 2017-04-14 RX ORDER — ONDANSETRON 2 MG/ML
4 INJECTION INTRAMUSCULAR; INTRAVENOUS EVERY 6 HOURS PRN
Status: DISCONTINUED | OUTPATIENT
Start: 2017-04-14 | End: 2017-04-14

## 2017-04-14 RX ORDER — ACETAMINOPHEN 325 MG/1
650 TABLET ORAL EVERY 4 HOURS PRN
Status: DISCONTINUED | OUTPATIENT
Start: 2017-04-17 | End: 2017-04-16

## 2017-04-14 RX ORDER — SODIUM CHLORIDE, SODIUM LACTATE, POTASSIUM CHLORIDE, CALCIUM CHLORIDE 600; 310; 30; 20 MG/100ML; MG/100ML; MG/100ML; MG/100ML
INJECTION, SOLUTION INTRAVENOUS CONTINUOUS PRN
Status: DISCONTINUED | OUTPATIENT
Start: 2017-04-14 | End: 2017-04-14

## 2017-04-14 RX ORDER — HYDRALAZINE HYDROCHLORIDE 20 MG/ML
2.5-5 INJECTION INTRAMUSCULAR; INTRAVENOUS EVERY 10 MIN PRN
Status: DISCONTINUED | OUTPATIENT
Start: 2017-04-14 | End: 2017-04-14 | Stop reason: HOSPADM

## 2017-04-14 RX ORDER — ACETAMINOPHEN 325 MG/1
975 TABLET ORAL EVERY 8 HOURS
Status: DISCONTINUED | OUTPATIENT
Start: 2017-04-14 | End: 2017-04-16

## 2017-04-14 RX ORDER — ONDANSETRON 2 MG/ML
4 INJECTION INTRAMUSCULAR; INTRAVENOUS EVERY 6 HOURS PRN
Status: DISCONTINUED | OUTPATIENT
Start: 2017-04-14 | End: 2017-05-01 | Stop reason: HOSPADM

## 2017-04-14 RX ORDER — CHLORHEXIDINE GLUCONATE ORAL RINSE 1.2 MG/ML
15 SOLUTION DENTAL 2 TIMES DAILY
Status: DISCONTINUED | OUTPATIENT
Start: 2017-04-14 | End: 2017-05-01

## 2017-04-14 RX ORDER — POTASSIUM CL/LIDO/0.9 % NACL 10MEQ/0.1L
10 INTRAVENOUS SOLUTION, PIGGYBACK (ML) INTRAVENOUS
Status: DISCONTINUED | OUTPATIENT
Start: 2017-04-14 | End: 2017-05-01 | Stop reason: HOSPADM

## 2017-04-14 RX ORDER — AMOXICILLIN 250 MG
1-2 CAPSULE ORAL 2 TIMES DAILY
Status: DISCONTINUED | OUTPATIENT
Start: 2017-04-14 | End: 2017-04-16

## 2017-04-14 RX ORDER — PANTOPRAZOLE SODIUM 40 MG/1
40 TABLET, DELAYED RELEASE ORAL DAILY
Status: DISCONTINUED | OUTPATIENT
Start: 2017-04-15 | End: 2017-04-15

## 2017-04-14 RX ORDER — SODIUM CHLORIDE, SODIUM LACTATE, POTASSIUM CHLORIDE, CALCIUM CHLORIDE 600; 310; 30; 20 MG/100ML; MG/100ML; MG/100ML; MG/100ML
INJECTION, SOLUTION INTRAVENOUS CONTINUOUS
Status: DISCONTINUED | OUTPATIENT
Start: 2017-04-14 | End: 2017-04-14 | Stop reason: HOSPADM

## 2017-04-14 RX ORDER — MAGNESIUM SULFATE HEPTAHYDRATE 40 MG/ML
4 INJECTION, SOLUTION INTRAVENOUS EVERY 4 HOURS PRN
Status: DISCONTINUED | OUTPATIENT
Start: 2017-04-14 | End: 2017-05-01 | Stop reason: HOSPADM

## 2017-04-14 RX ORDER — ONDANSETRON 2 MG/ML
INJECTION INTRAMUSCULAR; INTRAVENOUS PRN
Status: DISCONTINUED | OUTPATIENT
Start: 2017-04-14 | End: 2017-04-14

## 2017-04-14 RX ADMIN — PHENYLEPHRINE HYDROCHLORIDE 200 MCG: 10 INJECTION, SOLUTION INTRAMUSCULAR; INTRAVENOUS; SUBCUTANEOUS at 14:04

## 2017-04-14 RX ADMIN — PIPERACILLIN SODIUM AND TAZOBACTAM SODIUM 2.25 G: .375; 3 INJECTION, POWDER, LYOPHILIZED, FOR SOLUTION INTRAVENOUS at 14:30

## 2017-04-14 RX ADMIN — ACETAMINOPHEN 1000 MG: 10 INJECTION, SOLUTION INTRAVENOUS at 13:48

## 2017-04-14 RX ADMIN — PHENYLEPHRINE HYDROCHLORIDE 100 MCG: 10 INJECTION, SOLUTION INTRAMUSCULAR; INTRAVENOUS; SUBCUTANEOUS at 14:13

## 2017-04-14 RX ADMIN — FENTANYL CITRATE 100 MCG: 50 INJECTION, SOLUTION INTRAMUSCULAR; INTRAVENOUS at 08:15

## 2017-04-14 RX ADMIN — ACETAMINOPHEN 975 MG: 325 TABLET, FILM COATED ORAL at 21:33

## 2017-04-14 RX ADMIN — PHENYLEPHRINE HYDROCHLORIDE 200 MCG: 10 INJECTION, SOLUTION INTRAMUSCULAR; INTRAVENOUS; SUBCUTANEOUS at 12:27

## 2017-04-14 RX ADMIN — WHITE PETROLATUM MINERAL OIL 1 INCH: 150; 830 OINTMENT OPHTHALMIC at 11:20

## 2017-04-14 RX ADMIN — MIDAZOLAM HYDROCHLORIDE 2 MG: 1 INJECTION, SOLUTION INTRAMUSCULAR; INTRAVENOUS at 07:28

## 2017-04-14 RX ADMIN — CHLORHEXIDINE GLUCONATE 15 ML: 1.2 RINSE ORAL at 21:33

## 2017-04-14 RX ADMIN — ALBUMIN (HUMAN): 12.5 SOLUTION INTRAVENOUS at 12:05

## 2017-04-14 RX ADMIN — FENTANYL CITRATE 25 MCG: 50 INJECTION INTRAMUSCULAR; INTRAVENOUS at 17:31

## 2017-04-14 RX ADMIN — PHENYLEPHRINE HYDROCHLORIDE 200 MCG: 10 INJECTION, SOLUTION INTRAMUSCULAR; INTRAVENOUS; SUBCUTANEOUS at 12:26

## 2017-04-14 RX ADMIN — PHENYLEPHRINE HYDROCHLORIDE 200 MCG: 10 INJECTION, SOLUTION INTRAMUSCULAR; INTRAVENOUS; SUBCUTANEOUS at 11:50

## 2017-04-14 RX ADMIN — ROCURONIUM BROMIDE 10 MG: 10 INJECTION INTRAVENOUS at 13:22

## 2017-04-14 RX ADMIN — PHENYLEPHRINE HYDROCHLORIDE 100 MCG: 10 INJECTION, SOLUTION INTRAMUSCULAR; INTRAVENOUS; SUBCUTANEOUS at 10:38

## 2017-04-14 RX ADMIN — PHENYLEPHRINE HYDROCHLORIDE 200 MCG: 10 INJECTION, SOLUTION INTRAMUSCULAR; INTRAVENOUS; SUBCUTANEOUS at 14:50

## 2017-04-14 RX ADMIN — SENNOSIDES AND DOCUSATE SODIUM 1 TABLET: 8.6; 5 TABLET ORAL at 21:33

## 2017-04-14 RX ADMIN — IPRATROPIUM BROMIDE AND ALBUTEROL SULFATE 3 ML: .5; 3 SOLUTION RESPIRATORY (INHALATION) at 21:16

## 2017-04-14 RX ADMIN — FENTANYL CITRATE 25 MCG: 50 INJECTION INTRAMUSCULAR; INTRAVENOUS at 18:10

## 2017-04-14 RX ADMIN — FENTANYL CITRATE 25 MCG: 50 INJECTION INTRAMUSCULAR; INTRAVENOUS at 18:00

## 2017-04-14 RX ADMIN — FENTANYL CITRATE 25 MCG: 50 INJECTION INTRAMUSCULAR; INTRAVENOUS at 17:34

## 2017-04-14 RX ADMIN — FENTANYL CITRATE 50 MCG: 50 INJECTION, SOLUTION INTRAMUSCULAR; INTRAVENOUS at 16:26

## 2017-04-14 RX ADMIN — PIPERACILLIN SODIUM AND TAZOBACTAM SODIUM 2.25 G: .375; 3 INJECTION, POWDER, LYOPHILIZED, FOR SOLUTION INTRAVENOUS at 12:31

## 2017-04-14 RX ADMIN — ONDANSETRON 4 MG: 2 INJECTION INTRAMUSCULAR; INTRAVENOUS at 16:32

## 2017-04-14 RX ADMIN — PHENYLEPHRINE HYDROCHLORIDE 100 MCG: 10 INJECTION, SOLUTION INTRAMUSCULAR; INTRAVENOUS; SUBCUTANEOUS at 12:24

## 2017-04-14 RX ADMIN — HYDROMORPHONE HYDROCHLORIDE 0.5 MG: 1 INJECTION, SOLUTION INTRAMUSCULAR; INTRAVENOUS; SUBCUTANEOUS at 16:48

## 2017-04-14 RX ADMIN — GLYCOPYRROLATE 0.2 MG: 0.2 INJECTION, SOLUTION INTRAMUSCULAR; INTRAVENOUS at 07:32

## 2017-04-14 RX ADMIN — KETAMINE HYDROCHLORIDE 40 MG: 10 INJECTION, SOLUTION INTRAMUSCULAR; INTRAVENOUS at 07:38

## 2017-04-14 RX ADMIN — PHENYLEPHRINE HYDROCHLORIDE 100 MCG: 10 INJECTION, SOLUTION INTRAMUSCULAR; INTRAVENOUS; SUBCUTANEOUS at 14:11

## 2017-04-14 RX ADMIN — SODIUM CHLORIDE, POTASSIUM CHLORIDE, SODIUM LACTATE AND CALCIUM CHLORIDE: 600; 310; 30; 20 INJECTION, SOLUTION INTRAVENOUS at 14:53

## 2017-04-14 RX ADMIN — HYDROMORPHONE HYDROCHLORIDE 0.5 MG: 1 INJECTION, SOLUTION INTRAMUSCULAR; INTRAVENOUS; SUBCUTANEOUS at 16:41

## 2017-04-14 RX ADMIN — FENTANYL CITRATE 50 MCG: 50 INJECTION, SOLUTION INTRAMUSCULAR; INTRAVENOUS at 14:24

## 2017-04-14 RX ADMIN — PHENYLEPHRINE HYDROCHLORIDE 200 MCG: 10 INJECTION, SOLUTION INTRAMUSCULAR; INTRAVENOUS; SUBCUTANEOUS at 08:35

## 2017-04-14 RX ADMIN — PHENYLEPHRINE HYDROCHLORIDE 100 MCG: 10 INJECTION, SOLUTION INTRAMUSCULAR; INTRAVENOUS; SUBCUTANEOUS at 13:02

## 2017-04-14 RX ADMIN — PHENYLEPHRINE HYDROCHLORIDE 100 MCG: 10 INJECTION, SOLUTION INTRAMUSCULAR; INTRAVENOUS; SUBCUTANEOUS at 11:10

## 2017-04-14 RX ADMIN — ROCURONIUM BROMIDE 20 MG: 10 INJECTION INTRAVENOUS at 10:09

## 2017-04-14 RX ADMIN — SODIUM CHLORIDE, POTASSIUM CHLORIDE, SODIUM LACTATE AND CALCIUM CHLORIDE: 600; 310; 30; 20 INJECTION, SOLUTION INTRAVENOUS at 07:25

## 2017-04-14 RX ADMIN — DEXMEDETOMIDINE 0.2 MCG/KG/HR: 100 INJECTION, SOLUTION, CONCENTRATE INTRAVENOUS at 08:46

## 2017-04-14 RX ADMIN — PHENYLEPHRINE HYDROCHLORIDE 100 MCG: 10 INJECTION, SOLUTION INTRAMUSCULAR; INTRAVENOUS; SUBCUTANEOUS at 14:19

## 2017-04-14 RX ADMIN — ACETAMINOPHEN 1000 MG: 10 INJECTION, SOLUTION INTRAVENOUS at 07:03

## 2017-04-14 RX ADMIN — HYDROMORPHONE HYDROCHLORIDE 0.5 MG: 10 INJECTION, SOLUTION INTRAMUSCULAR; INTRAVENOUS; SUBCUTANEOUS at 19:02

## 2017-04-14 RX ADMIN — LIDOCAINE HYDROCHLORIDE 100 MG: 20 INJECTION, SOLUTION INFILTRATION; PERINEURAL at 07:31

## 2017-04-14 RX ADMIN — FLUCONAZOLE 200 MG: 2 INJECTION, SOLUTION INTRAVENOUS at 08:30

## 2017-04-14 RX ADMIN — PHENYLEPHRINE HYDROCHLORIDE 0.8 MCG/KG/MIN: 10 INJECTION, SOLUTION INTRAMUSCULAR; INTRAVENOUS; SUBCUTANEOUS at 11:51

## 2017-04-14 RX ADMIN — HUMAN INSULIN 3 UNITS/HR: 100 INJECTION, SOLUTION SUBCUTANEOUS at 10:31

## 2017-04-14 RX ADMIN — FENTANYL CITRATE 50 MCG: 50 INJECTION, SOLUTION INTRAMUSCULAR; INTRAVENOUS at 08:58

## 2017-04-14 RX ADMIN — ROCURONIUM BROMIDE 20 MG: 10 INJECTION INTRAVENOUS at 11:16

## 2017-04-14 RX ADMIN — PIPERACILLIN SODIUM AND TAZOBACTAM SODIUM 2.25 G: .375; 3 INJECTION, POWDER, LYOPHILIZED, FOR SOLUTION INTRAVENOUS at 16:30

## 2017-04-14 RX ADMIN — SODIUM CHLORIDE, POTASSIUM CHLORIDE, SODIUM LACTATE AND CALCIUM CHLORIDE: 600; 310; 30; 20 INJECTION, SOLUTION INTRAVENOUS at 19:01

## 2017-04-14 RX ADMIN — PIPERACILLIN SODIUM AND TAZOBACTAM SODIUM 3.38 G: .375; 3 INJECTION, POWDER, LYOPHILIZED, FOR SOLUTION INTRAVENOUS at 08:33

## 2017-04-14 RX ADMIN — PHENYLEPHRINE HYDROCHLORIDE 200 MCG: 10 INJECTION, SOLUTION INTRAMUSCULAR; INTRAVENOUS; SUBCUTANEOUS at 11:32

## 2017-04-14 RX ADMIN — ROCURONIUM BROMIDE 10 MG: 10 INJECTION INTRAVENOUS at 15:20

## 2017-04-14 RX ADMIN — HYDROMORPHONE HYDROCHLORIDE 0.2 MG: 10 INJECTION, SOLUTION INTRAMUSCULAR; INTRAVENOUS; SUBCUTANEOUS at 18:38

## 2017-04-14 RX ADMIN — PHENYLEPHRINE HYDROCHLORIDE 200 MCG: 10 INJECTION, SOLUTION INTRAMUSCULAR; INTRAVENOUS; SUBCUTANEOUS at 11:45

## 2017-04-14 RX ADMIN — ALBUMIN (HUMAN): 12.5 SOLUTION INTRAVENOUS at 08:40

## 2017-04-14 RX ADMIN — PIPERACILLIN SODIUM AND TAZOBACTAM SODIUM 2.25 G: .375; 3 INJECTION, POWDER, LYOPHILIZED, FOR SOLUTION INTRAVENOUS at 10:31

## 2017-04-14 RX ADMIN — ROCURONIUM BROMIDE 50 MG: 10 INJECTION INTRAVENOUS at 08:55

## 2017-04-14 RX ADMIN — PHENYLEPHRINE HYDROCHLORIDE 100 MCG: 10 INJECTION, SOLUTION INTRAMUSCULAR; INTRAVENOUS; SUBCUTANEOUS at 14:15

## 2017-04-14 RX ADMIN — PHENYLEPHRINE HYDROCHLORIDE 100 MCG: 10 INJECTION, SOLUTION INTRAMUSCULAR; INTRAVENOUS; SUBCUTANEOUS at 10:52

## 2017-04-14 RX ADMIN — PHENYLEPHRINE HYDROCHLORIDE 200 MCG: 10 INJECTION, SOLUTION INTRAMUSCULAR; INTRAVENOUS; SUBCUTANEOUS at 11:52

## 2017-04-14 RX ADMIN — SODIUM CHLORIDE, POTASSIUM CHLORIDE, SODIUM LACTATE AND CALCIUM CHLORIDE: 600; 310; 30; 20 INJECTION, SOLUTION INTRAVENOUS at 21:38

## 2017-04-14 RX ADMIN — PROPOFOL 200 MG: 10 INJECTION, EMULSION INTRAVENOUS at 07:34

## 2017-04-14 RX ADMIN — MENTHOL 1 PATCH: 205.5 PATCH TOPICAL at 21:27

## 2017-04-14 RX ADMIN — ROCURONIUM BROMIDE 10 MG: 10 INJECTION INTRAVENOUS at 12:33

## 2017-04-14 RX ADMIN — PHENYLEPHRINE HYDROCHLORIDE 100 MCG: 10 INJECTION, SOLUTION INTRAMUSCULAR; INTRAVENOUS; SUBCUTANEOUS at 12:25

## 2017-04-14 RX ADMIN — PHENYLEPHRINE HYDROCHLORIDE 100 MCG: 10 INJECTION, SOLUTION INTRAMUSCULAR; INTRAVENOUS; SUBCUTANEOUS at 10:53

## 2017-04-14 RX ADMIN — FENTANYL CITRATE 50 MCG: 50 INJECTION, SOLUTION INTRAMUSCULAR; INTRAVENOUS at 08:49

## 2017-04-14 RX ADMIN — FENTANYL CITRATE 50 MCG: 50 INJECTION, SOLUTION INTRAMUSCULAR; INTRAVENOUS at 12:09

## 2017-04-14 RX ADMIN — ROCURONIUM BROMIDE 10 MG: 10 INJECTION INTRAVENOUS at 16:03

## 2017-04-14 RX ADMIN — PIPERACILLIN SODIUM,TAZOBACTAM SODIUM 3.38 G: 3; .375 INJECTION, POWDER, FOR SOLUTION INTRAVENOUS at 21:33

## 2017-04-14 RX ADMIN — HYDROMORPHONE HYDROCHLORIDE: 10 INJECTION, SOLUTION INTRAMUSCULAR; INTRAVENOUS; SUBCUTANEOUS at 18:01

## 2017-04-14 RX ADMIN — DEXMEDETOMIDINE 4 MCG: 100 INJECTION, SOLUTION, CONCENTRATE INTRAVENOUS at 08:55

## 2017-04-14 RX ADMIN — FENTANYL CITRATE 100 MCG: 50 INJECTION, SOLUTION INTRAMUSCULAR; INTRAVENOUS at 07:31

## 2017-04-14 RX ADMIN — SUGAMMADEX 150 MG: 100 INJECTION, SOLUTION INTRAVENOUS at 16:42

## 2017-04-14 RX ADMIN — FENTANYL CITRATE 50 MCG: 50 INJECTION, SOLUTION INTRAMUSCULAR; INTRAVENOUS at 16:33

## 2017-04-14 RX ADMIN — ROCURONIUM BROMIDE 20 MG: 10 INJECTION INTRAVENOUS at 08:40

## 2017-04-14 RX ADMIN — ROCURONIUM BROMIDE 20 MG: 10 INJECTION INTRAVENOUS at 14:22

## 2017-04-14 RX ADMIN — PHENYLEPHRINE HYDROCHLORIDE 200 MCG: 10 INJECTION, SOLUTION INTRAMUSCULAR; INTRAVENOUS; SUBCUTANEOUS at 14:47

## 2017-04-14 RX ADMIN — SODIUM CHLORIDE: 9 INJECTION, SOLUTION INTRAVENOUS at 07:45

## 2017-04-14 RX ADMIN — HYDROMORPHONE HYDROCHLORIDE 0.3 MG: 10 INJECTION, SOLUTION INTRAMUSCULAR; INTRAVENOUS; SUBCUTANEOUS at 18:20

## 2017-04-14 RX ADMIN — ROCURONIUM BROMIDE 80 MG: 10 INJECTION INTRAVENOUS at 07:34

## 2017-04-14 RX ADMIN — ROCURONIUM BROMIDE 10 MG: 10 INJECTION INTRAVENOUS at 12:03

## 2017-04-14 ASSESSMENT — PAIN DESCRIPTION - DESCRIPTORS: DESCRIPTORS: ACHING

## 2017-04-14 ASSESSMENT — VISUAL ACUITY: OU: NORMAL ACUITY;GLASSES

## 2017-04-14 NOTE — PROGRESS NOTES
Pharyngostomy tube pulled back 4cm after review of CXR. Sutured in new position. The patient tolerated this well.    Marc Luna  Thoracic Surgery

## 2017-04-14 NOTE — ANESTHESIA PROCEDURE NOTES
Arterial Line Procedure Note  Staff:     Resident/CRNA:  SANYA ZIEGLER    Arterial line performed by resident/CRNA in presence of a teaching physician    Location: In OR After Induction  Procedure Start/Stop Times:     patient identified, IV checked, risks and benefits discussed, informed consent, monitors and equipment checked and pre-op evaluation      Correct Patient: Yes      Correct Position: Yes      Correct Site: Yes      Correct Procedure: Yes      Correct Laterality:  N/A    Site Marked:  N/A  Line Placement:     Procedure:  Arterial Line    Insertion Site:  Radial    Insertion laterality:  Right    Skin Prep: Chloraprep      Patient Prep: patient draped, mask, sterile gloves, hat and hand hygiene      Local skin infiltration:  None    Ultrasound Guided?: No      Catheter size:  20 gauge, 12 cm    Cath secured with: suture      Dressing:  Tegaderm    Complications:  None obvious    Arterial waveform: Yes      IBP within 10% of NIBP: Yes

## 2017-04-14 NOTE — ANESTHESIA CARE TRANSFER NOTE
Patient: Jim Braden    Procedure(s):  Flexible Bronchoscopy, Esophagogastroduodenoscopy, Thoracoscopic Laparoscopic Esophagectomy with cervical anastamosis, Laparoscopic Jejunostomy Tube, Pharyngostomy Tube Placement, Bilateral Thorostomy Tube Placement - Wound Class: I-Clean   - Wound Class: I-Clean   - Wound Class: II-Clean Contaminated   - Wound Class: II-Clean Contaminated    Diagnosis: Esophageal Cancer   Diagnosis Additional Information: No value filed.    Anesthesia Type:   General     Note:  Airway :Face Mask  Patient transferred to:PACU  Comments: VSS. Report to RN. Sp02 100%. Patient denies pain. Lungs clear. 117/54.       Vitals: (Last set prior to Anesthesia Care Transfer)    CRNA VITALS  4/14/2017 1634 - 4/14/2017 1717      4/14/2017             Pulse: (!)  30    SpO2: 93 %    Resp Rate (observed): (!)  4    Resp Rate (set): 10                Electronically Signed By: TOMA Emanuel CRNA  April 14, 2017  5:17 PM

## 2017-04-14 NOTE — H&P
SURGICAL ICU ADMISSION NOTE  4/14/2017    PRIMARY TEAM: Thoracic Surgery  PRIMARY PHYSICIAN: Dr. Mendez    REASON FOR CRITICAL CARE ADMISSION: S/p Esophagectomy 2/2 Stage IIIC (uT3N3) Esophageal adenocarcinoma     ADMITTING PHYSICIAN: FROY Carrington MD    ASSESSMENT: 56 year old M (BMI 25) with PMH of GERD, HTN, LE DVT on therapeautic lovenox, Alpha 1 antitrypsin deficiency, Former Smoker, and Distal Esophageal Adenocarcinoma- Stage IIIC (uT3N3) s/p neoadjuvant FOLFOX chemotherapy, now s/p EGD, Laparoscopic Distal Esophagectomy with Gastric conduit and primary anastomosis,  Pharyngostomy tube placement, jejunostomy, and bilateral chest tube placement, admitted to the SICU for post operative management.    PLAN:   Neuro/ pain/ sedation:  #Acute on Chronic pain  #Hx of EtOH use, former significant, now states 1-2 beer per day  - Dilauded PCA, IV Tylenol     Pulmonary care:   #Respiratory Insufficiency  #b/l chest tube placement  -b/l CT to water seal  -Supplemental oxygen to keep saturation above 92 %.  -Capnography     Cardiovascular:    #HDS  - Monitor hemodynamic status. Stable.     GI care:   # Distal Esophageal Adenocarcinoma- Stage IIIC (uT3N3) s/p neoadjuvant FOLFOX chemotherapy  # s/p neoadjuvant FOLFOX chemotherapy, now s/p EGD,  Laparoscopic Distal Esophagectomy with Gastric conduit and primary anastomosis, J tube placement, Pharyngostomy tube placement, and bilateral chest tube placement  # h/o GERD  - NPO except ice chips and medications.  - Pharyngostomy to LIS, flushes [id number]  - J tube- to gravity  - No feeds  - PPI     Fluids/ Electrolytes/ Nutrition:   - LR @ 50 cc for IV fluid hydration  - ICU electrolyte replacement protocol  - No indication for parenteral nutrition.  - Nutrition consulted. Appreciate recs     Renal/ Fluid Balance:    - Will monitor intake and output.  - Florentino for Strict I/O.     Endocrine:    #No history of DM  - q4h FSBG while NPO     ID/ Antibiotics:  #Perioperative Infection  Prophylaxis  - Periop Fluconazole, Zosyn     Heme:     #HDS  - Re-check labs in AM     Prophylaxis:    #H/o GERD  #H/o LE DVT on therapeautic lovenox as outpatient  - Mechanical prophylaxis for DVT.   - Start heparin drip POD#1  - IV PPI     MSK:    - PT and OT consulted. Appreciate recs.     Lines/ tubes/ drains:  - R CT, L CT, Pharyngostomy Tube, J tube, PIV, A line     Disposition:  - Surgical ICU.    Patient seen, findings and plan discussed with surgical ICU staff.    Crispin Barryyaquelin, PGY2  [phone]     - - - - - - - - - - - - - - - - - - - - - - - - - - - - - - - - - - - - - - - - - - - - - - - - - - - - - - - - - - - - - - - - - - - - - - - -     HISTORY PRESENTING ILLNESS: 56 year old M (BMI 25) with PMH of GERD, HTN, LE DVT on therapeautic lovenox, Alpha 1 antitrypsin deficiency, and Distal Esophageal Adenocarcinoma- Stage IIIC (uT3N3) s/p neoadjuvant FOLFOX chemotherapy, now s/p EGD, Distal Esophagectomy with anastomosis, J tube placement, Pharyngostomy tube placement, and bilateral chest tube placement, admitted to the SICU for post operative management.  Patient has a history of dysphagia starting in November 2016 which was evaluated by his primary care physician and was noted to have a fungating mass on EGD in December 2016.  Patient underwent biopsy showing esophageal adenocarcinoma.  Pet scan revealed regional lymph node involvement >7 nodes (uT3N3, Stage IIIC).  And has underwent neoadjuvant chemotherapy with FOLFOX regimen.  Admitted to the hospital for surgical resection of the tumor with Dr. Mendez and thoracic surgery    REVIEW OF SYSTEMS: 10 point ROS neg other than the symptoms noted above in the HPI.    PAST MEDICAL HISTORY:   Past Medical History:   Diagnosis Date     Alpha-1-antitrypsin deficiency carrier (H)      Esophageal cancer (H)      GERD (gastroesophageal reflux disease)      HTN (hypertension)      Leg DVT (deep venous thromboembolism), acute, right (H)        SURGICAL HISTORY:    Past Surgical History:   Procedure Laterality Date     APPENDECTOMY       COLONOSCOPY       ORTHOPEDIC SURGERY      Left arm after fracture       SOCIAL HISTORY:   Social History     Social History     Marital status:      Spouse name: N/A     Number of children: N/A     Years of education: N/A     Social History Main Topics     Smoking status: Former Smoker     Packs/day: 1.00     Types: Cigarettes     Smokeless tobacco: None      Comment: Quit 2007     Alcohol use 0.0 oz/week     0 Standard drinks or equivalent per week      Comment: 1-2 cans of beer day     Drug use: No     Sexual activity: Not Asked     Other Topics Concern     None     Social History Narrative     Former Smoker, mild-moderate alcohol consumption.     FAMILY HISTORY: No bleeding/clotting disorders nor problems with anesthesia.    ALLERGIES:    No Known Allergies    MEDICATIONS:    No current facility-administered medications on file prior to encounter.   Current Outpatient Prescriptions on File Prior to Encounter:  enoxaparin (LOVENOX) 80 MG/0.8ML injection Inject 80 mg Subcutaneous 2 times daily   omeprazole (PRILOSEC) 20 MG CR capsule Take 20 mg by mouth every morning Reported on 3/15/2017   lisinopril (PRINIVIL/ZESTRIL) 5 MG tablet Take 5 mg by mouth every evening Reported on 3/15/2017       PHYSICAL EXAMINATION:  Temp:  [98  F (36.7  C)] 98  F (36.7  C)  Pulse:  [71] 71  Resp:  [16] 16  BP: (151)/(92) 151/92  SpO2:  [98 %] 98 %    General appearance: in NAD  HEENT: pharyngostomy to suction  Neuro: A & O x 3; follows commands, moving all extremities spontaneously   Pulm: Non-labored breathing, CTs in place to water seal.   Abd: soft; NT, ND  Extremities: no edema  Skin: warm and well-perfused.     LABS: Reviewed.   Arterial Blood Gases     Recent Labs  Lab 04/14/17  1530 04/14/17  1330 04/14/17  1151 04/14/17  1020   PH 7.28* 7.28* 7.29* 7.19*   PCO2 41 42 43 58*   PO2 126* 146* 149* 85   HCO3 19* 19* 21 22     Complete  Blood Count     Recent Labs  Lab 04/14/17  1530 04/14/17  1330 04/14/17  1151 04/14/17  1020   HGB 12.2* 12.7* 12.4* 13.1*     Basic Metabolic Panel    Recent Labs  Lab 04/14/17  1530 04/14/17  1330 04/14/17  1151 04/14/17  1020    141 139 139   POTASSIUM 4.7 4.2 5.2 5.3   * 142* 155* 206*

## 2017-04-14 NOTE — BRIEF OP NOTE
[facility], [place]    Brief Operative Note    Pre-operative diagnosis: Esophageal Cancer     Post-operative diagnosis Esophageal Cancer    Procedure: Procedure(s):  Flexible Bronchoscopy, Esophagogastroduodenoscopy, Thoracoscopic Laparoscopic Esophagectomy with cervical anastamosis, Laparoscopic Jejunostomy Tube, Pharyngostomy Tube Placement, Bilateral Thorostomy Tube Placement - Wound Class: I-Clean   - Wound Class: I-Clean   - Wound Class: II-Clean Contaminated   - Wound Class: II-Clean Contaminated    Surgeon: Surgeon(s) and Role:     * Jeronimo Mendez MD - Primary     * Marc Luna MD     * Lucy Nobles MD - Assisting     * Pastor Loco MD - Resident - Assisting    Anesthesia: General     Estimated blood loss: 400 ml    Drains:   - G tube to gravity  - Pharyngostomy tube to LIS  - Chest tubes x 2 to water seal    Specimens:   ID Type Source Tests Collected by Time Destination   A : Subcarinal Lymph node Tissue Lymph Node, Subcarinal (7) SURGICAL PATHOLOGY EXAM Jeronimo Mendez MD 4/14/2017 10:06 AM    B : Level 7 Tissue Lymph Node SURGICAL PATHOLOGY EXAM Jeronimo Mendez MD 4/14/2017 10:22 AM    C : Left Gastric  Artery Lymph Node Tissue Lymph Node SURGICAL PATHOLOGY EXAM Jeronimo Mendez MD 4/14/2017  1:55 PM    D : Common Hepatic  Artery Lymph Node Tissue Lymph Node SURGICAL PATHOLOGY EXAM Jeronimo Mendez MD 4/14/2017  1:58 PM    E : Final Gastric Margin Tissue Stomach, Body SURGICAL PATHOLOGY EXAM Jeronimo Mendez MD 4/14/2017  2:58 PM    F : Esophagogastrectomy Tissue Esophagus SURGICAL PATHOLOGY EXAM Jeronimo Mendez MD 4/14/2017  3:01 PM      Findings:   None.    Complications: None.    Implants: None.        - - - - - - - - - - - -  Pastor Loco MD  PGY-1, General Surgery  [facility]  Pager: [phone]

## 2017-04-14 NOTE — ANESTHESIA POSTPROCEDURE EVALUATION
Patient: Jim Braden    Procedure(s):  Flexible Bronchoscopy, Esophagogastroduodenoscopy, Thoracoscopic Laparoscopic Esophagectomy with cervical anastamosis, Laparoscopic Jejunostomy Tube, Pharyngostomy Tube Placement, Bilateral Thorostomy Tube Placement - Wound Class: I-Clean   - Wound Class: I-Clean   - Wound Class: II-Clean Contaminated   - Wound Class: II-Clean Contaminated    Diagnosis:Esophageal Cancer   Diagnosis Additional Information: No value filed.    Anesthesia Type:  General    Note:  Anesthesia Post Evaluation    Patient location during evaluation: PACU  Patient participation: Able to fully participate in evaluation  Level of consciousness: awake  Pain management: adequate  Airway patency: patent  Cardiovascular status: acceptable  Respiratory status: acceptable  Hydration status: acceptable  PONV: none     Anesthetic complications: None          Last vitals:  Vitals:    04/14/17 1745 04/14/17 1800 04/14/17 1815   BP: 118/76 122/72 136/88   Pulse:      Resp: 16 16 16   Temp: 36.5  C (97.7  F)     SpO2: 100% 100% 100%         Electronically Signed By: Elizabeth Leiva MD  April 14, 2017  6:37 PM

## 2017-04-14 NOTE — IP AVS SNAPSHOT
[address] [address]    Phone:  [phone]                                       After Visit Summary   4/14/2017    Jim Braden    MRN: [id number]           After Visit Summary Signature Page     I have received my discharge instructions, and my questions have been answered. I have discussed any challenges I see with this plan with the nurse or doctor.    ..........................................................................................................................................  Patient/Patient Representative Signature      ..........................................................................................................................................  Patient Representative Print Name and Relationship to Patient    ..................................................               ................................................  Date                                            Time    ..........................................................................................................................................  Reviewed by Signature/Title    ...................................................              ..............................................  Date                                                            Time

## 2017-04-14 NOTE — IP AVS SNAPSHOT
MRN:[id number]                      After Visit Summary   4/14/2017    Jim Braden    MRN: [id number]           Thank you!     Thank you for choosing [facility] for your care. Our goal is always to provide you with excellent care. Hearing back from our patients is one way we can continue to improve our services. Please take a few minutes to complete the written survey that you may receive in the mail after you visit with us. Thank you!        Patient Information     Date Of Birth          1960        Designated Caregiver       Most Recent Value    Caregiver    Will someone help with your care after discharge? yes    Name of designated caregiver Abril    Phone number of caregiver [phone]    Caregiver address in [place]      About your hospital stay     You were admitted on:  April 14, 2017 You last received care in the:  [address] [facility] [facility]    You were discharged on:  May 1, 2017        Reason for your hospital stay       Underwent esophagectomy                  Who to Call     For medical emergencies, please call 911.  For non-urgent questions about your medical care, please call your primary care provider or clinic, [phone]  For questions related to your surgery, please call your surgery clinic        Attending Provider     Provider Specialty    Lexus Carrington MD Internal Medicine    MendezJeronimo mix MD Thoracic Diseases       Primary Care Provider Office Phone # Fax #    Edith Dan [phone]       [address] [address]        After Care Instructions     Activity       Your activity upon discharge: activity as tolerated            Diet       Follow this diet upon discharge:       Adult Formula Drip Feeding: Specified Time Isosource 1.5; Jejunostomy; Goal Rate: 110; mL/hr; From: 4:00 PM; 8:00 AM; Medication - Tube Feeding Flush Frequency: At least 15-30 mL water before and after medication  administration and with tube clogging    Nothing by mouth except for ice chips. OK to have up to 1 cup of ice chips every 8 hours until follow-up appointment with Thoracic Surgery.            Discharge Instructions       THORACIC SURGERY DISCHARGE INSTRUCTIONS    DIET: Nothing by mouth except for ice chips, up to 1 cup every 8 hours.    If your plans upon discharge include prolonged periods of sitting (i.e a lengthy car or plane ride), it is highly beneficial to get up and walk at least once per hour to help prevent swelling and blood clots.     You may get incision wet. Do not submerge, soak, or scrub incision or swim until seen in follow-up.    Take incentive spirometer home for continued frequent use    Activity as tolerated, no strenous activity until seen in follow-up, no lifting greater than 20 pounds for the next 4 weeks.    Stay hydrated. Take over the counter fiber (metamucil or benefiber) and stool softeners (Miralax, docusate or senna) if becoming constipated.     Call for fever greater than 101.5, chills, increased size of incision, red skin around incision, vision changes, muscle strength changes, sensation changes, shortness of breath, or other concerns.    No driving while taking narcotic pain medication.    Transition to ibuprofen or tylenol/acetaminophen for pain control. Do not take tylenol/acetaminophen and acetaminophen containing narcotic (e.g., percocet or vicodin) at the same time. If you have known ulcer problems, or kidney trouble (elevated creatinine) do not take the ibuprofen.    In emergencies, call 911    For other Questions or Concerns;   A.) During weekday working hours (Monday through Friday 8am to 4:30pm)   call [phone] and ask to speak to a clinical nurse specialist.     B.) At nights (after 4:30pm), on weekends, or if urgent call [phone] and   tell the   I would like to page job code [phone], the thoracic surgery   fellow on call, please.             Wound  care and dressings       Instructions to care for your wound at home: as directed.    Gauze dampened with saline packed in neck wound, to be changed once daily and as needed. Right chest tube site to have Tegaderm with gauze pad on wound, keep in place but change if drainage leaks through dressing.                  Follow-up Appointments     Adult [facility] Follow-up and recommended labs and tests       Follow up with Dr. Mendez , at (location with clinic name or city) [facility] Thoracic Surgery clinic, within 2 weeks  to evaluate after surgery. No follow up labs or test are needed.    Appointments on [place] and/or [facility] (with [facility] or [facility] provider or service). Call [phone] if you haven't heard regarding these appointments within 7 days of discharge.                  Additional Services     Home care nursing referral       _______________________  [facility]  Phone  [phone]  Fax  [phone]  ______________________    RN skilled nursing visit. RN to assess vital signs and weight, respiratory and cardiac status, pain level and activity tolerance, incision for signs/symptoms of infection, hydration, nutrition and bowel status and home safety.  RN to teach medication management and tube feedings.  RN to reinforce education with patient and caregiver on tube feedings via J tube.    RN to provide tube site care and management and lab draws.     Your provider has ordered home care nursing services. If you have not been contacted within 2 days of your discharge please call the Home Care Agency at [phone].            Home infusion referral       [facility]---Tube feeding supplies  Ph: [phone]  Fax: [phone]      [facility]  --enteral formula  Ph: [phone]  Fax: [phone]                  Pending Results     No orders found from 4/12/2017 to 4/15/2017.            Statement of Approval     Ordered          05/01/17 1327  I have reviewed and agree with all the recommendations  "and orders detailed in this document.  EFFECTIVE NOW     Approved and electronically signed by:  Corby Palumbo MD             Admission Information     Date & Time Provider Department Dept. Phone    2017 Jeronimo Mendez MD [address] [facility] [place] [phone]      Your Vitals Were     Blood Pressure Pulse Temperature Respirations Height Weight    126/72 (BP Location: Right arm) 108 98.3  F (36.8  C) 16 1.803 m (5' 11\") 78.7 kg (173 lb 7.6 oz)    Pulse Oximetry BMI (Body Mass Index)                94% 24.2 kg/m2          MyChart Information     [facility] lets you send messages to your doctor, view your test results, renew your prescriptions, schedule appointments and more. To sign up, go to [email or web address] . Click on \"Log in\" on the left side of the screen, which will take you to the Welcome page. Then click on \"Sign up Now\" on the right side of the page.     You will be asked to enter the access code listed below, as well as some personal information. Please follow the directions to create your username and password.     Your access code is: [email or web address]  Expires: 2017 11:30 AM     Your access code will  in 90 days. If you need help or a new code, please call your [facility] clinic or [phone].        Care EveryWhere ID     This is your Care EveryWhere ID. This could be used by other organizations to access your [facility] medical records  [id number]           Review of your medicines      START taking        Dose / Directions    amoxicillin-clavulanate 400-57 MG/5ML suspension   Commonly known as:  AUGMENTIN   Indication:  Abscess        Dose:  500 mg   6.3 mLs (500 mg) by Per J Tube route 2 times daily for 10 days   Quantity:  126 mL   Refills:  0       bisacodyl 10 MG Suppository   Commonly known as:  DULCOLAX        Dose:  10 mg   Place 1 suppository (10 mg) rectally daily as needed for constipation   Quantity:  30 suppository   Refills:  0       ondansetron 4 MG ODT tab "   Commonly known as:  ZOFRAN-ODT        Dose:  4 mg   Take 1 tablet (4 mg) by mouth every 6 hours as needed for nausea   Quantity:  120 tablet   Refills:  0       oxyCODONE 5 MG/5ML solution   Commonly known as:  ROXICODONE        Dose:  5 mg   5 mLs (5 mg) by Per J Tube route every 4 hours as needed for moderate to severe pain   Quantity:  150 mL   Refills:  0       pantoprazole Susp suspension   Commonly known as:  PROTONIX        Dose:  40 mg   20 mLs (40 mg) by Per Feeding Tube route 2 times daily for 14 days   Quantity:  560 mL   Refills:  0       phenol 1.4 % Liqd spray   Commonly known as:  CHLORASEPTIC        Dose:  1 spray   Take 1 spray (1 mL) by mouth every hour as needed for sore throat   Quantity:  177 mL   Refills:  0       polyethylene glycol Packet   Commonly known as:  MIRALAX/GLYCOLAX        Dose:  17 g   17 g by Per Feeding Tube route daily   Quantity:  30 packet   Refills:  0       sennosides 1.76 mg/mL syrup   Commonly known as:  SENOKOT        Dose:  5-10 mL   5-10 mLs by Per J Tube route daily as needed for constipation   Quantity:  105 mL   Refills:  0         CONTINUE these medicines which may have CHANGED, or have new prescriptions. If we are uncertain of the size of tablets/capsules you have at home, strength may be listed as something that might have changed.        Dose / Directions    enoxaparin 80 MG/0.8ML injection   Commonly known as:  LOVENOX   This may have changed:    - how much to take  - when to take this   Used for:  History of deep venous thrombosis        Dose:  1 mg/kg   Inject 0.79 mLs (79 mg) Subcutaneous every 12 hours   Quantity:  47.4 mL   Refills:  0         CONTINUE these medicines which have NOT CHANGED        Dose / Directions    ACETAMINOPHEN PO        Dose:  500-1000 mg   Take 500-1,000 mg by mouth every 8 hours as needed for pain   Refills:  0       lisinopril 5 MG tablet   Commonly known as:  PRINIVIL/ZESTRIL        Dose:  5 mg   Take 5 mg by mouth every evening  Reported on 3/15/2017   Refills:  0       omeprazole 20 MG CR capsule   Commonly known as:  priLOSEC        Dose:  20 mg   Take 20 mg by mouth every morning Reported on 3/15/2017   Refills:  0            Where to get your medicines      These medications were sent to [facility] - [place], [place] - [address]  [address] [address]     Phone:  [phone]     amoxicillin-clavulanate 400-57 MG/5ML suspension    bisacodyl 10 MG Suppository    enoxaparin 80 MG/0.8ML injection    ondansetron 4 MG ODT tab    pantoprazole Susp suspension    phenol 1.4 % Liqd spray    polyethylene glycol Packet    sennosides 1.76 mg/mL syrup         Some of these will need a paper prescription and others can be bought over the counter. Ask your nurse if you have questions.     Bring a paper prescription for each of these medications     oxyCODONE 5 MG/5ML solution                Protect others around you: Learn how to safely use, store and throw away your medicines at www.disposemymeds.org.             Medication List: This is a list of all your medications and when to take them. Check marks below indicate your daily home schedule. Keep this list as a reference.      Medications           Morning Afternoon Evening Bedtime As Needed    ACETAMINOPHEN PO   Take 500-1,000 mg by mouth every 8 hours as needed for pain   Last time this was given:  650 mg on 5/1/2017  9:46 AM                                amoxicillin-clavulanate 400-57 MG/5ML suspension   Commonly known as:  AUGMENTIN   6.3 mLs (500 mg) by Per J Tube route 2 times daily for 10 days   Last time this was given:  500 mg on 5/1/2017 12:57 PM                                bisacodyl 10 MG Suppository   Commonly known as:  DULCOLAX   Place 1 suppository (10 mg) rectally daily as needed for constipation   Last time this was given:  10 mg on 4/25/2017  7:59 AM                                enoxaparin 80 MG/0.8ML injection   Commonly known  as:  LOVENOX   Inject 0.79 mLs (79 mg) Subcutaneous every 12 hours   Last time this was given:  80 mg on 5/1/2017  9:47 AM                                lisinopril 5 MG tablet   Commonly known as:  PRINIVIL/ZESTRIL   Take 5 mg by mouth every evening Reported on 3/15/2017                                omeprazole 20 MG CR capsule   Commonly known as:  priLOSEC   Take 20 mg by mouth every morning Reported on 3/15/2017                                ondansetron 4 MG ODT tab   Commonly known as:  ZOFRAN-ODT   Take 1 tablet (4 mg) by mouth every 6 hours as needed for nausea                                oxyCODONE 5 MG/5ML solution   Commonly known as:  ROXICODONE   5 mLs (5 mg) by Per J Tube route every 4 hours as needed for moderate to severe pain   Last time this was given:  5 mg on 5/1/2017 12:57 PM                                pantoprazole Susp suspension   Commonly known as:  PROTONIX   20 mLs (40 mg) by Per Feeding Tube route 2 times daily for 14 days   Last time this was given:  40 mg on 5/1/2017  9:47 AM                                phenol 1.4 % Liqd spray   Commonly known as:  CHLORASEPTIC   Take 1 spray (1 mL) by mouth every hour as needed for sore throat   Last time this was given:  1 mL on 4/18/2017  3:57 PM                                polyethylene glycol Packet   Commonly known as:  MIRALAX/GLYCOLAX   17 g by Per Feeding Tube route daily   Last time this was given:  17 g on 4/18/2017  9:18 AM                                sennosides 1.76 mg/mL syrup   Commonly known as:  SENOKOT   5-10 mLs by Per J Tube route daily as needed for constipation   Last time this was given:  5 mLs on 4/27/2017  9:01 PM

## 2017-04-14 NOTE — ANESTHESIA PROCEDURE NOTES
Central Line Procedure Note  Staff:     Anesthesiologist:  LIZ LARA  Location: In OR after induction  Procedure Start/Stop Times:     patient identified and IV checked      Correct Patient: Yes      Correct Position: Yes      Correct Site: Yes      Correct Procedure: Yes      Correct Laterality:  Yes  Line Placement:     Procedure:  Central Line    Insertion laterality:  Right    Insertion site:  Internal Jugular    Position:  Trendelenburg      Maximal Sterile Barriers: All elements of maximal sterile barrier technique followed      (Maximal sterile barriers include:   Sterile gown, Sterile Gloves, Mask, Cap, Whole body draped, hand hygiene and acceptable skin prep).Skin Prep: Chloraprep         Injection Technique:  Ultrasound guided    Sterile Ultrasound Technique:  Sterile probe cover and Sterile gel    Vein evaluated via U/S for patency/adequacy of catheter insertion and is adequate.  Using realtime U/S imaging the vein was punctured, and needle was observed entering vein on U/S      Permanent Image entered into patient's record      Local skin infiltration:  None  Assessment/Narrative:      Easy TWN cannulation under US guidance but unable to pass the wire beyond 20 cm.  Patient has recent RIJ port placement.  Tried three passes and pulling right arm with no ability to pass wire further.  Discussed with the surgeon, will abort attmpt - does not want IJ line on the left because of surgery.  Three PIVs placed.  No hematoma.

## 2017-04-15 ENCOUNTER — APPOINTMENT (OUTPATIENT)
Dept: GENERAL RADIOLOGY | Facility: CLINIC | Age: 57
DRG: 357 | End: 2017-04-15
Attending: PHYSICIAN ASSISTANT
Payer: COMMERCIAL

## 2017-04-15 ENCOUNTER — APPOINTMENT (OUTPATIENT)
Dept: GENERAL RADIOLOGY | Facility: CLINIC | Age: 57
DRG: 357 | End: 2017-04-15
Attending: THORACIC SURGERY (CARDIOTHORACIC VASCULAR SURGERY)
Payer: COMMERCIAL

## 2017-04-15 LAB
ANION GAP SERPL CALCULATED.3IONS-SCNC: 11 MMOL/L (ref 3–14)
ANION GAP SERPL CALCULATED.3IONS-SCNC: 7 MMOL/L (ref 3–14)
BASE DEFICIT BLDA-SCNC: 4.7 MMOL/L
BUN SERPL-MCNC: 25 MG/DL (ref 7–30)
BUN SERPL-MCNC: 26 MG/DL (ref 7–30)
CALCIUM SERPL-MCNC: 8.2 MG/DL (ref 8.5–10.1)
CALCIUM SERPL-MCNC: 8.4 MG/DL (ref 8.5–10.1)
CHLORIDE SERPL-SCNC: 109 MMOL/L (ref 94–109)
CHLORIDE SERPL-SCNC: 110 MMOL/L (ref 94–109)
CO2 SERPL-SCNC: 21 MMOL/L (ref 20–32)
CO2 SERPL-SCNC: 26 MMOL/L (ref 20–32)
CREAT SERPL-MCNC: 1.13 MG/DL (ref 0.66–1.25)
CREAT SERPL-MCNC: 1.34 MG/DL (ref 0.66–1.25)
ERYTHROCYTE [DISTWIDTH] IN BLOOD BY AUTOMATED COUNT: 15.1 % (ref 10–15)
GFR SERPL CREATININE-BSD FRML MDRD: 55 ML/MIN/1.7M2
GFR SERPL CREATININE-BSD FRML MDRD: 67 ML/MIN/1.7M2
GLUCOSE BLDC GLUCOMTR-MCNC: 131 MG/DL (ref 70–99)
GLUCOSE BLDC GLUCOMTR-MCNC: 131 MG/DL (ref 70–99)
GLUCOSE BLDC GLUCOMTR-MCNC: 134 MG/DL (ref 70–99)
GLUCOSE BLDC GLUCOMTR-MCNC: 137 MG/DL (ref 70–99)
GLUCOSE SERPL-MCNC: 144 MG/DL (ref 70–99)
GLUCOSE SERPL-MCNC: 167 MG/DL (ref 70–99)
HCO3 BLD-SCNC: 21 MMOL/L (ref 21–28)
HCT VFR BLD AUTO: 38 % (ref 40–53)
HGB BLD-MCNC: 12.4 G/DL (ref 13.3–17.7)
LMWH PPP CHRO-ACNC: 0.25 IU/ML
MAGNESIUM SERPL-MCNC: 2 MG/DL (ref 1.6–2.3)
MCH RBC QN AUTO: 31.6 PG (ref 26.5–33)
MCHC RBC AUTO-ENTMCNC: 32.6 G/DL (ref 31.5–36.5)
MCV RBC AUTO: 97 FL (ref 78–100)
O2/TOTAL GAS SETTING VFR VENT: ABNORMAL %
PCO2 BLD: 40 MM HG (ref 35–45)
PH BLD: 7.33 PH (ref 7.35–7.45)
PLATELET # BLD AUTO: 173 10E9/L (ref 150–450)
PO2 BLD: 93 MM HG (ref 80–105)
POTASSIUM SERPL-SCNC: 4.2 MMOL/L (ref 3.4–5.3)
POTASSIUM SERPL-SCNC: 4.9 MMOL/L (ref 3.4–5.3)
RBC # BLD AUTO: 3.92 10E12/L (ref 4.4–5.9)
SODIUM SERPL-SCNC: 141 MMOL/L (ref 133–144)
SODIUM SERPL-SCNC: 141 MMOL/L (ref 133–144)
WBC # BLD AUTO: 8.2 10E9/L (ref 4–11)

## 2017-04-15 PROCEDURE — 80048 BASIC METABOLIC PNL TOTAL CA: CPT | Performed by: INTERNAL MEDICINE

## 2017-04-15 PROCEDURE — 25000125 ZZHC RX 250: Performed by: PHYSICIAN ASSISTANT

## 2017-04-15 PROCEDURE — 40000275 ZZH STATISTIC RCP TIME EA 10 MIN

## 2017-04-15 PROCEDURE — 25000125 ZZHC RX 250: Performed by: STUDENT IN AN ORGANIZED HEALTH CARE EDUCATION/TRAINING PROGRAM

## 2017-04-15 PROCEDURE — 20000004 ZZH R&B ICU UMMC

## 2017-04-15 PROCEDURE — 25000128 H RX IP 250 OP 636: Performed by: SURGERY

## 2017-04-15 PROCEDURE — 40000940 XR CHEST PORT 1 VW

## 2017-04-15 PROCEDURE — 71010 XR CHEST PORT 1 VW: CPT

## 2017-04-15 PROCEDURE — 85027 COMPLETE CBC AUTOMATED: CPT | Performed by: INTERNAL MEDICINE

## 2017-04-15 PROCEDURE — 94640 AIRWAY INHALATION TREATMENT: CPT

## 2017-04-15 PROCEDURE — 25000132 ZZH RX MED GY IP 250 OP 250 PS 637: Performed by: SURGERY

## 2017-04-15 PROCEDURE — 85520 HEPARIN ASSAY: CPT | Performed by: INTERNAL MEDICINE

## 2017-04-15 PROCEDURE — 00000146 ZZHCL STATISTIC GLUCOSE BY METER IP

## 2017-04-15 PROCEDURE — 36600 WITHDRAWAL OF ARTERIAL BLOOD: CPT

## 2017-04-15 PROCEDURE — 36415 COLL VENOUS BLD VENIPUNCTURE: CPT | Performed by: INTERNAL MEDICINE

## 2017-04-15 PROCEDURE — 85520 HEPARIN ASSAY: CPT | Performed by: STUDENT IN AN ORGANIZED HEALTH CARE EDUCATION/TRAINING PROGRAM

## 2017-04-15 PROCEDURE — 27210577 ZZ H INTRODUCER MICRO SET

## 2017-04-15 PROCEDURE — 94640 AIRWAY INHALATION TREATMENT: CPT | Mod: 76

## 2017-04-15 PROCEDURE — 25000128 H RX IP 250 OP 636: Performed by: STUDENT IN AN ORGANIZED HEALTH CARE EDUCATION/TRAINING PROGRAM

## 2017-04-15 PROCEDURE — 36569 INSJ PICC 5 YR+ W/O IMAGING: CPT

## 2017-04-15 PROCEDURE — 82803 BLOOD GASES ANY COMBINATION: CPT | Performed by: INTERNAL MEDICINE

## 2017-04-15 PROCEDURE — 27210195 ZZH KIT POWER PICC DOUBLE LUMEN

## 2017-04-15 PROCEDURE — 25000125 ZZHC RX 250: Performed by: SURGERY

## 2017-04-15 PROCEDURE — 25000132 ZZH RX MED GY IP 250 OP 250 PS 637: Performed by: PHYSICIAN ASSISTANT

## 2017-04-15 PROCEDURE — 83735 ASSAY OF MAGNESIUM: CPT | Performed by: INTERNAL MEDICINE

## 2017-04-15 RX ORDER — OXYCODONE HCL 5 MG/5 ML
5 SOLUTION, ORAL ORAL EVERY 4 HOURS
Status: DISCONTINUED | OUTPATIENT
Start: 2017-04-15 | End: 2017-05-01 | Stop reason: HOSPADM

## 2017-04-15 RX ORDER — HEPARIN SODIUM 10000 [USP'U]/100ML
0-3500 INJECTION, SOLUTION INTRAVENOUS CONTINUOUS
Status: DISCONTINUED | OUTPATIENT
Start: 2017-04-15 | End: 2017-04-18

## 2017-04-15 RX ORDER — METHOCARBAMOL 500 MG/1
500 TABLET, FILM COATED ORAL 4 TIMES DAILY PRN
Status: DISCONTINUED | OUTPATIENT
Start: 2017-04-15 | End: 2017-05-01 | Stop reason: HOSPADM

## 2017-04-15 RX ORDER — NICOTINE POLACRILEX 4 MG
15-30 LOZENGE BUCCAL
Status: DISCONTINUED | OUTPATIENT
Start: 2017-04-15 | End: 2017-05-01 | Stop reason: HOSPADM

## 2017-04-15 RX ORDER — HEPARIN SODIUM,PORCINE 10 UNIT/ML
2-5 VIAL (ML) INTRAVENOUS
Status: COMPLETED | OUTPATIENT
Start: 2017-04-15 | End: 2017-04-17

## 2017-04-15 RX ORDER — IPRATROPIUM BROMIDE AND ALBUTEROL SULFATE 2.5; .5 MG/3ML; MG/3ML
3 SOLUTION RESPIRATORY (INHALATION) EVERY 4 HOURS PRN
Status: DISCONTINUED | OUTPATIENT
Start: 2017-04-15 | End: 2017-05-01 | Stop reason: HOSPADM

## 2017-04-15 RX ORDER — DEXTROSE MONOHYDRATE 25 G/50ML
25-50 INJECTION, SOLUTION INTRAVENOUS
Status: DISCONTINUED | OUTPATIENT
Start: 2017-04-15 | End: 2017-05-01 | Stop reason: HOSPADM

## 2017-04-15 RX ORDER — SODIUM CHLORIDE 9 MG/ML
INJECTION, SOLUTION INTRAVENOUS CONTINUOUS
Status: DISCONTINUED | OUTPATIENT
Start: 2017-04-15 | End: 2017-04-18

## 2017-04-15 RX ORDER — LIDOCAINE 40 MG/G
CREAM TOPICAL
Status: DISCONTINUED | OUTPATIENT
Start: 2017-04-15 | End: 2017-04-27

## 2017-04-15 RX ADMIN — IPRATROPIUM BROMIDE AND ALBUTEROL SULFATE 3 ML: .5; 3 SOLUTION RESPIRATORY (INHALATION) at 20:33

## 2017-04-15 RX ADMIN — SODIUM CHLORIDE: 9 INJECTION, SOLUTION INTRAVENOUS at 13:03

## 2017-04-15 RX ADMIN — OXYCODONE HYDROCHLORIDE 5 MG: 5 SOLUTION ORAL at 09:25

## 2017-04-15 RX ADMIN — ACETAMINOPHEN 975 MG: 325 TABLET, FILM COATED ORAL at 13:32

## 2017-04-15 RX ADMIN — OXYCODONE HYDROCHLORIDE 5 MG: 5 SOLUTION ORAL at 13:32

## 2017-04-15 RX ADMIN — SODIUM CHLORIDE: 9 INJECTION, SOLUTION INTRAVENOUS at 22:03

## 2017-04-15 RX ADMIN — IPRATROPIUM BROMIDE AND ALBUTEROL SULFATE 3 ML: .5; 3 SOLUTION RESPIRATORY (INHALATION) at 16:34

## 2017-04-15 RX ADMIN — METHOCARBAMOL 500 MG: 500 TABLET ORAL at 17:14

## 2017-04-15 RX ADMIN — PIPERACILLIN SODIUM,TAZOBACTAM SODIUM 3.38 G: 3; .375 INJECTION, POWDER, FOR SOLUTION INTRAVENOUS at 03:51

## 2017-04-15 RX ADMIN — ANTISEPTIC SURGICAL SCRUB: 0.04 SOLUTION TOPICAL at 00:19

## 2017-04-15 RX ADMIN — IPRATROPIUM BROMIDE AND ALBUTEROL SULFATE 3 ML: .5; 3 SOLUTION RESPIRATORY (INHALATION) at 02:59

## 2017-04-15 RX ADMIN — IPRATROPIUM BROMIDE AND ALBUTEROL SULFATE 3 ML: .5; 3 SOLUTION RESPIRATORY (INHALATION) at 12:51

## 2017-04-15 RX ADMIN — HEPARIN SODIUM 1000 UNITS/HR: 10000 INJECTION, SOLUTION INTRAVENOUS at 08:50

## 2017-04-15 RX ADMIN — ACETAMINOPHEN 975 MG: 325 TABLET, FILM COATED ORAL at 05:42

## 2017-04-15 RX ADMIN — ANTISEPTIC SURGICAL SCRUB: 0.04 SOLUTION TOPICAL at 13:03

## 2017-04-15 RX ADMIN — ANTISEPTIC SURGICAL SCRUB: 0.04 SOLUTION TOPICAL at 20:50

## 2017-04-15 RX ADMIN — CHLORHEXIDINE GLUCONATE 15 ML: 1.2 RINSE ORAL at 09:12

## 2017-04-15 RX ADMIN — ACETAMINOPHEN 975 MG: 325 TABLET, FILM COATED ORAL at 21:00

## 2017-04-15 RX ADMIN — SENNOSIDES AND DOCUSATE SODIUM 2 TABLET: 8.6; 5 TABLET ORAL at 20:52

## 2017-04-15 RX ADMIN — HYDROMORPHONE HYDROCHLORIDE: 10 INJECTION, SOLUTION INTRAMUSCULAR; INTRAVENOUS; SUBCUTANEOUS at 13:15

## 2017-04-15 RX ADMIN — LIDOCAINE HYDROCHLORIDE 5 ML: 10 INJECTION, SOLUTION INFILTRATION; PERINEURAL at 15:21

## 2017-04-15 RX ADMIN — OXYCODONE HYDROCHLORIDE 5 MG: 5 SOLUTION ORAL at 20:52

## 2017-04-15 RX ADMIN — PIPERACILLIN SODIUM,TAZOBACTAM SODIUM 3.38 G: 3; .375 INJECTION, POWDER, FOR SOLUTION INTRAVENOUS at 09:11

## 2017-04-15 RX ADMIN — PANTOPRAZOLE SODIUM 40 MG: 40 INJECTION, POWDER, FOR SOLUTION INTRAVENOUS at 08:33

## 2017-04-15 RX ADMIN — SENNOSIDES AND DOCUSATE SODIUM 2 TABLET: 8.6; 5 TABLET ORAL at 09:11

## 2017-04-15 RX ADMIN — IPRATROPIUM BROMIDE AND ALBUTEROL SULFATE 3 ML: .5; 3 SOLUTION RESPIRATORY (INHALATION) at 08:55

## 2017-04-15 RX ADMIN — CHLORHEXIDINE GLUCONATE 15 ML: 1.2 RINSE ORAL at 20:50

## 2017-04-15 RX ADMIN — OXYCODONE HYDROCHLORIDE 5 MG: 5 SOLUTION ORAL at 17:14

## 2017-04-15 ASSESSMENT — VISUAL ACUITY
OU: NORMAL ACUITY;GLASSES

## 2017-04-15 ASSESSMENT — PAIN DESCRIPTION - DESCRIPTORS
DESCRIPTORS: ACHING
DESCRIPTORS: ACHING

## 2017-04-15 NOTE — PROGRESS NOTES
Called by nursing due to concern for audible wheezing. RN reports pt being moved from chair to bed when noted to have wheezing present.     Patient examined.  appears comfortable. On 1L supplemental oxygen, breathing comfortably and able to speak in full sentences.   Bilateral lung sounds, clears clear on examination without wheezes, rhonchi or stridor noted on lung examination   No crepitus on neck examination.  Pharyngostomy tube working well.   Breathing noise resolved with quite breathing.     Pt denies chest pain, shortness of breath, dyspnea or wheezing. Reports his back was painful from laying in bed and was breathing deeper and heavier when told he needed to move back to bed. Offered flonase/saline spray however pt declined as he reports this often occurs when sinuses/nares are congested. Will monitor for now.

## 2017-04-15 NOTE — PLAN OF CARE
Problem: Goal Outcome Summary  Goal: Goal Outcome Summary  Neuro: intact, needs occasional reminders of medications, plan. Up in chair until 1500, SBA to bed for line management.     CV: NSR to ST (highest rate 128) with exertion/pain. SBP WNL, afebrile. Palpable pulses, no edema.     Pulm: audible end inspiratory wheezes with exertion, lungs sounds clear/diminished. No crepitus noted. Bilateral chest tubes to water seal suction, R output >L,  pharyngosomy tube intact.     GI: NPO, ice chips to moisten mouth, spitting out in cup. meds via J tube, clamped 1 hour after, gravity drainage otherwise. Hypoactive bowel sounds.     : galeas minimal adequate output, MDs aware     Skin: intact.      Wife, daughter visited, updated by RN and MDs.

## 2017-04-15 NOTE — PROGRESS NOTES
Thoracic Surgery Progress Note  Jim Braden  [id number]    Subjective  Some SOB overnight. Since resolved.  In good spirits this AM.    Objective  Temp:  [97.5  F (36.4  C)-99.1  F (37.3  C)] 98.3  F (36.8  C)  Heart Rate:  [] 105  Resp:  [5-25] 15  BP: (109-144)/(66-92) 109/66  MAP:  [75 mmHg-144 mmHg] 75 mmHg  Arterial Line BP: ()/() 76/75  SpO2:  [92 %-100 %] 98 %    Physical Exam:  Gen: Awake, alert, AND  CVS: Sinus tachycardia  Resp: NLB on 4L NC, CT serosang, no air leak appreciated  Abd: Soft, appropriately TTP, incisions c/d/i  Extrem: WWP    UOP 1.2/365  Pharyngostomy 350    R CT 80/230  L CT 40/0    Cr bump to 1.34, normal preop  Hgb stable    XR looks clear, conduit looks decompressed, read pending    Assessment & Plan  56 year old male with history of LE DVT on lovenox, neoadjuvant chemo, s/p thoracoscopic/laparoscpic esophagectomy with cervical anastomosis, J tube, pharyngostomy tube placement 4/14 for lower esophageal adenocarcinoma.    Neuro: Scheduled tylenol, dilaudid PCA  Resp: Prn NC, aggressive pulm toilet  Cardio: Holding home lisinopril with MECCA  GI/FEN: NPO, no feeds, okay to use J for meds, pharyngostomy cares/flushes ordered, bowel reg  Renal: Non-oliguric MECCA, follow  Endo: No home meds  ID: Periop zosyn completed  PPx: LIH gtt, protonix  Dispo: Possibly 6b later today    Seen and discussed with fellow, Dr. Luna.    Isidoro Perez MD  General Surgery PGY-3  [phone]

## 2017-04-15 NOTE — PLAN OF CARE
Problem: Goal Outcome Summary  Goal: Goal Outcome Summary  Outcome: Improving  Neuro: Alert and oriented. Pupils equal and reactive. No numbness or tingling. Afebrile overnight.  CV: NSR @ 80-90s when in pain or -130s HR. BP stable 120 SBP overnight unless in pain then 130s SBP.  Resp: 4 L NC, capnography on. IPI 8-9. End tidal volume 44-48. Pt reported SOB with wheezing around 0200. MD notified, given duo-neb and added humidification to O2. Pt up in chair, currently reporting this is helping him breath and reliving SOB. Acapella and IS @ bedside, pt uses both devices independently. Right CT to water seal, draining bright red. Left CT to water seal, CT not draining, MD aware. No new orders.   GI: J-tube, okay to use for meds. Clamped. Hypoactive BS.  : Florentino in place about 75cc/hr.   Pain: Dilaudid PCA. Icy Hot patch on mid upper back. Pt complains of back pain and soreness.  IV: A-line pulled, not reading, not drawing. MD verbalized order.  Left PIV. Right PIV  Plan: Continue to monitor resp. Status closely. Encourage pt to use IS and acapella more

## 2017-04-15 NOTE — PROGRESS NOTES
SURGICAL ICU PROGRESS NOTE  April 15, 2017      PRIMARY TEAM: Thoracic Surgery  PRIMARY PHYSICIAN: Dr. Mendez     REASON FOR CRITICAL CARE ADMISSION: S/p Esophagectomy for esophageal adenocarcinoma      ADMITTING PHYSICIAN: FROY Carrington MD     ASSESSMENT: 56 year old M (BMI 25) with PMH of GERD, HTN, LE DVT on therapeautic lovenox, Alpha 1 antitrypsin deficiency, Former Smoker, and Distal Esophageal Adenocarcinoma- Stage IIIC (uT3N3) s/p neoadjuvant FOLFOX chemotherapy, now s/p EGD, Laparoscopic Distal Esophagectomy with Gastric conduit and primary anastomosis, Pharyngostomy tube placement, jejunostomy, and bilateral chest tube placement, admitted to the SICU for post operative management.     PLAN:  Neuro/ pain/ sedation:  #Acute on Chronic pain  #Hx of EtOH use, former significant, now states 1-2 beer per day  - Continue hydromorphone PCA, and scheduled acetaminophen.  - Add scheduled oxycodone q4hrs.    Pulmonary care:   #Respiratory Insufficiency  #b/l chest tube placement  -b/l CT to water seal  -Supplemental oxygen to keep saturation above 92 %.  -Capnography  -ABG this morning acceptable.     Cardiovascular:   #HDS  - Monitor hemodynamic status. Stable.    GI care:   # Distal Esophageal Adenocarcinoma- Stage IIIC (uT3N3) s/p neoadjuvant FOLFOX chemotherapy  # s/p neoadjuvant FOLFOX chemotherapy, now s/p EGD,  Laparoscopic Distal Esophagectomy with Gastric conduit and primary anastomosis, J tube placement, Pharyngostomy tube placement, and bilateral chest tube placement  # h/o GERD  - NPO   - Pharyngostomy to LIS, flushes [id number]  - J tube- to gravity. Meds ok per J but no feeds today.   - No feeds    Fluids/ Electrolytes/ Nutrition:   - LR @ 50 cc for IV fluid hydration  - ICU electrolyte replacement protocol  - Nutrition consulted. Appreciate recs    Renal/ Fluid Balance:   - Will monitor intake and output.  - Cr bump today. Will continue to monitor.  - Mishel for Strict I/O.     Endocrine:   - SSI  - q4h  FSBG while NPO     ID/ Antibiotics:  #Perioperative Infection Prophylaxis  - Periop Fluconazole, Zosyn     Heme:   #HDS  - Hgb stable at 12.4  - Start low intensity heparin drip today.      Prophylaxis:    #H/o GERD  #H/o LE DVT on therapeautic lovenox as outpatient  - Mechanical prophylaxis for DVT.   - IV PPI     MSK:   - PT and OT consulted. Appreciate recs.     Lines/ tubes/ drains:  - R CT, L CT, Pharyngostomy Tube, J tube, PIV, A line     Disposition:  - Surgical ICU. May be able to transfer to floor today     Patient seen, findings and plan discussed with surgical ICU staff.     Crispin Alanis, PGY2  [phone]   ====================================    TODAY'S SUBJECTIVE/INTERVAL HISTORY:   Reports some difficulty taking deep breaths. Sat in chair. Using IS.     OBJECTIVE:     Temp:  [97.5  F (36.4  C)-99.1  F (37.3  C)] 97.8  F (36.6  C)  Heart Rate:  [] 96  Resp:  [5-25] 13  BP: (106-144)/(66-92) 106/70  MAP:  [75 mmHg-144 mmHg] 75 mmHg  Arterial Line BP: ()/() 76/75  SpO2:  [92 %-100 %] 93 %  Resp: 13    I/O last 3 completed shifts:  In: 3619.17 [I.V.:2999.17; NG/GT:120]  Out: 2580 [Urine:1480; Drains:350; Blood:400; Chest Tube:350]    General appearance: in NAD  HEENT: pharyngostomy to suction  Neuro: A & O x 3; follows commands, moving all extremities spontaneously   Pulm: Non-labored breathing, CTs in place to water seal.   Abd: soft; NT, ND  Extremities: no edema  Skin: warm and well-perfused    LABS:   Arterial Blood Gases     Recent Labs  Lab 04/15/17  0534 04/14/17  1812 04/14/17  1700 04/14/17  1530   PH 7.33* 7.30* 7.24* 7.28*   PCO2 40 41 45 41   PO2 93 142* 115* 126*   HCO3 21 20* 20* 19*     Complete Blood Count     Recent Labs  Lab 04/15/17  0700 04/14/17  2213 04/14/17  1725 04/14/17  1700 04/14/17  1530   WBC 8.2  --  8.5  --   --    HGB 12.4*  --  12.1* 11.9* 12.2*    144* 168  --   --      Basic Metabolic Panel    Recent Labs  Lab 04/15/17  0700 04/14/17  1725  04/14/17  1700 04/14/17  1530    142 142 141   POTASSIUM 4.9 4.8 5.0 4.7   CHLORIDE 110* 111*  --   --    CO2 21 19*  --   --    BUN 25 21  --   --    CR 1.34* 1.07  --   --    * 150* 127* 138*     Liver Function Tests    Recent Labs  Lab 04/14/17  1725   *   *   ALKPHOS 60   BILITOTAL 0.9   ALBUMIN 3.8   INR 1.10     Pancreatic Enzymes  No lab results found in last 7 days.  Coagulation Profile    Recent Labs  Lab 04/14/17  1725   INR 1.10         IMAGING:   Recent Results (from the past 24 hour(s))   XR Chest Port 1 View    Narrative    EXAM: XR CHEST PORT 1 VW  4/14/2017 6:07 PM      HISTORY: Post Op thoracic surgery    COMPARISON: None    FINDINGS: New postsurgical changes of esophagectomy with cervical  anastomosis. Pharyngostomy tube tip projects over the left upper  quadrant. Scattered surgical clips over the central thorax and  gastroesophageal region. Single right-sided and single left-sided  chest tubes. Right IJ Port-A-Cath tip projects over the superior  cavoatrial junction.    The cardiac silhouette is within normal limits. Trace left pleural  effusion. Subcutaneous emphysema along the lateral left hemithorax. No  definite pneumothorax. Mild streaky right perihilar opacities.       Impression    IMPRESSION:  1. New postsurgical changes of esophagectomy. Lines and tubes as  above.    2. Patchy left retrocardiac and right perihilar opacities of  atelectasis versus consolidation.      I have personally reviewed the examination and initial interpretation  and I agree with the findings.    SB QUINTERO MD

## 2017-04-16 ENCOUNTER — APPOINTMENT (OUTPATIENT)
Dept: GENERAL RADIOLOGY | Facility: CLINIC | Age: 57
DRG: 357 | End: 2017-04-16
Attending: THORACIC SURGERY (CARDIOTHORACIC VASCULAR SURGERY)
Payer: COMMERCIAL

## 2017-04-16 ENCOUNTER — APPOINTMENT (OUTPATIENT)
Dept: GENERAL RADIOLOGY | Facility: CLINIC | Age: 57
DRG: 357 | End: 2017-04-16
Attending: PHYSICIAN ASSISTANT
Payer: COMMERCIAL

## 2017-04-16 ENCOUNTER — APPOINTMENT (OUTPATIENT)
Dept: PHYSICAL THERAPY | Facility: CLINIC | Age: 57
DRG: 357 | End: 2017-04-16
Attending: THORACIC SURGERY (CARDIOTHORACIC VASCULAR SURGERY)
Payer: COMMERCIAL

## 2017-04-16 LAB
ANION GAP SERPL CALCULATED.3IONS-SCNC: 8 MMOL/L (ref 3–14)
BUN SERPL-MCNC: 24 MG/DL (ref 7–30)
CALCIUM SERPL-MCNC: 8.6 MG/DL (ref 8.5–10.1)
CHLORIDE SERPL-SCNC: 110 MMOL/L (ref 94–109)
CO2 SERPL-SCNC: 24 MMOL/L (ref 20–32)
CREAT SERPL-MCNC: 0.96 MG/DL (ref 0.66–1.25)
GFR SERPL CREATININE-BSD FRML MDRD: 81 ML/MIN/1.7M2
GLUCOSE BLDC GLUCOMTR-MCNC: 101 MG/DL (ref 70–99)
GLUCOSE BLDC GLUCOMTR-MCNC: 110 MG/DL (ref 70–99)
GLUCOSE BLDC GLUCOMTR-MCNC: 123 MG/DL (ref 70–99)
GLUCOSE BLDC GLUCOMTR-MCNC: 133 MG/DL (ref 70–99)
GLUCOSE SERPL-MCNC: 130 MG/DL (ref 70–99)
LMWH PPP CHRO-ACNC: 0.14 IU/ML
LMWH PPP CHRO-ACNC: 0.28 IU/ML
MAGNESIUM SERPL-MCNC: 2.3 MG/DL (ref 1.6–2.3)
POTASSIUM BLD-SCNC: 4.4 MMOL/L (ref 3.4–5.3)
POTASSIUM SERPL-SCNC: 4.4 MMOL/L (ref 3.4–5.3)
SODIUM SERPL-SCNC: 142 MMOL/L (ref 133–144)

## 2017-04-16 PROCEDURE — 25000132 ZZH RX MED GY IP 250 OP 250 PS 637: Performed by: PHYSICIAN ASSISTANT

## 2017-04-16 PROCEDURE — 25000125 ZZHC RX 250: Performed by: SURGERY

## 2017-04-16 PROCEDURE — 85520 HEPARIN ASSAY: CPT | Performed by: THORACIC SURGERY (CARDIOTHORACIC VASCULAR SURGERY)

## 2017-04-16 PROCEDURE — 25000132 ZZH RX MED GY IP 250 OP 250 PS 637: Performed by: SURGERY

## 2017-04-16 PROCEDURE — 40000193 ZZH STATISTIC PT WARD VISIT: Performed by: PHYSICAL THERAPIST

## 2017-04-16 PROCEDURE — 71010 XR CHEST PORT 1 VW: CPT

## 2017-04-16 PROCEDURE — 80048 BASIC METABOLIC PNL TOTAL CA: CPT | Performed by: INTERNAL MEDICINE

## 2017-04-16 PROCEDURE — 25000128 H RX IP 250 OP 636: Performed by: SURGERY

## 2017-04-16 PROCEDURE — 71010 XR CHEST PORT 1 VW: CPT | Mod: 77

## 2017-04-16 PROCEDURE — 00000146 ZZHCL STATISTIC GLUCOSE BY METER IP

## 2017-04-16 PROCEDURE — 85520 HEPARIN ASSAY: CPT | Performed by: INTERNAL MEDICINE

## 2017-04-16 PROCEDURE — 25000128 H RX IP 250 OP 636: Performed by: STUDENT IN AN ORGANIZED HEALTH CARE EDUCATION/TRAINING PROGRAM

## 2017-04-16 PROCEDURE — 94640 AIRWAY INHALATION TREATMENT: CPT | Mod: 76

## 2017-04-16 PROCEDURE — 97530 THERAPEUTIC ACTIVITIES: CPT | Mod: GP | Performed by: PHYSICAL THERAPIST

## 2017-04-16 PROCEDURE — 84132 ASSAY OF SERUM POTASSIUM: CPT | Performed by: SURGERY

## 2017-04-16 PROCEDURE — 25000132 ZZH RX MED GY IP 250 OP 250 PS 637: Performed by: THORACIC SURGERY (CARDIOTHORACIC VASCULAR SURGERY)

## 2017-04-16 PROCEDURE — 83735 ASSAY OF MAGNESIUM: CPT | Performed by: INTERNAL MEDICINE

## 2017-04-16 PROCEDURE — 25000128 H RX IP 250 OP 636: Performed by: PHYSICIAN ASSISTANT

## 2017-04-16 PROCEDURE — 12000006 ZZH R&B IMCU INTERMEDIATE UMMC

## 2017-04-16 PROCEDURE — 36592 COLLECT BLOOD FROM PICC: CPT | Performed by: THORACIC SURGERY (CARDIOTHORACIC VASCULAR SURGERY)

## 2017-04-16 PROCEDURE — 97116 GAIT TRAINING THERAPY: CPT | Mod: GP | Performed by: PHYSICAL THERAPIST

## 2017-04-16 PROCEDURE — 40000275 ZZH STATISTIC RCP TIME EA 10 MIN

## 2017-04-16 PROCEDURE — 97161 PT EVAL LOW COMPLEX 20 MIN: CPT | Mod: GP | Performed by: PHYSICAL THERAPIST

## 2017-04-16 RX ORDER — POLYETHYLENE GLYCOL 3350 17 G/17G
17 POWDER, FOR SOLUTION ORAL DAILY
Status: DISCONTINUED | OUTPATIENT
Start: 2017-04-16 | End: 2017-05-01 | Stop reason: HOSPADM

## 2017-04-16 RX ORDER — HYDROMORPHONE HCL/0.9% NACL/PF 0.2MG/0.2
0.2 SYRINGE (ML) INTRAVENOUS
Status: DISCONTINUED | OUTPATIENT
Start: 2017-04-16 | End: 2017-04-16

## 2017-04-16 RX ORDER — BISACODYL 10 MG
10 SUPPOSITORY, RECTAL RECTAL DAILY PRN
Status: DISCONTINUED | OUTPATIENT
Start: 2017-04-16 | End: 2017-05-01 | Stop reason: HOSPADM

## 2017-04-16 RX ORDER — HYDROMORPHONE HCL/0.9% NACL/PF 0.2MG/0.2
0.2 SYRINGE (ML) INTRAVENOUS
Status: DISCONTINUED | OUTPATIENT
Start: 2017-04-16 | End: 2017-05-01 | Stop reason: HOSPADM

## 2017-04-16 RX ORDER — OXYCODONE HCL 5 MG/5 ML
5 SOLUTION, ORAL ORAL EVERY 4 HOURS PRN
Status: DISCONTINUED | OUTPATIENT
Start: 2017-04-16 | End: 2017-05-01 | Stop reason: HOSPADM

## 2017-04-16 RX ORDER — OXYCODONE HYDROCHLORIDE 5 MG/1
5 TABLET ORAL EVERY 4 HOURS PRN
Status: DISCONTINUED | OUTPATIENT
Start: 2017-04-16 | End: 2017-04-16

## 2017-04-16 RX ADMIN — CHLORHEXIDINE GLUCONATE 15 ML: 1.2 RINSE ORAL at 07:39

## 2017-04-16 RX ADMIN — SENNOSIDES AND DOCUSATE SODIUM 2 TABLET: 8.6; 5 TABLET ORAL at 07:43

## 2017-04-16 RX ADMIN — OXYCODONE HYDROCHLORIDE 5 MG: 5 SOLUTION ORAL at 20:32

## 2017-04-16 RX ADMIN — CHLORHEXIDINE GLUCONATE 15 ML: 1.2 RINSE ORAL at 20:32

## 2017-04-16 RX ADMIN — OXYCODONE HYDROCHLORIDE 5 MG: 5 SOLUTION ORAL at 16:41

## 2017-04-16 RX ADMIN — OXYCODONE HYDROCHLORIDE 5 MG: 5 SOLUTION ORAL at 07:39

## 2017-04-16 RX ADMIN — HYDROMORPHONE HYDROCHLORIDE 0.2 MG: 10 INJECTION, SOLUTION INTRAMUSCULAR; INTRAVENOUS; SUBCUTANEOUS at 15:12

## 2017-04-16 RX ADMIN — ANTISEPTIC SURGICAL SCRUB: 0.04 SOLUTION TOPICAL at 20:32

## 2017-04-16 RX ADMIN — METOPROLOL TARTRATE 12.5 MG: 100 TABLET ORAL at 20:32

## 2017-04-16 RX ADMIN — SENNOSIDES A AND B 10 ML: 415.36 LIQUID ORAL at 20:32

## 2017-04-16 RX ADMIN — METOPROLOL TARTRATE 12.5 MG: 100 TABLET ORAL at 09:03

## 2017-04-16 RX ADMIN — SODIUM CHLORIDE: 9 INJECTION, SOLUTION INTRAVENOUS at 08:15

## 2017-04-16 RX ADMIN — OXYCODONE HYDROCHLORIDE 5 MG: 5 TABLET ORAL at 12:28

## 2017-04-16 RX ADMIN — ANTISEPTIC SURGICAL SCRUB: 0.04 SOLUTION TOPICAL at 08:04

## 2017-04-16 RX ADMIN — ACETAMINOPHEN 975 MG: 325 TABLET, FILM COATED ORAL at 05:28

## 2017-04-16 RX ADMIN — IPRATROPIUM BROMIDE AND ALBUTEROL SULFATE 3 ML: .5; 3 SOLUTION RESPIRATORY (INHALATION) at 20:18

## 2017-04-16 RX ADMIN — HEPARIN SODIUM 1000 UNITS/HR: 10000 INJECTION, SOLUTION INTRAVENOUS at 05:14

## 2017-04-16 RX ADMIN — METHOCARBAMOL 500 MG: 500 TABLET ORAL at 12:28

## 2017-04-16 RX ADMIN — PANTOPRAZOLE SODIUM 40 MG: 40 INJECTION, POWDER, FOR SOLUTION INTRAVENOUS at 07:39

## 2017-04-16 RX ADMIN — OXYCODONE HYDROCHLORIDE 5 MG: 5 SOLUTION ORAL at 05:20

## 2017-04-16 RX ADMIN — IPRATROPIUM BROMIDE AND ALBUTEROL SULFATE 3 ML: .5; 3 SOLUTION RESPIRATORY (INHALATION) at 16:57

## 2017-04-16 RX ADMIN — ACETAMINOPHEN 975 MG: 325 SOLUTION ORAL at 16:41

## 2017-04-16 RX ADMIN — OXYCODONE HYDROCHLORIDE 5 MG: 5 SOLUTION ORAL at 00:04

## 2017-04-16 ASSESSMENT — PAIN DESCRIPTION - DESCRIPTORS: DESCRIPTORS: ACHING

## 2017-04-16 ASSESSMENT — VISUAL ACUITY
OU: NORMAL ACUITY;GLASSES
OU: NORMAL ACUITY;GLASSES

## 2017-04-16 NOTE — PROGRESS NOTES
Thoracic Surgery Progress Note  Jim Braden  [id number]    Subjective  No acute events overnight. Patient states he is feeling well. Pain controlled. Up to chair.      Objective  Temp:  [97.7  F (36.5  C)-99.9  F (37.7  C)] 98.5  F (36.9  C)  Heart Rate:  [] 100  Resp:  [7-24] 24  BP: (106-144)/(69-88) 117/75  SpO2:  [90 %-99 %] 97 %    Physical Exam:  Gen: Awake, alert, AND  CVS: RRR  Resp: NLB on 2L NC, CT serosang, no air leak appreciated  Abd: Soft, appropriately TTP, incisions c/d/i  Extrem: WWP      Intake/Output Summary (Last 24 hours) at 04/16/17 0849  Last data filed at 04/16/17 0800   Gross per 24 hour   Intake          2716.67 ml   Output             1535 ml   Net          1181.67 ml     UOP: 970  CT R: 540 (80)  CT L: 10 (40)      Recent Labs  Lab 04/16/17  0323      POTASSIUM 4.4  4.4   CHLORIDE 110*   DANUTA 8.6   CO2 24   BUN 24   CR 0.96   *         Recent Labs  Lab 04/15/17  0700   WBC 8.2   HGB 12.4*          Imaging:  EXAM: XR CHEST PORT 1 VW 4/16/2017 3:30 AM   IMPRESSION:   1. Improving perihilar and left retrocardiac opacities. Lines and  tubes as above.  2. New subcutaneous emphysema in the neck since 4/15/2017, uncertain  clinical significance.      Assessment & Plan  56 year old male with history of LE DVT on lovenox, neoadjuvant chemo, s/p thoracoscopic/laparoscpic esophagectomy with cervical anastomosis, J tube, pharyngostomy tube placement 4/14 for lower esophageal adenocarcinoma.     Neuro: Scheduled tylenol, dilaudid PCA  Resp: Prn NC, aggressive pulm toilet  - removed L CT this morning; f/u CXR at noon  Cardio: Holding home lisinopril with MECCA  - metoprolol 12.5 mg BID  GI/FEN: NPO, no feeds, okay to use J for meds, pharyngostomy cares/flushes ordered, bowel reg  Renal: Non-oliguric MECCA, follow  - d/c Florentino  Endo: No home meds  ID: Periop zosyn completed  PPx: [facility] gtt, protonix    Dispo: 6b later today if bed available     Seen and discussed with fellow,   Cheryl.      - - - - - - - - - - - -  Pastor Loco MD  PGY-1, General Surgery  [facility]  Pager: [phone]

## 2017-04-16 NOTE — PLAN OF CARE
Problem: Goal Outcome Summary  Goal: Goal Outcome Summary  Outcome: Improving  Pt alert and oriented when awakened.  CT, Pharyngostomy, J tube all in place unchanged.  Wife at the bedside.

## 2017-04-16 NOTE — PLAN OF CARE
Problem: Goal Outcome Summary  Goal: Goal Outcome Summary  PT 4A: Evaluation completed, treatment initiated. Patient transferred OOB with SBA.  Ambulated 140' without AD and min assist. Mild gait deviations due to fatigue and numerous lines/tubes/drains.  Patient highly motivated to perform OOB activity.     Anticipate patient able to discharge to home setting with assistance when stable.

## 2017-04-16 NOTE — PROGRESS NOTES
SURGICAL ICU PROGRESS NOTE  April 16, 2017      PRIMARY TEAM: Thoracic Surgery  PRIMARY PHYSICIAN: Dr. Mendez     REASON FOR CRITICAL CARE ADMISSION: S/p Esophagectomy for esophageal adenocarcinoma      ADMITTING PHYSICIAN: FROY Carrington MD     ASSESSMENT: 56 year old M (BMI 25) with PMH of GERD, HTN, LE DVT on therapeautic lovenox, Alpha 1 antitrypsin deficiency, Former Smoker, and Distal Esophageal Adenocarcinoma- Stage IIIC (uT3N3) s/p neoadjuvant FOLFOX chemotherapy, now s/p EGD, Laparoscopic Distal Esophagectomy with Gastric conduit and primary anastomosis, Pharyngostomy tube placement, jejunostomy, and bilateral chest tube placement, admitted to the SICU for post operative management.     Today's interval changes:  -D/c PCA and change to scheduled oxycodone  -oxycodone 5mg prn q4h  -discussed tube feeds with primary team--they wish to hold off on feeds at this time.   -CXR to follow up tube feeds at 1200  -Monitor UOP/discuss with primary    PLAN:  Neuro/ pain/ sedation:  #Acute on Chronic pain  #Hx of EtOH use, former significant, now states 1-2 beer per day  - no signs of alcohols withdrawal, monitor and trend.   - pain regimen:  scheduled oxycodone q4hrs, tylenol, with PRN IVP dilaudid and robaxin at bedtime.     Pulmonary care:   #Respiratory Insufficiency  #b/l chest tube placement  -Supplemental oxygen to keep saturation above 92 %  -scheduled nebs. Continue with aggressive pulmonary toileting.   -Capnography  - chest tube management per thoracic's team. CXR this afternoon after left  CT removal      Cardiovascular:   #HDS  # tachycardia   - Monitor hemodynamic status.   - start metoprolol 2.5 mg BID per thoracic   - Stable, no pressor needs.     GI care:   # Distal Esophageal Adenocarcinoma- Stage IIIC (uT3N3) s/p neoadjuvant FOLFOX chemotherapy  # s/p neoadjuvant FOLFOX chemotherapy, now s/p EGD,  Laparoscopic Distal Esophagectomy with Gastric conduit and primary anastomosis, J tube placement,  Pharyngostomy tube placement, and bilateral chest tube placement  # h/o GERD  #constipation  - NPO, No feeds  - Pharyngostomy to LIS, flushes [id number]  - J tube- to gravity. Meds ok per J but no feeds today.   - no BM since PTA. Increase bowel medication, suppository PRN.     Fluids/ Electrolytes/ Nutrition:   - NS @ 100 cc for IV fluid hydration  - ICU electrolyte replacement protocol  - Nutrition consulted. Appreciate recs for when feeds can be started.     Renal/ Fluid Balance:   - Will monitor intake and output.  - Cr bump today. Will continue to monitor.  - Mishel for Strict I/O.     Endocrine:   - SSI for glucose control. Goal to keep BG < 180 for optimal healing.   - q4h FSBG while NPO     ID/ Antibiotics:  #Perioperative Infection Prophylaxis  - Periop Fluconazole, Zosyn     Heme:   #H/o LE DVT on therapeautic lovenox as outpatient  - Mechanical prophylaxis for DVT in addition to heparin drip   - IV PPI      MSK:   #weakness and deconditioning of critical illness   - PT and OT consulted. Appreciate recs.    General Prophylaxis:    - Heparin drip   - Protonix         Lines/ tubes/ drains:  - R CT, L CT, Pharyngostomy Tube, J tube, PIV,     Disposition:  - transfer to floor today under thoracic surgery.      Patient seen, findings and plan discussed with surgical ICU staff, Dr Hernandez     Time spent on this encounter to be used for billin minutes for patient visit, care coordination for care of patient.     ====================================    TODAY'S SUBJECTIVE/INTERVAL HISTORY:  Course reviewed. No acute events overnight. Reports sleeping fairly well. Pt reports typically goes to be around 8pm and wakes around 3am per home schedule. Reports pain is better controlled today, does not like PCA as he found dilaudid to be too sedating. Prefers to be up in recliner chair, would like walk more today. Denies fever, chills, sweat, dyspnea or shortness of breath.     OBJECTIVE:     Temp:  [97.7  F (36.5  C)-99.9   F (37.7  C)] 98.5  F (36.9  C)  Heart Rate:  [] 100  Resp:  [7-24] 24  BP: (106-144)/(70-88) 117/75  SpO2:  [90 %-98 %] 97 %  Resp: 24    I/O last 3 completed shifts:  In: 2716.67 [I.V.:2336.67; NG/GT:380]  Out: 1655 [Urine:925; Drains:260; Chest Tube:470]    General: sitting up in chair, NAD. Pleasant, interactive   HEENT:  Pupils 3mm and equal, OP clear, lips and tongue appear mildly dry, pharyngostomy to suction in left neck.   Neuro: A & O x 3; follows commands, moving all extremities spontaneously.   Pulm; BBS, decreased at bases, no wheezes, rales or rhonchi. Right chest tube with darker pink tinged drainage--thin and clear-- water seal. , Left chest tube removed and dressing DCI.   Abd: very mild TTP aroundJ tube present, site clear and dry, no redness, warmth or tenderness,   Extremities: trace edema. CHRISTINA. Calves soft and non-tender, PP 2+.   Skin: warm and well-perfused, no rashes, lesion or ecchymosis.     LABS:   Arterial Blood Gases     Recent Labs  Lab 04/15/17  0534 04/14/17  1812 04/14/17  1700 04/14/17  1530   PH 7.33* 7.30* 7.24* 7.28*   PCO2 40 41 45 41   PO2 93 142* 115* 126*   HCO3 21 20* 20* 19*     Complete Blood Count     Recent Labs  Lab 04/15/17  0700 04/14/17  2213 04/14/17  1725 04/14/17  1700 04/14/17  1530   WBC 8.2  --  8.5  --   --    HGB 12.4*  --  12.1* 11.9* 12.2*    144* 168  --   --      Basic Metabolic Panel    Recent Labs  Lab 04/16/17  0323 04/15/17  1525 04/15/17  0700 04/14/17  1725    141 141 142   POTASSIUM 4.4  4.4 4.2 4.9 4.8   CHLORIDE 110* 109 110* 111*   CO2 24 26 21 19*   BUN 24 26 25 21   CR 0.96 1.13 1.34* 1.07   * 144* 167* 150*     Liver Function Tests    Recent Labs  Lab 04/14/17  1725   *   *   ALKPHOS 60   BILITOTAL 0.9   ALBUMIN 3.8   INR 1.10     Pancreatic Enzymes  No lab results found in last 7 days.  Coagulation Profile    Recent Labs  Lab 04/14/17  1725   INR 1.10         IMAGING:   Recent Results (from the past  24 hour(s))   XR Chest Port 1 View    Narrative    XR CHEST PORT 1 VW 4/15/2017 3:33 PM    History: RN placed PICC - verify tip placement    Comparison: 4/15/2017    FINDINGS: Redemonstrated changes of esophagectomy with cervical  anastomosis. Pharyngostomy tube tip projects over the midline.  Scattered surgical clips over the central thorax and gastroesophageal  region. Single right-sided and single left-sided chest tubes. Right IJ  Port-A-Cath tip projects over the superior cavoatrial junction.    The cardiac silhouette is within normal limits. Trace bilateral  pleural effusions. Bilateral subcutaneous emphysema. No definite  pneumothorax.  Decreased right perihilar opacities. Retrocardiac  opacities are unchanged. New left upper extremity PICC tip projects  over the low SVC.      Impression    Impression:   1. New left upper extremity PICC tip projects over the low SVC.  2. Mildly improving right perihilar opacities. No significant change  in retrocardiac opacities.    I have personally reviewed the examination and initial interpretation  and I agree with the findings.    SB QUINTERO MD   XR Chest Port 1 View    Narrative    EXAM: XR CHEST PORT 1 VW  4/16/2017 3:30 AM      HISTORY: interval change    COMPARISON: Prior day    FINDINGS: Pharyngostomy tube in stable position. Left upper extremity  PICC tip projects over the low SVC. Right IJ Port-A-Cath tip projects  over the lower SVC. Scattered mediastinal upper abdominal surgical  clips. Right-sided chest tube in similar position. Left-sided chest  tube in stable position. Continued improvement in perihilar and left  retrocardiac opacities. No definite pneumothorax.       Impression    IMPRESSION:   1. Improving perihilar and left retrocardiac opacities. Lines and  tubes as above.  2. New subcutaneous emphysema in the neck since 4/15/2017, uncertain  clinical significance.    I have personally reviewed the examination and initial interpretation  and I  agree with the findings.    MAXWELL SHAW         Physician Attestation   I, Sudheer Hernandez, have reviewed and discussed with the advanced practice provider their history, physical and plan for Jim Braden. I did not participate in a shared visit by interviewing or examining the patient and this should be billed as an advanced practice provider only visit.    Sudheer Hernandez  Date of Service (when I saw the patient): I did not personally see this patient today.

## 2017-04-16 NOTE — PLAN OF CARE
Problem: Goal Outcome Summary  Goal: Goal Outcome Summary  Outcome: Improving  S:  Pt transferred to 6B at approx 1130  B:  Pt transferred to 6B via wheelchair on monitor and 02 w/ RN.  Full report given to accepting RN including need to draw hep 10a upon arrival.  PCA dilaudid DC'd---pt remains on sched oxy and tylenol w/ adequate relief so far.       Ambulated in [place] w/ PT---see their assessmernt.      When 02 off pt desats to high 80s so remains on 2 liter per np.  Able to use IS up to 1500.  Pt states he has been rinsing mouth with ice water and then spitting it out.  Instructed that he must be strictly NPO until MD says otherwise.  Left CT DC'd by md at 0830----so f/u cxr yet.  No chg in resp assmt.        See critical care flow sheet for remainder of assessment.  A:  As noted.  R:    Cont w/ care plan

## 2017-04-16 NOTE — PROGRESS NOTES
Transfer  Transferred from: 4A  Via: wheelchair  Reason for transfer: Pt appropriate for 6B- improved patient condition  Family: Aware of transfer at the bedside  Belongings: Received with pt  Chart: Received with pt  Medications: Meds received from old unit with pt  2 RN Skin Assessment Completed By: Apoorva PULIDO  Report received from: Alyson PULIDO  Pt status:  Pt reporting discomfort with all the movement.  Scheduled pain meds given.

## 2017-04-16 NOTE — PLAN OF CARE
Problem: Goal Outcome Summary  Goal: Goal Outcome Summary  Outcome: Improving  Pt s/p EGD, Laparoscopic Distal Esophagectomy with Gastric conduit and primary anastomosis, Pharyngostomy tube placement, jejunostomy, and bilateral chest tube placement, admitted to the SICU for post operative management. A+O x4. Neuros intact. Low grade temp 99F. BP stable. Sinus tachycardic. Hep Xa level 0.14. Heparin bolus given and drip rate increased from 1000 to 1150 units/hr. Next hep Xa recheck @ 1130. Sating mid to upper 90's on 2L O2 via NC. Experiences some dyspnea on exertion. Has a cough productive of  moderate amount of thick, creamy-brown sputum. LS- clear on the upper and diminished on the bases. Utilizing acapella and IS appropriately. Bilateral chest tubes patent. NPO. Bowel sounds hypoactive. J tube to gravity drainage. Pharyngotomy tube to LIS. No stool overnight. Florentino output 20-30 cc/hr. NS @ 100 cc/hr. Back pain is moderately controlled with PCA dilaudid and scheduled tylenol + oxycodone. Up to the chair with assist of 2 due to multiple lines and  Tubes. Abdominal and chest incisions WDL. Plan: see flow sheet for detailed assessments and interventions, continue to support POC.

## 2017-04-16 NOTE — PLAN OF CARE
Problem: Goal Outcome Summary  Goal: Goal Outcome Summary  OT 4A: OT orders received. Per discussion with PT, pt moving well post-op and anticipate no skilled OT needs in acute setting. Plan to hold OT services pending progress and improvement with fatigue.

## 2017-04-17 ENCOUNTER — APPOINTMENT (OUTPATIENT)
Dept: PHYSICAL THERAPY | Facility: CLINIC | Age: 57
DRG: 357 | End: 2017-04-17
Attending: THORACIC SURGERY (CARDIOTHORACIC VASCULAR SURGERY)
Payer: COMMERCIAL

## 2017-04-17 ENCOUNTER — APPOINTMENT (OUTPATIENT)
Dept: GENERAL RADIOLOGY | Facility: CLINIC | Age: 57
DRG: 357 | End: 2017-04-17
Attending: THORACIC SURGERY (CARDIOTHORACIC VASCULAR SURGERY)
Payer: COMMERCIAL

## 2017-04-17 LAB
ANION GAP SERPL CALCULATED.3IONS-SCNC: 8 MMOL/L (ref 3–14)
BASE DEFICIT BLDA-SCNC: 0 MMOL/L
BASE EXCESS BLDA CALC-SCNC: 1.3 MMOL/L
BUN SERPL-MCNC: 17 MG/DL (ref 7–30)
CALCIUM SERPL-MCNC: 8.3 MG/DL (ref 8.5–10.1)
CHLORIDE SERPL-SCNC: 111 MMOL/L (ref 94–109)
CO2 SERPL-SCNC: 25 MMOL/L (ref 20–32)
CREAT SERPL-MCNC: 0.73 MG/DL (ref 0.66–1.25)
CRP SERPL-MCNC: 160 MG/L (ref 0–8)
ERYTHROCYTE [DISTWIDTH] IN BLOOD BY AUTOMATED COUNT: 14.7 % (ref 10–15)
GFR SERPL CREATININE-BSD FRML MDRD: ABNORMAL ML/MIN/1.7M2
GLUCOSE BLDC GLUCOMTR-MCNC: 102 MG/DL (ref 70–99)
GLUCOSE BLDC GLUCOMTR-MCNC: 103 MG/DL (ref 70–99)
GLUCOSE BLDC GLUCOMTR-MCNC: 115 MG/DL (ref 70–99)
GLUCOSE BLDC GLUCOMTR-MCNC: 79 MG/DL (ref 70–99)
GLUCOSE BLDC GLUCOMTR-MCNC: 95 MG/DL (ref 70–99)
GLUCOSE SERPL-MCNC: 112 MG/DL (ref 70–99)
HCO3 BLD-SCNC: 25 MMOL/L (ref 21–28)
HCO3 BLD-SCNC: 26 MMOL/L (ref 21–28)
HCT VFR BLD AUTO: 28.4 % (ref 40–53)
HGB BLD-MCNC: 9.1 G/DL (ref 13.3–17.7)
HGB BLD-MCNC: 9.3 G/DL (ref 13.3–17.7)
HGB BLD-MCNC: 9.5 G/DL (ref 13.3–17.7)
LMWH PPP CHRO-ACNC: 0.14 IU/ML
LMWH PPP CHRO-ACNC: 0.17 IU/ML
MAGNESIUM SERPL-MCNC: 2.1 MG/DL (ref 1.6–2.3)
MCH RBC QN AUTO: 31.2 PG (ref 26.5–33)
MCHC RBC AUTO-ENTMCNC: 32 G/DL (ref 31.5–36.5)
MCV RBC AUTO: 97 FL (ref 78–100)
O2/TOTAL GAS SETTING VFR VENT: ABNORMAL %
O2/TOTAL GAS SETTING VFR VENT: NORMAL %
PCO2 BLD: 38 MM HG (ref 35–45)
PCO2 BLD: 39 MM HG (ref 35–45)
PH BLD: 7.42 PH (ref 7.35–7.45)
PH BLD: 7.42 PH (ref 7.35–7.45)
PHOSPHATE SERPL-MCNC: 1.3 MG/DL (ref 2.5–4.5)
PHOSPHATE SERPL-MCNC: 2 MG/DL (ref 2.5–4.5)
PLATELET # BLD AUTO: 138 10E9/L (ref 150–450)
PLATELET # BLD AUTO: 139 10E9/L (ref 150–450)
PO2 BLD: 62 MM HG (ref 80–105)
PO2 BLD: 83 MM HG (ref 80–105)
POTASSIUM SERPL-SCNC: 3.8 MMOL/L (ref 3.4–5.3)
RADIOLOGIST FLAGS: ABNORMAL
RBC # BLD AUTO: 2.92 10E12/L (ref 4.4–5.9)
SODIUM SERPL-SCNC: 144 MMOL/L (ref 133–144)
WBC # BLD AUTO: 6.1 10E9/L (ref 4–11)
WBC # BLD AUTO: 6.6 10E9/L (ref 4–11)

## 2017-04-17 PROCEDURE — 36592 COLLECT BLOOD FROM PICC: CPT | Performed by: THORACIC SURGERY (CARDIOTHORACIC VASCULAR SURGERY)

## 2017-04-17 PROCEDURE — 85018 HEMOGLOBIN: CPT | Performed by: SURGERY

## 2017-04-17 PROCEDURE — 71010 XR CHEST PORT 1 VW: CPT

## 2017-04-17 PROCEDURE — 36592 COLLECT BLOOD FROM PICC: CPT | Performed by: SURGERY

## 2017-04-17 PROCEDURE — 25000128 H RX IP 250 OP 636: Performed by: SURGERY

## 2017-04-17 PROCEDURE — 84100 ASSAY OF PHOSPHORUS: CPT | Performed by: SURGERY

## 2017-04-17 PROCEDURE — 97530 THERAPEUTIC ACTIVITIES: CPT | Mod: GP | Performed by: PHYSICAL THERAPIST

## 2017-04-17 PROCEDURE — 27210437 ZZH NUTRITION PRODUCT SEMIELEM INTERMED LITER

## 2017-04-17 PROCEDURE — 25000125 ZZHC RX 250: Performed by: SURGERY

## 2017-04-17 PROCEDURE — 40000193 ZZH STATISTIC PT WARD VISIT: Performed by: PHYSICAL THERAPIST

## 2017-04-17 PROCEDURE — 25000132 ZZH RX MED GY IP 250 OP 250 PS 637: Performed by: SURGERY

## 2017-04-17 PROCEDURE — 86140 C-REACTIVE PROTEIN: CPT | Performed by: SURGERY

## 2017-04-17 PROCEDURE — 83735 ASSAY OF MAGNESIUM: CPT | Performed by: SURGERY

## 2017-04-17 PROCEDURE — 85027 COMPLETE CBC AUTOMATED: CPT | Performed by: SURGERY

## 2017-04-17 PROCEDURE — 36600 WITHDRAWAL OF ARTERIAL BLOOD: CPT

## 2017-04-17 PROCEDURE — 00000146 ZZHCL STATISTIC GLUCOSE BY METER IP

## 2017-04-17 PROCEDURE — 86140 C-REACTIVE PROTEIN: CPT | Performed by: THORACIC SURGERY (CARDIOTHORACIC VASCULAR SURGERY)

## 2017-04-17 PROCEDURE — 84100 ASSAY OF PHOSPHORUS: CPT | Performed by: THORACIC SURGERY (CARDIOTHORACIC VASCULAR SURGERY)

## 2017-04-17 PROCEDURE — 25000132 ZZH RX MED GY IP 250 OP 250 PS 637: Performed by: THORACIC SURGERY (CARDIOTHORACIC VASCULAR SURGERY)

## 2017-04-17 PROCEDURE — 25000132 ZZH RX MED GY IP 250 OP 250 PS 637: Performed by: PHYSICIAN ASSISTANT

## 2017-04-17 PROCEDURE — 71020 XR CHEST 2 VW: CPT

## 2017-04-17 PROCEDURE — 80048 BASIC METABOLIC PNL TOTAL CA: CPT | Performed by: SURGERY

## 2017-04-17 PROCEDURE — 82803 BLOOD GASES ANY COMBINATION: CPT | Performed by: SURGERY

## 2017-04-17 PROCEDURE — 97116 GAIT TRAINING THERAPY: CPT | Mod: GP | Performed by: PHYSICAL THERAPIST

## 2017-04-17 PROCEDURE — 12000006 ZZH R&B IMCU INTERMEDIATE UMMC

## 2017-04-17 PROCEDURE — 85520 HEPARIN ASSAY: CPT | Performed by: THORACIC SURGERY (CARDIOTHORACIC VASCULAR SURGERY)

## 2017-04-17 PROCEDURE — 36415 COLL VENOUS BLD VENIPUNCTURE: CPT | Performed by: SURGERY

## 2017-04-17 PROCEDURE — 25000125 ZZHC RX 250: Performed by: PHYSICIAN ASSISTANT

## 2017-04-17 PROCEDURE — 25000128 H RX IP 250 OP 636: Performed by: STUDENT IN AN ORGANIZED HEALTH CARE EDUCATION/TRAINING PROGRAM

## 2017-04-17 PROCEDURE — 40000802 ZZH SITE CHECK

## 2017-04-17 RX ORDER — BISACODYL 10 MG
10 SUPPOSITORY, RECTAL RECTAL DAILY
Status: DISCONTINUED | OUTPATIENT
Start: 2017-04-17 | End: 2017-05-01 | Stop reason: HOSPADM

## 2017-04-17 RX ADMIN — SODIUM CHLORIDE, PRESERVATIVE FREE 5 ML: 5 INJECTION INTRAVENOUS at 03:34

## 2017-04-17 RX ADMIN — ACETAMINOPHEN 975 MG: 325 SOLUTION ORAL at 16:28

## 2017-04-17 RX ADMIN — PANTOPRAZOLE SODIUM 40 MG: 40 INJECTION, POWDER, FOR SOLUTION INTRAVENOUS at 09:21

## 2017-04-17 RX ADMIN — ACETAMINOPHEN 975 MG: 325 SOLUTION ORAL at 09:20

## 2017-04-17 RX ADMIN — ANTISEPTIC SURGICAL SCRUB: 0.04 SOLUTION TOPICAL at 20:00

## 2017-04-17 RX ADMIN — METOPROLOL TARTRATE 12.5 MG: 100 TABLET ORAL at 09:22

## 2017-04-17 RX ADMIN — SENNOSIDES A AND B 5 ML: 415.36 LIQUID ORAL at 19:58

## 2017-04-17 RX ADMIN — METHOCARBAMOL 500 MG: 500 TABLET ORAL at 13:30

## 2017-04-17 RX ADMIN — SODIUM CHLORIDE: 9 INJECTION, SOLUTION INTRAVENOUS at 04:46

## 2017-04-17 RX ADMIN — SODIUM PHOSPHATE, MONOBASIC, MONOHYDRATE AND SODIUM PHOSPHATE, DIBASIC, ANHYDROUS 15 MMOL: 276; 142 INJECTION, SOLUTION INTRAVENOUS at 19:59

## 2017-04-17 RX ADMIN — OXYCODONE HYDROCHLORIDE 5 MG: 5 SOLUTION ORAL at 04:08

## 2017-04-17 RX ADMIN — OXYCODONE HYDROCHLORIDE 5 MG: 5 SOLUTION ORAL at 13:24

## 2017-04-17 RX ADMIN — CHLORHEXIDINE GLUCONATE 15 ML: 1.2 RINSE ORAL at 19:58

## 2017-04-17 RX ADMIN — OXYCODONE HYDROCHLORIDE 5 MG: 5 SOLUTION ORAL at 19:58

## 2017-04-17 RX ADMIN — METOPROLOL TARTRATE 12.5 MG: 100 TABLET ORAL at 19:58

## 2017-04-17 RX ADMIN — SODIUM PHOSPHATE, MONOBASIC, MONOHYDRATE AND SODIUM PHOSPHATE, DIBASIC, ANHYDROUS 20 MMOL: 276; 142 INJECTION, SOLUTION INTRAVENOUS at 10:02

## 2017-04-17 RX ADMIN — OXYCODONE HYDROCHLORIDE 5 MG: 5 SOLUTION ORAL at 16:28

## 2017-04-17 RX ADMIN — HEPARIN SODIUM 1300 UNITS/HR: 10000 INJECTION, SOLUTION INTRAVENOUS at 20:14

## 2017-04-17 RX ADMIN — ACETAMINOPHEN 975 MG: 325 SOLUTION ORAL at 00:11

## 2017-04-17 RX ADMIN — SENNOSIDES A AND B 10 ML: 415.36 LIQUID ORAL at 09:20

## 2017-04-17 RX ADMIN — CHLORHEXIDINE GLUCONATE 15 ML: 1.2 RINSE ORAL at 09:20

## 2017-04-17 RX ADMIN — HEPARIN SODIUM 1150 UNITS/HR: 10000 INJECTION, SOLUTION INTRAVENOUS at 00:11

## 2017-04-17 RX ADMIN — OXYCODONE HYDROCHLORIDE 5 MG: 5 SOLUTION ORAL at 09:21

## 2017-04-17 RX ADMIN — BISACODYL 10 MG: 10 SUPPOSITORY RECTAL at 04:08

## 2017-04-17 RX ADMIN — PANTOPRAZOLE SODIUM 40 MG: 40 INJECTION, POWDER, FOR SOLUTION INTRAVENOUS at 19:58

## 2017-04-17 RX ADMIN — OXYCODONE HYDROCHLORIDE 5 MG: 5 SOLUTION ORAL at 00:11

## 2017-04-17 RX ADMIN — POLYETHYLENE GLYCOL 3350 17 G: 17 POWDER, FOR SOLUTION ORAL at 09:20

## 2017-04-17 RX ADMIN — ANTISEPTIC SURGICAL SCRUB: 0.04 SOLUTION TOPICAL at 09:22

## 2017-04-17 NOTE — PROVIDER NOTIFICATION
"Notified Dr. Harris of change to pt status:    Pt's pulse ox alarmed at 70% when he previously was mid 90s on RA. Pt states he feels like \"all the air was sucked out of my chest.\" Requiring 4-5L NC now to maintain saturation >92%. Other vitals unchanged. R chest tube site unchanged and output unchanged. Pharyngostomy output at this time changed from bile/green to sonia/red. Pt also states he feels pain to his R upper chest when he breathes in. Dr. Harris to assess pt at bedside.    Chest xray ordered, hemoglobin and blood gas ordered. Will continue to monitor.   "

## 2017-04-17 NOTE — PROGRESS NOTES
SURGERY CROSS-COVER NOTE  04/17/2017 1:00 AM    Patient: Jim Braden  MRN: [id number]    Subjective  Was paged regarding new O2 requirement and bloody pharyngostomy tube output. Per RN, pt desatted to 70% on RA and is now requiring 5L NC to maintain sats ~95%. He had been on room air earlier this evening.    Patient is POD#3 from esophagectomy for esophageal cancer. He also has a h/o DVT (on therapeutic Lovenox as outpatient), alpha 1 antitrypsin deficiency, HTN, and GERD. His postoperative course has been unremarkable thus far; he was transferred from the ICU to the floor this evening. His left chest tube was removed today and postpull CXR demonstrated a trace left apical PTX. He has been maintained on metoprolol 12.5mg BID for postop tachycardia and a low-intensity heparin ggt for his h/o DVT.      Patient reports waking up and feeling a sharp right-sided pain in his lateral chest radiating to the back and he reports he was unable to breathe at this point; this resolved with O2 and he now is feeling back to normal. He has some right-sided lateral chest pain that is persisting but much less in intensity and is present only on inspiration. He denies sternal or left-sided chest pain. He denies a personal history of heart disease. He has not had a BM or passed flatus yet; he would like an enema or suppository.      Objective  AF, normotensive and stable, resp rate stable, HR stable in high 90s-low 100s, appears sinus on telemetry  Was satting 96% on 4L NC when I started my exam; I turned off his O2 and he maintained at 90-91% on RA for at least 5 minutes.    General: AAO, NAD, lying in bed, appears comfortable  HEENT: pharyngostomy tube output with light old blood output in tubing, flushes easily  CV: RRR, no murmurs  Pulm: breathing comfortably, mild crackles bilaterally on anterior auscultation, right chest tube to water seal with s/s output, left chest tube site dressing c/d/i  Abd: soft, mildly distended,  nontender to palpation, incisions c/d/i with Dermabond, J tube in place  Extremities: warm, well-perfused without edema, no asymmetry or calf tenderness    I/O last 3 completed shifts:  In: 2732.1 [I.V.:2362.1; NG/GT:370]  Out: 1595 [Urine:1095; Drains:310; Chest Tube:190]    Labs this past am unremarkable. Hgb stable, no WBC, Plt wnl, Cr wnl.     Assessment/Plan:  DDx: PTX, PE, PNA.    - CXR to eval for intra-pulmonary process and pharyngostomy tube placement: possibly slightly worse left effusion, no PTX  - AB.42 / 38 / 62 / 25 on 4L NC. Will repeat ABG after pt receives supplemental oxygen for slightly longer as this ABG was done immediately after he felt SOB.  - Hgb to eval with bloody pharyngostomy tube output: 9.5 from 12.4 on 4/15. Will recheck CBC to check for thrombocytopenia given pt getting heparin. Will trend Hgb q4h and monitor for s/sx bleeding.  - Protonix increased to IV BID      Discussed with Dr. Hale.  - - - - - - - - - - - - - - - - - -  Elizabeth Harris MD  General Surgery PGY-1      ADDENDUM 3:30 AM:  Pt appears much more comfortable. Reports right-sided chest/flank pain still present but slowly improving.     AFVSS, satting 98% on 1L NC  NAD, breathing comfortably    AB.42 / 39 / 83 / 26 on 1L NC  WBC 6.6  Hgb 9.1  Plt 139    Suspect that desaturation previously was due to patient having pain from chest tube and splinting/not breathing deeply. Has now resolved.  Drop in Hgb likely dilutional as the 2-pt change was over 2 days and the patient had an extensive surgery/resuscitation, but will continue to consider bleeding as pt on heparin ggt.    - Will continue to monitor Hgb q4h. Continue heparin ggt.  - Continue to wean O2 as tolerated.      Discussed with Dr. Luna.  - - - - - - - - - - - - - - - - - -  Elizabeth Harris MD  General Surgery PGY-1

## 2017-04-17 NOTE — OP NOTE
DATE OF PROCEDURE:  04/14/2017      PREOPERATIVE DIAGNOSIS:  Esophageal cancer.       PROCEDURE PERFORMED:   1.  Right thoracoscopic esophageal mobilization with mediastinal lymphadenectomy.   2.  Thoracic duct ligation and excision.   3.  Laparoscopic transhiatal esophagectomy.   4.  Laparoscopic jejunostomy feeding tube placement.   5.  Left tube thoracostomy.   6.  Esophagogastroscopy.   7.  Pharyngostomy tube placement.   8.  Flexible bronchoscopy.      SURGEON:  Jeronimo Mendez MD (present and participated in the entire procedure).      RESIDENT SURGEON:  Marc Luna MD      ANESTHESIA:  General.      ESTIMATED BLOOD LOSS:  100 mL.      COMPLICATIONS:  None immediate.      FINDINGS:  We had a widely negative margin.  There was barely any tumor palpable at the GE junction.  At the end, a small portion of the lesser curvature staple line at the anastomosis appeared with venous congestion.  The anastomosis was widely patent and from the inside we just saw a very small fraction of venous congestion of mucosa at the lesser curvature staple line.      DESCRIPTION OF PROCEDURE:  With Jim Braden in the left lateral decubitus under general anesthesia with double-lumen tube in place, the right chest was prepared and draped in conventional fashion.  We used a 4-port approach and then took down the inferior pulmonary ligament and incised the pleura from the diaphragm all the way up to the thoracic inlet.  We transected the azygous vein and then we incised the pleura posteriorly from the azygous vein down to the diaphragm so that there was a band of pleura attached to the specimen.  We then did an extensive subcarinal lymph node dissection completely exposing the right and left main stem bronchi.  We took all the periesophageal fat in the chest peeling it off the pericardium, the hiatus and we ligated the thoracic duct and clipped multiple branches and included the thoracic duct in the en bloc resection.  We  clipped the thoracic duct just at the level of the azygous vein as it crosses into the left chest.  We then entered the left chest and took a portion of the inferior pulmonary ligament on the left en bloc with the specimen.  Once it was completely mobilized, we ensured hemostasis and then we placed a 24-Indonesian chest tube in the chest and secured it loosely to the chest wall with a #2 Vicryl so it would not be in direct contact with the conduit.  We ensured hemostasis, reinsufflated the lung and closed with absorbable sutures in the conventional fashion.      The patient was then turned in the supine position and the abdomen, chest and neck were prepared and draped in conventional fashion.  We performed an endoscopy and verified that the tumor was extending from about 38-40 cm.  We then used a 6-port approach in the abdomen and placed a Ya retractor.  We first did a jejunostomy feeding tube identifying the ligament of Treitz and then measuring about 30-40 cm distal to it.  We tacked the small bowel to the abdominal wall with an EndoStitch and then placed a small pursestring in the bowel then placed a needle through the abdominal wall through the pursestring and passed a wire.  We then dilated over the wire, placed a 12-Indonesian YAMILE J-tube over the wire, insufflated the balloon and then tightened the pursestring.  We finished tacking the bowel to the abdominal wall and then also placed in his distal antiobstruction stitch at about 4-5 cm distally.      We then took down the gastrohepatic ligament widely and we clearly identified the grossly enlarged lymph nodes.  We then mobilized the greater curvature, took down the short gastric vessels and followed the greater curvature all along clearly identifying the right gastroepiploic artery and preserving it.  We then performed a wide Kocher maneuver and injected 200 units of Botox in the pylorus.  At this point, I decided to first do the conduit so that I would have  easier access to the left gastric artery and all the lymph nodes.  We skeletonized the lesser curvature just above the incisura and then used purple loads to create a conduit about 5 cm in width.  We oversewed the staple line with an EndoStitch.  After this, we had wide open clear access to the origin of the left gastric artery.  We skeletonized all lymph nodes off the splenic artery and took some fatty tissue off the common hepatic artery.  We then very carefully skeletonized the origin of the left gastric artery and did a wide dissection of all the peritoneal tissue so that all those lymph nodes will be included en bloc with the specimen.  We then transected the skeletonized left gastric artery.  Finally, we did our hiatal dissection peeling the peritoneum off the crura and then connected with our transthoracic dissection without difficulty.  We stitched the specimen side to the tip of the conduit with an 0 silk.      Following this, we went and performed a left neck incision, transected the omohyoid and then identified the esophagus without difficulty.  I started pulling it up into the chest.  At the very end, the stitch broke but we were able to retrieve the tip of the stomach with an Allis clamp in the neck and pulled it up.  We transected about 5 cm of the stomach and then created a side-to-side functional end-to-end anastomosis using a 60 purple load.  We closed the common opening in 2 layers with 3-0 Vicryls and then 3-0 Lembert.  At the end, there was some venous congestion and duskiness in the very distal corner of the lesser curvature staple line, but it was a small portion and we did not have enough length or reach to revise the anastomosis.  For this reason, I decided not to do anything else again since it was a small portion.  We performed an endoscopy and we could get the adult gastroscope through the anastomosis and there was no leakage on insufflation.      We then placed a pharyngostomy tube behind  the left posterior tonsillar pillar by placing an MD Kwok clamp underneath behind it feeling it on the skin, stabbing and then placed a pharyngostomy tube over a wire into the distal conduit.      We irrigated the neck and closed the skin with staples and then laparoscopically took any redundant stomach out of the chest and placed several 0 silk stitches to close the hiatus by the hiatus to the conduit.  We then ensured hemostasis and closed with absorbable sutures in the conventional fashion.      We also placed a left a 19-Croatian Giuliano drain in the pleural space by making a small incision in anterior lateral left chest wall and advanced it and secured it.      At the very end, we performed a flexible bronchoscopy to suction out a mild amount of secretions.      The patient tolerated the procedure well.         MARY PENA MD             D: 2017 09:02   T: 2017 09:59   MT: CORI      Name:     SHANON BASHIR   MRN:      [id number]        Account:        [id number]   :      1960           Procedure Date: 2017      Document: [id number]       cc: [facility] Surgery Billing

## 2017-04-17 NOTE — PHARMACY-CONSULT NOTE
Pharmacy Tube Feeding Consult    Medication reviewed for administration by feeding tube and for potential food/drug interactions.    Recommendation: No changes are needed at this time. Tube feeds are via J-tube and meds are currently ordered as liquid if available.     Pharmacy will continue to follow as new medications are ordered.    Rebecca Fields, Pharm.D., [facility]  Pager [phone]

## 2017-04-17 NOTE — PROGRESS NOTES
Care Coordinator Progress Note     Admission Date/Time:  4/14/2017  Attending MD:  Jeronimo Mendez*     Data  Chart reviewed, discussed with interdisciplinary team.   Patient was admitted for: Esophagectomy    Concerns with insurance coverage for discharge needs: None identified  Current Living Situation: Patient lives with Wife  Support System: Clay Samuels is Primary Caregiver  Services Involved: none  Transportation: Wife  Barriers to Discharge: None identified    Coordination of Care and Referrals: Provided patient/family with options for Home Infusion        Assessment  Pt with history of Esophageal cancer, s/p esophagectomy 4/14/17 anticipated discharge approx 1 week from now.  I have met with Pt and Wife, Abril to introduce myself and care coordinator role.  Prior to admission Pt was independent living with his Wife in [facility].  Pt will require continued  tube feedings post discharge, choice of Home infusion agencies offered, [facility] is preferred.  I have made a referral to [facility] #[phone] and added [facility] to the discharge orders.  I have also scheduled a [facility] appointment for TF teaching on Friday 4/21/17 @ 3pm, Wife plans to attend.  Pt and Wife currently have no outstanding questions or concerns.         Plan  Anticipated Discharge Date:  Approx 7 days  Anticipated Discharge Plan:  D/C to home with [facility] for TF support    Diana Acevedo RN   6B care coordinator #[phone]

## 2017-04-17 NOTE — PROGRESS NOTES
"CLINICAL NUTRITION SERVICES - ASSESSMENT NOTE     Nutrition Prescription    RECOMMENDATIONS FOR MDs/PROVIDERS TO ORDER:  If Phos persists < 2.5, despite IV replacement, would recommend ordering 1-4 packets of NeutraPhos daily. Discontinue when TF is at goal and Phos remains in the mid-to-high end of the normal range consistently.      Malnutrition Status:    Patient does not meet two of the above criteria necessary for diagnosing malnutrition but is at risk for malnutrition    Recommendations already ordered by Registered Dietitian (RD):  1. Pending recheck of Phos and K+/Mg++ WNL, would recommend Impact Peptide @ goal 70 ml/hr (1680 ml) to provide 2520 kcal (30 kcal/kg), 158 gm protein (1.9 gm/kg), 235 gm CHO, 1294 mL free H2O, and 0 fiber daily per dosing wt of 84 kg.  2. Start at 10 mL/hr and advance by 10 ml Q 8 hrs as tolerated and if K+/Mg++ WNL and Phos >1.9.  3. Water flushes of 30 Q 4 hrs for tube patency.  4. Recommend 15 ml Certavite daily to help meet micronutrient needs  5. Recommend ordering acute phase protein (CRP) now, and weekly, to determine need for immune-modulating TF formula vs maintenance formula.    Future/Additional Recommendations:  - Monitor further surgeries/potential for diet advancement and need for post-op esophagectomy diet education.      REASON FOR ASSESSMENT  Jim Braden is a/an 56 year old male assessed by the dietitian for Provider Order - Registered Dietitian to Assess and Order TF per Medical Nutrition Therapy Protocol    -PMH: distal esophageal adenocarcinoma stage 3C, s/p chemotherapy and esophagectomy    NUTRITION HISTORY  - Per pt he typically ate 100% of meals TID + snacks PTA. He did not have decreased intake PTA and did not report any N/V/abdominal pain. He did not follow a specific diet at home and would typically \"eat everything\"   B: banana, oatmeal   L: sandwich or leftovers   D: protein/starch/veggie, or pizza   Snacks: nuts, chocolate   Beverages: Squirt, water, " "peach tea    CURRENT NUTRITION ORDERS  Diet: NPO    LABS  Labs reviewed  Phos 1.3 (L) - per RN, being replaced now. Lyte replacements in place.    MEDICATIONS  Medications reviewed    ANTHROPOMETRICS  Height: 180.3 cm (5' 11\")  Most Recent Weight: 84.1 kg (185 lb 4.8 oz) (standing scale 2)    IBW: 78 kg (108% IBW)  BMI: Overweight BMI 25-29.9  Weight History: Pt currently at admit wt of 84.1 kg. No significant wt loss noted.  Wt Readings from Last 10 Encounters:   04/17/17 84.1 kg (185 lb 4.8 oz)   04/07/17 83.9 kg (185 lb)   03/15/17 81.9 kg (180 lb 8 oz)   01/11/17 80.4 kg (177 lb 4.8 oz)     Dosing Weight: 84 kg (based on lowest wt this admission 4/14)    ASSESSED NUTRITION NEEDS  Estimated Energy Needs: 6518-3673 kcals/day (25 - 30 kcals/kg)  Justification: Increased needs and Post-op  Estimated Protein Needs: 126-168 grams protein/day (1.5 - 2.0 grams of pro/kg)  Justification: Increased needs and Post-op  Estimated Fluid Needs: 1 mL/kcal or per provider  Justification: Maintenance    PHYSICAL FINDINGS  See malnutrition section below.  Pharyngostomy tube and surgical incisions present     MALNUTRITION  % Intake: Decreased intake does not meet criteria  % Weight Loss: None noted  Subcutaneous Fat Loss: None observed  Muscle Loss: None observed  Fluid Accumulation/Edema: None noted  Malnutrition Diagnosis: Patient does not meet two of the above criteria necessary for diagnosing malnutrition but is at risk for malnutrition    NUTRITION DIAGNOSIS  Inadequate oral intake related to NPO s/p esophagectomy as evidenced by NPO x 3 days and provider order for RD to assess and order TF.       INTERVENTIONS  Implementation  Nutrition Education: Per provider order if indicated   Collaboration with other providers - discussed Phos replacement with RN  Enteral Nutrition - Initiate     Goals  Total avg nutritional intake to meet a minimum of 25 kcal/kg and 1.5 g PRO/kg daily (per dosing wt 84 kg).   "   Monitoring/Evaluation  Progress toward goals will be monitored and evaluated per protocol.    [place], Dietetic Intern    I agree with the above recommendations, goals, and interventions.    Daljit Kovacs RD, LD  6B Clinical Dietitian  Pager: [phone]  [facility] [phone]

## 2017-04-17 NOTE — PLAN OF CARE
Problem: Goal Outcome Summary  Goal: Goal Outcome Summary  Outcome: No Change  Temp:  [98.2  F (36.8  C)-99.5  F (37.5  C)] 98.5  F (36.9  C)  Heart Rate:  [] 89  Resp:  [19-24] 20  BP: (117-151)/(71-92) 146/86  SpO2:  [92 %-98 %] 97 %    Shift 0790-6865  Neuro: A/Ox4  Cardiac:NSR  Respiratory: at end of shift, pt is saturating well on 1.5L NC. Coarse lung sounds to upper lobes. Episode at 0100, pt felt sharp pain to R side chest and oxygen saturation declined to 70% requiring 5L, resolved quickly and has not has a similar episode since.      Chest Tube R side to water seal, minimal serosanguinous output. Dressing CDI.    Capnography d/c per order (more than 24hr post op).   GI/: Hypoactive bowel sounds, not passing flatus. Suppository given without output thus far. Urine output 300mL at midnight, due to void this am.      J tube used for meds, gravity drainage otherwise.   Diet/appetite: NPO. Mouth moistened with water/spit out. Pharyngotomy to LIS, bile output. Irrigated Q4.  BG monitored Q4, 102, 115.   Activity: Up to chair with SBA. Up to chair x2 over night.   Pain: Negligible tube sites, covered well with scheduled  Oxycodone Q4. No prn's required.   Skin: No new deficits.      Heparin gtt managed per titration order, next 10a ordered at 12pm.       Continue with POC. Notify primary team with changes.

## 2017-04-17 NOTE — PROGRESS NOTES
Thoracic Surgery Progress Note  Jim Braden  [id number]    Subjective  Brief period of increased O2 requirements last evening, since weaned back down to 1L.  Related to R sided chest pain.    Objective  Temp:  [98.2  F (36.8  C)-99.5  F (37.5  C)] 98.5  F (36.9  C)  Heart Rate:  [] 89  Resp:  [19-24] 20  BP: (117-151)/(71-92) 146/86  SpO2:  [92 %-98 %] 97 %    Physical Exam:  Gen: Awake, alert, NAD  HEENT: Cervical crepitus, incision c/d/i with staples in place, pharyngostomy functional  CVS: RRR  Resp: NLB on 1.5L, CT serosang  Abd: Soft, appropriately TTP, incision c/d/i  Extrem: WWP    UOP 1.1L  Pharyngostomy 310/250  R Pl 180/160  G 150    Cr normal, phos low  Hgb stable, 9.3    CXR from early this AM unchanged    Assessment & Plan  56 year old male with history of LE DVT on lovenox, neoadjuvant chemo, s/p thoracoscopic/laparoscpic esophagectomy with cervical anastomosis, J tube, pharyngostomy tube placement 4/14 for lower esophageal adenocarcinoma.    Neuro: Scheduled tylenol, dilaudid PCA  Resp: Prn NC, aggressive pulm toilet, unclear of etiology of desat, since improved, ?pain, on heparin gtt for DVT  Cardio: Holding home lisinopril with MECCA, c/w metoprolol 12.5 mg BID  GI/FEN: NPO, okay to use J for meds, pharyngostomy cares/flushes ordered, bowel reg, possibly trophic feeds today  Renal: Adequate UOP, Cr normalizing, possibly some diuresis today, will eval after CXR  Endo: No home meds  ID/heme: Periop zosyn completed, trend hgb  PPx: LIH gtt, protonix    To be discussed with staff.    Isidoro Perez MD  General Surgery PGY-3  [phone]

## 2017-04-17 NOTE — PLAN OF CARE
Problem: Goal Outcome Summary  Goal: Goal Outcome Summary  PT 6B: Pt completes bed mobility and sit<>stand transfers with CGA. Ambulating up to ~350 ft with B UE support on IV pole and close SBA. Pt steady on feet with use of IV pole for support. SpO2 did drop to 86% at lowest with ambulation while on RA, recovers to 89-90% with standing rest breaks and PLB (back to 93% with seated break back in room).     Encourage ambulation with RN staff as well to progress activity tolerance.     Anticipate discharge to home with assist once medically appropriate.

## 2017-04-18 ENCOUNTER — APPOINTMENT (OUTPATIENT)
Dept: GENERAL RADIOLOGY | Facility: CLINIC | Age: 57
DRG: 357 | End: 2017-04-18
Attending: THORACIC SURGERY (CARDIOTHORACIC VASCULAR SURGERY)
Payer: COMMERCIAL

## 2017-04-18 ENCOUNTER — APPOINTMENT (OUTPATIENT)
Dept: PHYSICAL THERAPY | Facility: CLINIC | Age: 57
DRG: 357 | End: 2017-04-18
Attending: THORACIC SURGERY (CARDIOTHORACIC VASCULAR SURGERY)
Payer: COMMERCIAL

## 2017-04-18 LAB
ANION GAP SERPL CALCULATED.3IONS-SCNC: 8 MMOL/L (ref 3–14)
BLD PROD TYP BPU: NORMAL
BLD PROD TYP BPU: NORMAL
BLD UNIT ID BPU: 0
BLD UNIT ID BPU: 0
BLOOD PRODUCT CODE: NORMAL
BLOOD PRODUCT CODE: NORMAL
BPU ID: NORMAL
BPU ID: NORMAL
BUN SERPL-MCNC: 14 MG/DL (ref 7–30)
CALCIUM SERPL-MCNC: 8.5 MG/DL (ref 8.5–10.1)
CHLORIDE SERPL-SCNC: 109 MMOL/L (ref 94–109)
CO2 SERPL-SCNC: 24 MMOL/L (ref 20–32)
CREAT SERPL-MCNC: 0.7 MG/DL (ref 0.66–1.25)
ERYTHROCYTE [DISTWIDTH] IN BLOOD BY AUTOMATED COUNT: 14.4 % (ref 10–15)
GFR SERPL CREATININE-BSD FRML MDRD: ABNORMAL ML/MIN/1.7M2
GLUCOSE BLDC GLUCOMTR-MCNC: 102 MG/DL (ref 70–99)
GLUCOSE BLDC GLUCOMTR-MCNC: 108 MG/DL (ref 70–99)
GLUCOSE BLDC GLUCOMTR-MCNC: 110 MG/DL (ref 70–99)
GLUCOSE BLDC GLUCOMTR-MCNC: 121 MG/DL (ref 70–99)
GLUCOSE BLDC GLUCOMTR-MCNC: 98 MG/DL (ref 70–99)
GLUCOSE SERPL-MCNC: 109 MG/DL (ref 70–99)
HCT VFR BLD AUTO: 30.3 % (ref 40–53)
HGB BLD-MCNC: 9.8 G/DL (ref 13.3–17.7)
LMWH PPP CHRO-ACNC: 0.17 IU/ML
MCH RBC QN AUTO: 31.2 PG (ref 26.5–33)
MCHC RBC AUTO-ENTMCNC: 32.3 G/DL (ref 31.5–36.5)
MCV RBC AUTO: 97 FL (ref 78–100)
PHOSPHATE SERPL-MCNC: 2.5 MG/DL (ref 2.5–4.5)
PLATELET # BLD AUTO: 175 10E9/L (ref 150–450)
POTASSIUM SERPL-SCNC: 3.5 MMOL/L (ref 3.4–5.3)
RBC # BLD AUTO: 3.14 10E12/L (ref 4.4–5.9)
SODIUM SERPL-SCNC: 141 MMOL/L (ref 133–144)
TRANSFUSION STATUS PATIENT QL: NORMAL
WBC # BLD AUTO: 5.7 10E9/L (ref 4–11)

## 2017-04-18 PROCEDURE — 85520 HEPARIN ASSAY: CPT | Performed by: THORACIC SURGERY (CARDIOTHORACIC VASCULAR SURGERY)

## 2017-04-18 PROCEDURE — 25000132 ZZH RX MED GY IP 250 OP 250 PS 637: Performed by: SURGERY

## 2017-04-18 PROCEDURE — 36415 COLL VENOUS BLD VENIPUNCTURE: CPT | Performed by: THORACIC SURGERY (CARDIOTHORACIC VASCULAR SURGERY)

## 2017-04-18 PROCEDURE — 80048 BASIC METABOLIC PNL TOTAL CA: CPT | Performed by: THORACIC SURGERY (CARDIOTHORACIC VASCULAR SURGERY)

## 2017-04-18 PROCEDURE — 25000132 ZZH RX MED GY IP 250 OP 250 PS 637: Performed by: STUDENT IN AN ORGANIZED HEALTH CARE EDUCATION/TRAINING PROGRAM

## 2017-04-18 PROCEDURE — 12000006 ZZH R&B IMCU INTERMEDIATE UMMC

## 2017-04-18 PROCEDURE — 00000146 ZZHCL STATISTIC GLUCOSE BY METER IP

## 2017-04-18 PROCEDURE — 25000128 H RX IP 250 OP 636: Performed by: PHYSICIAN ASSISTANT

## 2017-04-18 PROCEDURE — 25000125 ZZHC RX 250: Performed by: SURGERY

## 2017-04-18 PROCEDURE — 84100 ASSAY OF PHOSPHORUS: CPT | Performed by: THORACIC SURGERY (CARDIOTHORACIC VASCULAR SURGERY)

## 2017-04-18 PROCEDURE — 25000132 ZZH RX MED GY IP 250 OP 250 PS 637: Performed by: PHYSICIAN ASSISTANT

## 2017-04-18 PROCEDURE — 40000193 ZZH STATISTIC PT WARD VISIT: Performed by: PHYSICAL THERAPIST

## 2017-04-18 PROCEDURE — 97116 GAIT TRAINING THERAPY: CPT | Mod: GP | Performed by: PHYSICAL THERAPIST

## 2017-04-18 PROCEDURE — 71020 XR CHEST 2 VW: CPT

## 2017-04-18 PROCEDURE — 85027 COMPLETE CBC AUTOMATED: CPT | Performed by: THORACIC SURGERY (CARDIOTHORACIC VASCULAR SURGERY)

## 2017-04-18 PROCEDURE — 97110 THERAPEUTIC EXERCISES: CPT | Mod: GP | Performed by: PHYSICAL THERAPIST

## 2017-04-18 PROCEDURE — 25000132 ZZH RX MED GY IP 250 OP 250 PS 637: Performed by: THORACIC SURGERY (CARDIOTHORACIC VASCULAR SURGERY)

## 2017-04-18 PROCEDURE — 40000802 ZZH SITE CHECK

## 2017-04-18 RX ORDER — GUAR GUM
1 PACKET (EA) ORAL DAILY
Status: DISCONTINUED | OUTPATIENT
Start: 2017-04-18 | End: 2017-05-01 | Stop reason: HOSPADM

## 2017-04-18 RX ADMIN — MULTIVITAMIN 15 ML: LIQUID ORAL at 09:18

## 2017-04-18 RX ADMIN — OXYCODONE HYDROCHLORIDE 5 MG: 5 SOLUTION ORAL at 00:25

## 2017-04-18 RX ADMIN — POLYETHYLENE GLYCOL 3350 17 G: 17 POWDER, FOR SOLUTION ORAL at 09:18

## 2017-04-18 RX ADMIN — ANTISEPTIC SURGICAL SCRUB: 0.04 SOLUTION TOPICAL at 11:14

## 2017-04-18 RX ADMIN — METOPROLOL TARTRATE 12.5 MG: 100 TABLET ORAL at 19:41

## 2017-04-18 RX ADMIN — CHLORHEXIDINE GLUCONATE 15 ML: 1.2 RINSE ORAL at 09:18

## 2017-04-18 RX ADMIN — OXYCODONE HYDROCHLORIDE 5 MG: 5 SOLUTION ORAL at 19:23

## 2017-04-18 RX ADMIN — HYDROMORPHONE HYDROCHLORIDE 0.2 MG: 10 INJECTION, SOLUTION INTRAMUSCULAR; INTRAVENOUS; SUBCUTANEOUS at 22:44

## 2017-04-18 RX ADMIN — ANTISEPTIC SURGICAL SCRUB: 0.04 SOLUTION TOPICAL at 19:23

## 2017-04-18 RX ADMIN — OXYCODONE HYDROCHLORIDE 5 MG: 5 SOLUTION ORAL at 09:30

## 2017-04-18 RX ADMIN — PANTOPRAZOLE SODIUM 40 MG: 40 INJECTION, POWDER, FOR SOLUTION INTRAVENOUS at 19:23

## 2017-04-18 RX ADMIN — ACETAMINOPHEN 650 MG: 325 SOLUTION ORAL at 09:30

## 2017-04-18 RX ADMIN — METOPROLOL TARTRATE 12.5 MG: 100 TABLET ORAL at 09:18

## 2017-04-18 RX ADMIN — OXYCODONE HYDROCHLORIDE 5 MG: 5 SOLUTION ORAL at 19:21

## 2017-04-18 RX ADMIN — Medication 1 PACKET: at 19:41

## 2017-04-18 RX ADMIN — OXYCODONE HYDROCHLORIDE 5 MG: 5 SOLUTION ORAL at 04:45

## 2017-04-18 RX ADMIN — SODIUM PHOSPHATE, MONOBASIC, MONOHYDRATE AND SODIUM PHOSPHATE, DIBASIC, ANHYDROUS 10 MMOL: 276; 142 INJECTION, SOLUTION INTRAVENOUS at 15:57

## 2017-04-18 RX ADMIN — CHLORHEXIDINE GLUCONATE 15 ML: 1.2 RINSE ORAL at 19:22

## 2017-04-18 RX ADMIN — CHLORASEPTIC 1 ML: 1.5 LIQUID ORAL at 15:57

## 2017-04-18 RX ADMIN — SENNOSIDES A AND B 10 ML: 415.36 LIQUID ORAL at 09:18

## 2017-04-18 RX ADMIN — OXYCODONE HYDROCHLORIDE 5 MG: 5 SOLUTION ORAL at 11:56

## 2017-04-18 RX ADMIN — PANTOPRAZOLE SODIUM 40 MG: 40 INJECTION, POWDER, FOR SOLUTION INTRAVENOUS at 09:18

## 2017-04-18 RX ADMIN — OXYCODONE HYDROCHLORIDE 5 MG: 5 SOLUTION ORAL at 15:57

## 2017-04-18 ASSESSMENT — PAIN DESCRIPTION - DESCRIPTORS: DESCRIPTORS: SORE

## 2017-04-18 NOTE — PLAN OF CARE
Problem: Goal Outcome Summary  Goal: Goal Outcome Summary  OT 6B: OT evaluation not initiated; per discussion with PT and chart review, patient does not present with concerns requiring OT consult at this time; PT is following for functional mobility and endurance training. OT orders will be completed.

## 2017-04-18 NOTE — PROGRESS NOTES
CLINICAL NUTRITION SERVICES - BRIEF NOTE    RN received verbal order from provider to increase TF advancement schedule to Q 8hrs. RN requested RD change order for clarification.   TF previously advancing Q 12 hrs (written in comments of TF order).    Interventions  1. Changed advancement schedule per request: From current 30 ml/hr, continue to adv by 10 mL q 8 hrs if tolerated and K+/Mg++ WNL, Phos >1.9.    [place], Dietetic Intern    I agree with the above recommendations, goals, and interventions.    Daljit Kovacs RD, LD  6B Clinical Dietitian  Pager: [phone]  [facility] [phone]

## 2017-04-18 NOTE — PLAN OF CARE
Problem: Goal Outcome Summary  Goal: Goal Outcome Summary  PT 6B: Pt completes bed mobility and transfers with CGA-SBA. Progressed to ambulating up to a total of ~450 ft with single UE support on IV pole and SBA. Standing rest breaks taken throughout d/t SOB and fatigue, but tolerates well. SpO2 does drop to 87% on RA with activity, recovers to 90% with standing rest breaks and PLB. -125 bpm with activity.     Anticipate with continued progress, pt will be appropriate to discharge to home with assist.

## 2017-04-18 NOTE — PLAN OF CARE
"Problem: Goal Outcome Summary  Goal: Goal Outcome Summary  Outcome: No Change  /88 (BP Location: Right arm)  Pulse 71  Temp 98.4  F (36.9  C) (Oral)  Resp 20  Ht 1.803 m (5' 11\")  Wt 83.5 kg (184 lb 1.4 oz)  SpO2 96%  BMI 25.67 kg/m2      A & O x3, VSS. SR. RA. Had CXR this am. Right neck pharyngostomy to LIS- irrigated 2 times. Denies pain. Right CT to water seal put out 150ml serosang drainage. Heparin drip at 1300 units/hr. Hep 10A therapeutic. Phos 2.5, needs 10 mmol of Sodium Phos- ordered waiting for meds to come from pharmacy. TF increased to 30ml/hr at 2pm, next increase will be at 10pm. Voiding per urinal. Loose stool x1. Up with SBA. /102          "

## 2017-04-18 NOTE — DOWNTIME EVENT NOTE
The EMR was down for 12 hours on 4/18/2017.    Yola Sierra was responsible for completing the paper charting during this time period.     The following information was re-entered into the system by Jose Calderón: Vitals, I/O, Medications    The following information will remain in the paper chart: Assessment, Vitals, Paper MAR, Shift note, I/O    Jose Calderón  4/18/2017

## 2017-04-18 NOTE — PROGRESS NOTES
Care Coordinator Progress Note     Admission Date/Time:  4/14/2017  Attending MD:  Jeronimo Mendez*     Data  Chart reviewed, discussed with interdisciplinary team.   Patient was admitted for: Esophagectomy    Concerns with insurance coverage for discharge needs: Pts insurance does not cover home tube feeding formula or tube feeding supplies      Assessment  Referral made to  Home Infusion yesterday for tube feeding support post hospitalization. HI checked benefits, Pt does not have coverage for any tube feeding formula or supplies.  I have updated Pt and his Wife, Abril that cost of Tube feeding formula, supplies and pump will be private pay.  CC will follow.       Plan  Anticipated Discharge Date:  TBD   Anticipated Discharge Plan:  Discharge to home  With Tube Feeding support    Diana Acevedo RN   6B care coordinator #[phone]

## 2017-04-18 NOTE — PROGRESS NOTES
Thoracic Surgery Progress Note  Jim Braden  [id number]    Subjective  Doing well, no acute complaints.  +ROBF.    Objective  Temp:  [98.4  F (36.9  C)-98.8  F (37.1  C)] 98.4  F (36.9  C)  Heart Rate:  [] 84  Resp:  [18-20] 20  BP: (141-156)/(88-98) 144/88  SpO2:  [93 %-96 %] 96 %    Physical Exam:  Gen: Awake, alert, NAD  HEENT: Cervical crepitus stable, neck incision open and healthy appearing, pharyngostomy functioning well  CVS: RRR  Resp: NLB, CT serosang  Abd: Soft, appropriately TTP, incision c/d/i  Extrem: WWP    CXR stable    Assessment & Plan  56 year old male with history of LE DVT on lovenox, neoadjuvant chemo, s/p thoracoscopic/laparoscpic esophagectomy with cervical anastomosis, J tube, pharyngostomy tube placement 4/14 for lower esophageal adenocarcinoma.    Neuro: Scheduled tylenol, dilaudid PCA  Resp: PRN NC, aggressive pulm toilet  Cardio: Holding home lisinopril, MECCA resolved, c/w metoprolol 12.5 mg BID  GI/FEN: NPO, okay to use J for meds, pharyngostomy cares/flushes ordered, bowel reg, advance feeds to goal  Renal: Adequate UOP, continue to monitor  Endo: No home meds  ID/heme: Periop zosyn completed, trend hgb/wbc  PPx: LIH gtt, protonix    Maxime Rosa PA-C  P: [phone]

## 2017-04-19 ENCOUNTER — APPOINTMENT (OUTPATIENT)
Dept: GENERAL RADIOLOGY | Facility: CLINIC | Age: 57
DRG: 357 | End: 2017-04-19
Attending: THORACIC SURGERY (CARDIOTHORACIC VASCULAR SURGERY)
Payer: COMMERCIAL

## 2017-04-19 ENCOUNTER — APPOINTMENT (OUTPATIENT)
Dept: PHYSICAL THERAPY | Facility: CLINIC | Age: 57
DRG: 357 | End: 2017-04-19
Attending: THORACIC SURGERY (CARDIOTHORACIC VASCULAR SURGERY)
Payer: COMMERCIAL

## 2017-04-19 ENCOUNTER — HOSPITAL ENCOUNTER (INPATIENT)
Dept: VASCULAR ULTRASOUND | Facility: CLINIC | Age: 57
DRG: 357 | End: 2017-04-19
Attending: PHYSICIAN ASSISTANT | Admitting: INTERNAL MEDICINE
Payer: COMMERCIAL

## 2017-04-19 LAB
ANION GAP SERPL CALCULATED.3IONS-SCNC: 10 MMOL/L (ref 3–14)
BUN SERPL-MCNC: 18 MG/DL (ref 7–30)
CALCIUM SERPL-MCNC: 8.9 MG/DL (ref 8.5–10.1)
CHLORIDE SERPL-SCNC: 108 MMOL/L (ref 94–109)
CO2 SERPL-SCNC: 24 MMOL/L (ref 20–32)
CREAT SERPL-MCNC: 0.64 MG/DL (ref 0.66–1.25)
ERYTHROCYTE [DISTWIDTH] IN BLOOD BY AUTOMATED COUNT: 14.3 % (ref 10–15)
GFR SERPL CREATININE-BSD FRML MDRD: ABNORMAL ML/MIN/1.7M2
GLUCOSE BLDC GLUCOMTR-MCNC: 126 MG/DL (ref 70–99)
GLUCOSE BLDC GLUCOMTR-MCNC: 128 MG/DL (ref 70–99)
GLUCOSE BLDC GLUCOMTR-MCNC: 133 MG/DL (ref 70–99)
GLUCOSE BLDC GLUCOMTR-MCNC: 134 MG/DL (ref 70–99)
GLUCOSE BLDC GLUCOMTR-MCNC: 140 MG/DL (ref 70–99)
GLUCOSE BLDC GLUCOMTR-MCNC: 143 MG/DL (ref 70–99)
GLUCOSE BLDC GLUCOMTR-MCNC: 144 MG/DL (ref 70–99)
GLUCOSE SERPL-MCNC: 125 MG/DL (ref 70–99)
HCT VFR BLD AUTO: 30.6 % (ref 40–53)
HGB BLD-MCNC: 10.1 G/DL (ref 13.3–17.7)
LMWH PPP CHRO-ACNC: NORMAL IU/ML
LMWH PPP CHRO-ACNC: NORMAL IU/ML
MCH RBC QN AUTO: 31.4 PG (ref 26.5–33)
MCHC RBC AUTO-ENTMCNC: 33 G/DL (ref 31.5–36.5)
MCV RBC AUTO: 95 FL (ref 78–100)
PHOSPHATE SERPL-MCNC: 3.6 MG/DL (ref 2.5–4.5)
PLATELET # BLD AUTO: 192 10E9/L (ref 150–450)
POTASSIUM SERPL-SCNC: 3.4 MMOL/L (ref 3.4–5.3)
RADIOLOGIST FLAGS: ABNORMAL
RBC # BLD AUTO: 3.22 10E12/L (ref 4.4–5.9)
SODIUM SERPL-SCNC: 142 MMOL/L (ref 133–144)
WBC # BLD AUTO: 6.7 10E9/L (ref 4–11)

## 2017-04-19 PROCEDURE — 00000146 ZZHCL STATISTIC GLUCOSE BY METER IP

## 2017-04-19 PROCEDURE — 25000132 ZZH RX MED GY IP 250 OP 250 PS 637: Performed by: PHYSICIAN ASSISTANT

## 2017-04-19 PROCEDURE — 36415 COLL VENOUS BLD VENIPUNCTURE: CPT | Performed by: THORACIC SURGERY (CARDIOTHORACIC VASCULAR SURGERY)

## 2017-04-19 PROCEDURE — 25000128 H RX IP 250 OP 636: Performed by: SURGERY

## 2017-04-19 PROCEDURE — 36592 COLLECT BLOOD FROM PICC: CPT | Performed by: STUDENT IN AN ORGANIZED HEALTH CARE EDUCATION/TRAINING PROGRAM

## 2017-04-19 PROCEDURE — 85027 COMPLETE CBC AUTOMATED: CPT | Performed by: STUDENT IN AN ORGANIZED HEALTH CARE EDUCATION/TRAINING PROGRAM

## 2017-04-19 PROCEDURE — 84100 ASSAY OF PHOSPHORUS: CPT | Performed by: STUDENT IN AN ORGANIZED HEALTH CARE EDUCATION/TRAINING PROGRAM

## 2017-04-19 PROCEDURE — 71020 XR CHEST 2 VW: CPT

## 2017-04-19 PROCEDURE — 80048 BASIC METABOLIC PNL TOTAL CA: CPT | Performed by: STUDENT IN AN ORGANIZED HEALTH CARE EDUCATION/TRAINING PROGRAM

## 2017-04-19 PROCEDURE — 25000125 ZZHC RX 250: Performed by: SURGERY

## 2017-04-19 PROCEDURE — 25000132 ZZH RX MED GY IP 250 OP 250 PS 637: Performed by: THORACIC SURGERY (CARDIOTHORACIC VASCULAR SURGERY)

## 2017-04-19 PROCEDURE — 85520 HEPARIN ASSAY: CPT | Performed by: THORACIC SURGERY (CARDIOTHORACIC VASCULAR SURGERY)

## 2017-04-19 PROCEDURE — 12000001 ZZH R&B MED SURG/OB UMMC

## 2017-04-19 PROCEDURE — 36415 COLL VENOUS BLD VENIPUNCTURE: CPT | Performed by: STUDENT IN AN ORGANIZED HEALTH CARE EDUCATION/TRAINING PROGRAM

## 2017-04-19 PROCEDURE — 97116 GAIT TRAINING THERAPY: CPT | Mod: GP | Performed by: PHYSICAL THERAPIST

## 2017-04-19 PROCEDURE — 99212 OFFICE O/P EST SF 10 MIN: CPT | Mod: 25

## 2017-04-19 PROCEDURE — 40000193 ZZH STATISTIC PT WARD VISIT: Performed by: PHYSICAL THERAPIST

## 2017-04-19 PROCEDURE — 25000131 ZZH RX MED GY IP 250 OP 636 PS 637: Performed by: STUDENT IN AN ORGANIZED HEALTH CARE EDUCATION/TRAINING PROGRAM

## 2017-04-19 PROCEDURE — 85520 HEPARIN ASSAY: CPT | Performed by: STUDENT IN AN ORGANIZED HEALTH CARE EDUCATION/TRAINING PROGRAM

## 2017-04-19 PROCEDURE — 25000132 ZZH RX MED GY IP 250 OP 250 PS 637: Performed by: SURGERY

## 2017-04-19 RX ORDER — LIDOCAINE 40 MG/G
CREAM TOPICAL
Status: DISCONTINUED | OUTPATIENT
Start: 2017-04-19 | End: 2017-05-01 | Stop reason: HOSPADM

## 2017-04-19 RX ORDER — HEPARIN SODIUM,PORCINE 10 UNIT/ML
2-5 VIAL (ML) INTRAVENOUS
Status: COMPLETED | OUTPATIENT
Start: 2017-04-19 | End: 2017-04-22

## 2017-04-19 RX ORDER — HEPARIN SODIUM 10000 [USP'U]/100ML
0-3500 INJECTION, SOLUTION INTRAVENOUS CONTINUOUS
Status: DISCONTINUED | OUTPATIENT
Start: 2017-04-19 | End: 2017-04-29

## 2017-04-19 RX ADMIN — HEPARIN SODIUM 1000 UNITS/HR: 10000 INJECTION, SOLUTION INTRAVENOUS at 06:40

## 2017-04-19 RX ADMIN — CHLORHEXIDINE GLUCONATE 15 ML: 1.2 RINSE ORAL at 09:35

## 2017-04-19 RX ADMIN — MULTIVITAMIN 15 ML: LIQUID ORAL at 09:35

## 2017-04-19 RX ADMIN — METOPROLOL TARTRATE 12.5 MG: 100 TABLET ORAL at 20:29

## 2017-04-19 RX ADMIN — HEPARIN SODIUM 1300 UNITS/HR: 10000 INJECTION, SOLUTION INTRAVENOUS at 22:48

## 2017-04-19 RX ADMIN — METOPROLOL TARTRATE 12.5 MG: 100 TABLET ORAL at 09:35

## 2017-04-19 RX ADMIN — OXYCODONE HYDROCHLORIDE 5 MG: 5 SOLUTION ORAL at 12:10

## 2017-04-19 RX ADMIN — PANTOPRAZOLE SODIUM 40 MG: 40 INJECTION, POWDER, FOR SOLUTION INTRAVENOUS at 09:35

## 2017-04-19 RX ADMIN — INSULIN ASPART 1 UNITS: 100 INJECTION, SOLUTION INTRAVENOUS; SUBCUTANEOUS at 02:11

## 2017-04-19 RX ADMIN — OXYCODONE HYDROCHLORIDE 5 MG: 5 SOLUTION ORAL at 16:22

## 2017-04-19 RX ADMIN — ANTISEPTIC SURGICAL SCRUB: 0.04 SOLUTION TOPICAL at 20:28

## 2017-04-19 RX ADMIN — OXYCODONE HYDROCHLORIDE 5 MG: 5 SOLUTION ORAL at 05:01

## 2017-04-19 RX ADMIN — OXYCODONE HYDROCHLORIDE 5 MG: 5 SOLUTION ORAL at 09:48

## 2017-04-19 RX ADMIN — PANTOPRAZOLE SODIUM 40 MG: 40 TABLET, DELAYED RELEASE ORAL at 20:30

## 2017-04-19 RX ADMIN — ANTISEPTIC SURGICAL SCRUB: 0.04 SOLUTION TOPICAL at 09:36

## 2017-04-19 RX ADMIN — INSULIN ASPART 1 UNITS: 100 INJECTION, SOLUTION INTRAVENOUS; SUBCUTANEOUS at 16:22

## 2017-04-19 RX ADMIN — CHLORHEXIDINE GLUCONATE 15 ML: 1.2 RINSE ORAL at 20:29

## 2017-04-19 RX ADMIN — OXYCODONE HYDROCHLORIDE 5 MG: 5 SOLUTION ORAL at 20:29

## 2017-04-19 RX ADMIN — OXYCODONE HYDROCHLORIDE 5 MG: 5 SOLUTION ORAL at 01:52

## 2017-04-19 ASSESSMENT — VISUAL ACUITY
OU: NORMAL ACUITY

## 2017-04-19 ASSESSMENT — PAIN DESCRIPTION - DESCRIPTORS
DESCRIPTORS: SORE

## 2017-04-19 NOTE — PROGRESS NOTES
Thoracic Surgery Progress Note  Jim Braden  [id number]    Subjective  No acute events overnight. Patient reports pharyngostomy tube continues to be uncomfortable. Tolerating tube feeds, increasing by 10 ml/hr q8h. Multiple BMs yesterday; benefiber added to tube feeds.      Objective  Temp:  [98.4  F (36.9  C)-99.8  F (37.7  C)] 99.8  F (37.7  C)  Heart Rate:  [] 99  Resp:  [12-20] 18  BP: (134-161)/(85-89) 134/85  SpO2:  [93 %-95 %] 95 %    Physical Exam:  Gen: Awake, alert, NAD  HEENT: Cervical crepitus improved, neck incision open and healthy appearing, pharyngostomy functioning well  CVS: RRR  Resp: NLB, CT serosang w/o air leak  Abd: Soft, appropriately TTP, incision c/d/i  Extrem: WWP    Intake/Output Summary (Last 24 hours) at 04/19/17 0847  Last data filed at 04/19/17 0700   Gross per 24 hour   Intake          1695.75 ml   Output             2515 ml   Net          -819.25 ml     UOP: 1700 + unmeasured  CT: 460 mL (320)  Pharyngostomy: 850 mL (450)      Recent Labs  Lab 04/19/17  0603      POTASSIUM 3.4   CHLORIDE 108   DANUTA 8.9   CO2 24   BUN 18   CR 0.64*   *         Recent Labs  Lab 04/18/17  0708   WBC 5.7   HGB 9.8*          Imaging:  XR CHEST 2 VW 4/19/2017 8:22 AM  IMPRESSION:   1. Possible trace left pneumothorax. Large subcutaneous emphysema  overlying the neck and lung apices is unchanged.  2. Left PICC catheter tip curves upward, in an unexpected course. This  is new. The course of the catheter should project downward toward the  SVC. Worrisome for kinking and malpositioning. Recommend repositioning  followed by repeat radiograph.  3. Slight increase in left basilar opacities and stable bilateral  perihilar and basilar opacities, could represent infection or  pulmonary edema with associated atelectasis.  4. Esophageal tube is in slightly less distal position compared to  prior study.      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     Neuro: Scheduled tylenol, dilaudid PCA  Resp: PRN NC, aggressive pulm toilet  Cardio: Holding home lisinopril, MECCA resolved, c/w metoprolol 12.5 mg BID  GI/FEN: NPO, okay to use J for meds, pharyngostomy cares/flushes ordered, bowel reg, advance feeds to goal  - Tip of PICC flipped up on CXR, RN to contact vascular access to evaluate and adjust; will repeat CXR  Renal: Adequate UOP, continue to monitor  Endo: No home meds  ID/heme: Periop zosyn completed, trend hgb/wbc  PPx: Continue LIH gtt, protonix    Patient seen and discussed with josue resident. Will discuss with staff.      - - - - - - - - - - - -  Pastor Loco MD  PGY-1, General Surgery  [facility]  Pager: [phone]

## 2017-04-19 NOTE — PROVIDER NOTIFICATION
PICC malpositioned per x-ray this AM.  Heparin drip stopped as it was running in PICC.  Order for PIV placed but patient refused.  Per PICC nurse, armond to run heparin in PICC despite being malpositioned.  Thoracic surgery resident called, okvalerio with MD to restart heparin in PICC.  Heparin restarted.

## 2017-04-19 NOTE — PLAN OF CARE
"Problem: Goal Outcome Summary  Goal: Goal Outcome Summary  Outcome: No Change  /85 (BP Location: Right arm)  Pulse 71  Temp 98.9  F (37.2  C) (Oral)  Resp 16  Ht 1.803 m (5' 11\")  Wt 82.2 kg (181 lb 3.5 oz)  SpO2 95%  BMI 25.27 kg/m2     Neuro: A&OX4.   Cardiac: Sinus rhythm sustains 70-low 100's. SBPs in the 130's.  Resp: Maintains O2 sats >93% on RA.   GI/: Uses urinal, due to void.  Diet/Appetite: NPO, continuous tube feedings going at 50mL/hr with goal of 70mL/hr. Flush with 30mL q4hrs  Skin: 4 lap sites with liquid bandage. Left chest tube incision UTV.   Access: Left double lumen PICC, heparin at 1,000units/hr and NS TKO  Drains: Right chest tube to water seal with serosang output, j tube, pharyngostomy to LIS with green/brown output and flusing q4hrs with 30mL   Activity: Stand by assist  Pain: scheduled Oxycodone given for soreness in R neck. No PRNs given.     Will continue with plan of care and notify MD of any changes.           "

## 2017-04-19 NOTE — PLAN OF CARE
Problem: Goal Outcome Summary  Goal: Goal Outcome Summary  Outcome: Improving  Afebrile, VSS.  Patient up ambulating in halls.  Tube feeding increased to 60 ml/hr at 1600.  Tolerating well.  Chest tube to water seal, pharyngostomy to LIS.  Pain in mid upper back, some relief with scheduled oxycodone and ice packs.  PICC re positioned today, heparin drip running.  Xa level in for 1930 check.  Transferred to .  Report called to RN.  All medications, supplies, chart and belongings sent with patient.

## 2017-04-19 NOTE — PROGRESS NOTES
Vascular Access Services Notes:    Current Left PICC tip flipped upward in the SVC. PICC flushed rapidly with NS 10 ml/lumen. PICC tip now in the SVC RA junction per fluoroscopy. Katelyn 6B RN, notified that PICC is okay to use.    Time spent with patient - 30 minutes.        DON KirkpatrickN, RN [facility]

## 2017-04-19 NOTE — PLAN OF CARE
Problem: Goal Outcome Summary  Goal: Goal Outcome Summary  PT 6B: Patient ambulates >1000ft without assistive device or UE support on IV pole, SBA needed for safety. Pt also completes step ups x 12 with single railing and SBA. Pt with improving mobility and endurance, no rest breaks needed.   Anticipate discharge home with assist from family as needed when medically appropriate.

## 2017-04-19 NOTE — PLAN OF CARE
Problem: Goal Outcome Summary  Goal: Goal Outcome Summary  Outcome: No Change  Neuro: A&Ox4.   Cardiac: SR. VSS. Hep gtt DC'd per order.   Respiratory: Sating 94% on RA. Fell to 87% but returned to 94% after using IS. CT to water seal and draining large amount with movement. Leaking at site, dressing saturated and changed x1 this evening.  GI/: Adequate urine output. Loose BM X2. MD notified, fiber added to TF.   Diet/appetite: NPO, TF into J tube at 40mL/hr increasing by 10 q8h per order to reach goal of 70mL/hr.   Activity: Standby assist to bathroom, pt prefers to wait until midnight to ambulate d/t crowded hallways. Activity offered and encouraged.   Pain: Along with scheduled Oxy, pt received PRN Oxy, no improvement in pain at pharyngostomy tube site. States pain started when tube was repositioned by Throcic team. IV Dilaudid given, awaiting outcome.   Skin: Intact, no new deficits noted.     R: Continue to flush Pharyngostomy tube q4h per order and monitor site in response unrelieved pain.

## 2017-04-20 ENCOUNTER — APPOINTMENT (OUTPATIENT)
Dept: GENERAL RADIOLOGY | Facility: CLINIC | Age: 57
DRG: 357 | End: 2017-04-20
Attending: STUDENT IN AN ORGANIZED HEALTH CARE EDUCATION/TRAINING PROGRAM
Payer: COMMERCIAL

## 2017-04-20 ENCOUNTER — APPOINTMENT (OUTPATIENT)
Dept: PHYSICAL THERAPY | Facility: CLINIC | Age: 57
DRG: 357 | End: 2017-04-20
Attending: THORACIC SURGERY (CARDIOTHORACIC VASCULAR SURGERY)
Payer: COMMERCIAL

## 2017-04-20 LAB
ANION GAP SERPL CALCULATED.3IONS-SCNC: 8 MMOL/L (ref 3–14)
BUN SERPL-MCNC: 27 MG/DL (ref 7–30)
CALCIUM SERPL-MCNC: 8.8 MG/DL (ref 8.5–10.1)
CHLORIDE SERPL-SCNC: 108 MMOL/L (ref 94–109)
CO2 SERPL-SCNC: 26 MMOL/L (ref 20–32)
CREAT SERPL-MCNC: 0.64 MG/DL (ref 0.66–1.25)
ERYTHROCYTE [DISTWIDTH] IN BLOOD BY AUTOMATED COUNT: 14.8 % (ref 10–15)
GFR SERPL CREATININE-BSD FRML MDRD: ABNORMAL ML/MIN/1.7M2
GLUCOSE BLDC GLUCOMTR-MCNC: 105 MG/DL (ref 70–99)
GLUCOSE BLDC GLUCOMTR-MCNC: 110 MG/DL (ref 70–99)
GLUCOSE BLDC GLUCOMTR-MCNC: 127 MG/DL (ref 70–99)
GLUCOSE BLDC GLUCOMTR-MCNC: 131 MG/DL (ref 70–99)
GLUCOSE BLDC GLUCOMTR-MCNC: 133 MG/DL (ref 70–99)
GLUCOSE BLDC GLUCOMTR-MCNC: 146 MG/DL (ref 70–99)
GLUCOSE SERPL-MCNC: 151 MG/DL (ref 70–99)
HCT VFR BLD AUTO: 31.9 % (ref 40–53)
HGB BLD-MCNC: 10.7 G/DL (ref 13.3–17.7)
LMWH PPP CHRO-ACNC: 0.17 IU/ML
LMWH PPP CHRO-ACNC: 0.21 IU/ML
MCH RBC QN AUTO: 32.2 PG (ref 26.5–33)
MCHC RBC AUTO-ENTMCNC: 33.5 G/DL (ref 31.5–36.5)
MCV RBC AUTO: 96 FL (ref 78–100)
PLATELET # BLD AUTO: 209 10E9/L (ref 150–450)
PLATELET # BLD AUTO: 220 10E9/L (ref 150–450)
POTASSIUM SERPL-SCNC: 3.7 MMOL/L (ref 3.4–5.3)
RBC # BLD AUTO: 3.32 10E12/L (ref 4.4–5.9)
SODIUM SERPL-SCNC: 142 MMOL/L (ref 133–144)
WBC # BLD AUTO: 8.2 10E9/L (ref 4–11)

## 2017-04-20 PROCEDURE — 25000128 H RX IP 250 OP 636: Performed by: PHYSICIAN ASSISTANT

## 2017-04-20 PROCEDURE — 27210437 ZZH NUTRITION PRODUCT SEMIELEM INTERMED LITER

## 2017-04-20 PROCEDURE — 97110 THERAPEUTIC EXERCISES: CPT | Mod: GP

## 2017-04-20 PROCEDURE — 85520 HEPARIN ASSAY: CPT | Performed by: THORACIC SURGERY (CARDIOTHORACIC VASCULAR SURGERY)

## 2017-04-20 PROCEDURE — 85520 HEPARIN ASSAY: CPT | Performed by: SURGERY

## 2017-04-20 PROCEDURE — 36415 COLL VENOUS BLD VENIPUNCTURE: CPT | Performed by: THORACIC SURGERY (CARDIOTHORACIC VASCULAR SURGERY)

## 2017-04-20 PROCEDURE — 25000128 H RX IP 250 OP 636: Performed by: SURGERY

## 2017-04-20 PROCEDURE — 85027 COMPLETE CBC AUTOMATED: CPT | Performed by: STUDENT IN AN ORGANIZED HEALTH CARE EDUCATION/TRAINING PROGRAM

## 2017-04-20 PROCEDURE — 40000802 ZZH SITE CHECK

## 2017-04-20 PROCEDURE — 25000132 ZZH RX MED GY IP 250 OP 250 PS 637: Performed by: THORACIC SURGERY (CARDIOTHORACIC VASCULAR SURGERY)

## 2017-04-20 PROCEDURE — 12000001 ZZH R&B MED SURG/OB UMMC

## 2017-04-20 PROCEDURE — 40000193 ZZH STATISTIC PT WARD VISIT

## 2017-04-20 PROCEDURE — 25000132 ZZH RX MED GY IP 250 OP 250 PS 637: Performed by: PHYSICIAN ASSISTANT

## 2017-04-20 PROCEDURE — 36592 COLLECT BLOOD FROM PICC: CPT | Performed by: SURGERY

## 2017-04-20 PROCEDURE — 85049 AUTOMATED PLATELET COUNT: CPT | Performed by: THORACIC SURGERY (CARDIOTHORACIC VASCULAR SURGERY)

## 2017-04-20 PROCEDURE — 71020 XR CHEST 2 VW: CPT

## 2017-04-20 PROCEDURE — 00000146 ZZHCL STATISTIC GLUCOSE BY METER IP

## 2017-04-20 PROCEDURE — 80048 BASIC METABOLIC PNL TOTAL CA: CPT | Performed by: STUDENT IN AN ORGANIZED HEALTH CARE EDUCATION/TRAINING PROGRAM

## 2017-04-20 PROCEDURE — 97112 NEUROMUSCULAR REEDUCATION: CPT | Mod: GP

## 2017-04-20 RX ADMIN — OXYCODONE HYDROCHLORIDE 5 MG: 5 SOLUTION ORAL at 21:09

## 2017-04-20 RX ADMIN — METOPROLOL TARTRATE 12.5 MG: 100 TABLET ORAL at 21:07

## 2017-04-20 RX ADMIN — ANTISEPTIC SURGICAL SCRUB: 0.04 SOLUTION TOPICAL at 21:06

## 2017-04-20 RX ADMIN — HYDROMORPHONE HYDROCHLORIDE 0.2 MG: 10 INJECTION, SOLUTION INTRAMUSCULAR; INTRAVENOUS; SUBCUTANEOUS at 23:31

## 2017-04-20 RX ADMIN — OXYCODONE HYDROCHLORIDE 5 MG: 5 SOLUTION ORAL at 16:27

## 2017-04-20 RX ADMIN — OXYCODONE HYDROCHLORIDE 5 MG: 5 SOLUTION ORAL at 03:53

## 2017-04-20 RX ADMIN — PANTOPRAZOLE SODIUM 40 MG: 40 TABLET, DELAYED RELEASE ORAL at 21:06

## 2017-04-20 RX ADMIN — OXYCODONE HYDROCHLORIDE 5 MG: 5 SOLUTION ORAL at 08:35

## 2017-04-20 RX ADMIN — OXYCODONE HYDROCHLORIDE 5 MG: 5 SOLUTION ORAL at 12:51

## 2017-04-20 RX ADMIN — OXYCODONE HYDROCHLORIDE 5 MG: 5 SOLUTION ORAL at 00:31

## 2017-04-20 RX ADMIN — MULTIVITAMIN 15 ML: LIQUID ORAL at 10:21

## 2017-04-20 RX ADMIN — ANTISEPTIC SURGICAL SCRUB: 0.04 SOLUTION TOPICAL at 09:17

## 2017-04-20 RX ADMIN — METOPROLOL TARTRATE 12.5 MG: 100 TABLET ORAL at 10:22

## 2017-04-20 RX ADMIN — OXYCODONE HYDROCHLORIDE 5 MG: 5 SOLUTION ORAL at 18:20

## 2017-04-20 RX ADMIN — HEPARIN SODIUM 1300 UNITS/HR: 10000 INJECTION, SOLUTION INTRAVENOUS at 03:52

## 2017-04-20 RX ADMIN — PANTOPRAZOLE SODIUM 40 MG: 40 TABLET, DELAYED RELEASE ORAL at 10:25

## 2017-04-20 RX ADMIN — INSULIN ASPART 1 UNITS: 100 INJECTION, SOLUTION INTRAVENOUS; SUBCUTANEOUS at 00:42

## 2017-04-20 RX ADMIN — ACETAMINOPHEN 650 MG: 325 SOLUTION ORAL at 10:21

## 2017-04-20 ASSESSMENT — VISUAL ACUITY: OU: NORMAL ACUITY

## 2017-04-20 NOTE — PLAN OF CARE
"Problem: Individualization  Goal: Patient Preferences  Outcome: Improving  /66 (BP Location: Right arm)  Pulse 71  Temp 98.6  F (37  C) (Oral)  Resp 16  Ht 1.803 m (5' 11\")  Wt 82.2 kg (181 lb 3.5 oz)  SpO2 93%  BMI 25.27 kg/m2     Afebrile, VSS. Patient up ambulating in halls wife. Tube feeding increased to 60 ml/hr at 1600, due to increase to 70 ml/hr at midnight. Tolerating well. Chest tube to water seal, pulling sersang 50 ml out this shift.  Pharyngostomy to LIS with 150 ml clear yellow drainage. Pain in mid upper back, some relief with scheduled oxycodone and hot pack. Continuous eparin drip changed rate to 1300 unit/hr, bolus 4100 units given per order.  Next heparin 10 a draw at 0430.  Continue POC.             "

## 2017-04-20 NOTE — PROGRESS NOTES
Thoracic Surgery Progress Note  Jim Braden  [id number]    Subjective  No acute events overnight. Per chart review, patient with some upper back pain yesterday, responsive to oxycodone. PICC was repositioned yesterday. Tube feeds advanced to goal overnight, tolerating well. Small BM yesterday. Ambulating.      Objective  Temp:  [96.7  F (35.9  C)-99.4  F (37.4  C)] 97  F (36.1  C)  Pulse:  [102] 102  Heart Rate:  [] 95  Resp:  [16-17] 16  BP: (122-139)/(66-83) 135/74  SpO2:  [93 %-95 %] 94 %    Physical Exam:  Gen: Awake, alert, NAD  HEENT: Cervical crepitus improved, neck incision open and healthy appearing, pharyngostomy functioning well  CVS: RRR  Resp: NLB; CT tidaling, serosang w/o air leak  Abd: Soft, appropriately TTP, incision c/d/i  Extrem: WWP    Intake/Output Summary (Last 24 hours) at 04/20/17 0845  Last data filed at 04/20/17 0722   Gross per 24 hour   Intake          1853.67 ml   Output             1990 ml   Net          -136.33 ml     UOP: 600 + spontaneous  BM: none yesterday  CT R: 520 mL (460)      Recent Labs  Lab 04/20/17  0727      POTASSIUM 3.7   CHLORIDE 108   DANUTA 8.8   CO2 26   BUN 27   CR 0.64*   *       Recent Labs  Lab 04/20/17  0727   WBC 8.2   HGB 10.7*     209       Imaging:  AM CXR pending.      Assessment & Plan  56 year old male with history of LE DVT on lovenox, neoadjuvant chemo, s/p thoracoscopic/laparoscpic esophagectomy with cervical anastomosis, J tube, pharyngostomy tube placement 4/14 for lower esophageal adenocarcinoma.      Neuro: Scheduled oxycodone and Tylenol  Resp: PRN NC, aggressive pulm toilet  Cardio: Holding home lisinopril, MECCA resolved, c/w metoprolol 12.5 mg BID  GI/FEN: NPO, okay to use J for meds, pharyngostomy cares/flushes ordered, tube feeds to goal  - Bowel reg: Dulcolax suppository, Miralax  - Swallow test tomorrow  Renal: Adequate UOP, continue to monitor  Endo: No home meds  ID/heme: Periop zosyn completed, trend hgb/wbc  PPx:  Continue PAVAN landaverde, protonix     Discussed with staff      - - - - - - - - - - - -  Pastor Loco MD  PGY-1, General Surgery  [facility]  Pager: [phone]

## 2017-04-20 NOTE — PLAN OF CARE
Problem: Goal Outcome Summary  Goal: Goal Outcome Summary  Outcome: No Change  A&Ox4, VSS on RA. C/o pain in back that was well managed with scheduled Oxycodone. Denied nausea and rested comfortably between cares. Chest tube to water seal with serosang output, leaking at site; dressing changed x1. Pharyngostomy tube patent to LIS with moderate amounts of clear, yellow, drainage. J tube in place with TF infusing @ goal rate of 70 mL/hr; pt tolerating TF but states he feels slightly bloated. Chest incision w/ liquid bandage that is c/d/i. PICC patent infusing continuous heparin drip @ 1300 Units/hr. BG's 146 & 127 overnight. Up with SBA, Voiding spontaneously. Continue to monitor and follow POC.

## 2017-04-20 NOTE — PROGRESS NOTES
VSS, 0800 , and 1200 , no insulin given.  Pt has productive cough and difficulty using IS.  Had small, loose BM at 800, laxatives held.  Good urine output.  Chest tube dressing changed at 1130, small amount of serosanguinous drainage.  All incision sites CDI.  Tolerated tube feeding well.  Pain in back and neck controlled with tylenol and oxycodone. Heparin drip at 1300 units/hr. Pt ambulated around unit and tolerated well.

## 2017-04-20 NOTE — PLAN OF CARE
Cared for pt from [id number].  VSS. Alert and oriented x4. Receiving scheduled and prn oxy. Pt worked with PT. Up with SBA. Hep drip running at 13ml/hr via PICC. 10a check in a.m.  Chest tube to water seal. Serous output. Pharyngostomy to low intermittent suction, putting out bile-colored output. TF running at 70 ml/hr via J-tube. NPO. BS checked q 4 hr. Continue plan of care.

## 2017-04-21 ENCOUNTER — APPOINTMENT (OUTPATIENT)
Dept: SPEECH THERAPY | Facility: CLINIC | Age: 57
DRG: 357 | End: 2017-04-21
Attending: PHYSICIAN ASSISTANT
Payer: COMMERCIAL

## 2017-04-21 ENCOUNTER — APPOINTMENT (OUTPATIENT)
Dept: GENERAL RADIOLOGY | Facility: CLINIC | Age: 57
DRG: 357 | End: 2017-04-21
Attending: PHYSICIAN ASSISTANT
Payer: COMMERCIAL

## 2017-04-21 ENCOUNTER — APPOINTMENT (OUTPATIENT)
Dept: GENERAL RADIOLOGY | Facility: CLINIC | Age: 57
DRG: 357 | End: 2017-04-21
Attending: STUDENT IN AN ORGANIZED HEALTH CARE EDUCATION/TRAINING PROGRAM
Payer: COMMERCIAL

## 2017-04-21 LAB
GLUCOSE BLDC GLUCOMTR-MCNC: 114 MG/DL (ref 70–99)
GLUCOSE BLDC GLUCOMTR-MCNC: 119 MG/DL (ref 70–99)
GLUCOSE BLDC GLUCOMTR-MCNC: 125 MG/DL (ref 70–99)
GLUCOSE BLDC GLUCOMTR-MCNC: 130 MG/DL (ref 70–99)
GLUCOSE BLDC GLUCOMTR-MCNC: 136 MG/DL (ref 70–99)
GLUCOSE BLDC GLUCOMTR-MCNC: 154 MG/DL (ref 70–99)
LMWH PPP CHRO-ACNC: 0.17 IU/ML

## 2017-04-21 PROCEDURE — 71020 XR CHEST 2 VW: CPT

## 2017-04-21 PROCEDURE — 25000132 ZZH RX MED GY IP 250 OP 250 PS 637: Performed by: PHYSICIAN ASSISTANT

## 2017-04-21 PROCEDURE — 25000132 ZZH RX MED GY IP 250 OP 250 PS 637: Performed by: SURGERY

## 2017-04-21 PROCEDURE — 92611 MOTION FLUOROSCOPY/SWALLOW: CPT | Mod: GN | Performed by: SPEECH-LANGUAGE PATHOLOGIST

## 2017-04-21 PROCEDURE — 25000128 H RX IP 250 OP 636: Performed by: SURGERY

## 2017-04-21 PROCEDURE — 36415 COLL VENOUS BLD VENIPUNCTURE: CPT | Performed by: SURGERY

## 2017-04-21 PROCEDURE — 12000001 ZZH R&B MED SURG/OB UMMC

## 2017-04-21 PROCEDURE — 25000132 ZZH RX MED GY IP 250 OP 250 PS 637: Performed by: THORACIC SURGERY (CARDIOTHORACIC VASCULAR SURGERY)

## 2017-04-21 PROCEDURE — 25000132 ZZH RX MED GY IP 250 OP 250 PS 637: Performed by: STUDENT IN AN ORGANIZED HEALTH CARE EDUCATION/TRAINING PROGRAM

## 2017-04-21 PROCEDURE — 40000225 ZZH STATISTIC SLP WARD VISIT: Performed by: SPEECH-LANGUAGE PATHOLOGIST

## 2017-04-21 PROCEDURE — 00000146 ZZHCL STATISTIC GLUCOSE BY METER IP

## 2017-04-21 PROCEDURE — 40000802 ZZH SITE CHECK

## 2017-04-21 PROCEDURE — 74230 X-RAY XM SWLNG FUNCJ C+: CPT

## 2017-04-21 PROCEDURE — 85520 HEPARIN ASSAY: CPT | Performed by: SURGERY

## 2017-04-21 RX ORDER — BARIUM SULFATE 400 MG/ML
SUSPENSION ORAL ONCE
Status: DISCONTINUED | OUTPATIENT
Start: 2017-04-21 | End: 2017-04-21 | Stop reason: CLARIF

## 2017-04-21 RX ADMIN — PANTOPRAZOLE SODIUM 40 MG: 40 TABLET, DELAYED RELEASE ORAL at 20:50

## 2017-04-21 RX ADMIN — OXYCODONE HYDROCHLORIDE 5 MG: 5 SOLUTION ORAL at 20:50

## 2017-04-21 RX ADMIN — OXYCODONE HYDROCHLORIDE 5 MG: 5 SOLUTION ORAL at 12:09

## 2017-04-21 RX ADMIN — OXYCODONE HYDROCHLORIDE 5 MG: 5 SOLUTION ORAL at 17:00

## 2017-04-21 RX ADMIN — OXYCODONE HYDROCHLORIDE 5 MG: 5 SOLUTION ORAL at 01:21

## 2017-04-21 RX ADMIN — OXYCODONE HYDROCHLORIDE 5 MG: 5 SOLUTION ORAL at 05:28

## 2017-04-21 RX ADMIN — MULTIVITAMIN 15 ML: LIQUID ORAL at 07:36

## 2017-04-21 RX ADMIN — OXYCODONE HYDROCHLORIDE 5 MG: 5 SOLUTION ORAL at 20:51

## 2017-04-21 RX ADMIN — PANTOPRAZOLE SODIUM 40 MG: 40 TABLET, DELAYED RELEASE ORAL at 07:36

## 2017-04-21 RX ADMIN — INSULIN ASPART 1 UNITS: 100 INJECTION, SOLUTION INTRAVENOUS; SUBCUTANEOUS at 20:49

## 2017-04-21 RX ADMIN — ANTISEPTIC SURGICAL SCRUB: 0.04 SOLUTION TOPICAL at 20:48

## 2017-04-21 RX ADMIN — OXYCODONE HYDROCHLORIDE 5 MG: 5 SOLUTION ORAL at 17:01

## 2017-04-21 RX ADMIN — Medication 1 PACKET: at 20:57

## 2017-04-21 RX ADMIN — METOPROLOL TARTRATE 12.5 MG: 100 TABLET ORAL at 07:36

## 2017-04-21 RX ADMIN — HEPARIN SODIUM 1300 UNITS/HR: 10000 INJECTION, SOLUTION INTRAVENOUS at 20:08

## 2017-04-21 RX ADMIN — OXYCODONE HYDROCHLORIDE 5 MG: 5 SOLUTION ORAL at 07:35

## 2017-04-21 RX ADMIN — METOPROLOL TARTRATE 12.5 MG: 100 TABLET ORAL at 20:50

## 2017-04-21 RX ADMIN — ANTISEPTIC SURGICAL SCRUB: 0.04 SOLUTION TOPICAL at 08:15

## 2017-04-21 RX ADMIN — CHLORHEXIDINE GLUCONATE 15 ML: 1.2 RINSE ORAL at 20:48

## 2017-04-21 ASSESSMENT — VISUAL ACUITY
OU: NORMAL ACUITY
OU: NORMAL ACUITY

## 2017-04-21 NOTE — PLAN OF CARE
Problem: Goal Outcome Summary  Goal: Goal Outcome Summary  PT: Pt continues to move around well, will decrease frequency to 3x/week to progress higher level strength, balance. Pt is safe to ocntinue walking around floor as has been doing. Pt amb 1000'+ with no AD. Pt able to demo standing/walking dynamic balance ex with PT with some light single UE assist but doing well overall. Recommend discharge to home with A as needed.

## 2017-04-21 NOTE — PLAN OF CARE
Problem: Goal Outcome Summary  Goal: Goal Outcome Summary  Video Swallow Study completed per orders. Pt demonstrates moderately severe oropharyngeal dypsphagia as characterized by tanisha aspiration on sips of thin liquid prior to the initiation of the swallow. Pt demonstrated spontaneous cough only after several episodes of aspiration occured. Attempted supraglottic swallow strategy however pt still aspirated material into his trachea. Pt unable to participate in esophagram as he would be unable to swallow large amounts of contrast without aspirating. Pt would benefit from speech therapy to address pharyngeal timing of swallow and strength.

## 2017-04-21 NOTE — PROGRESS NOTES
04/21/17 1200   General Information   Onset Date 04/14/17   Start of Care Date 04/21/17   Patient Profile Review/OT: Additional Occupational Profile Info See Profile for full history and prior level of function   Patient/Family Goals Statement Pt stated strong desire to drink something.   Swallowing Evaluation Videofluoroscopic evaluation   Behaviorial Observations WFL (within functional limits)   Mode of current nutrition (Jtube)   Comments Orders received for Video Swallow Study. 56 year old male with history of LE DVT on lovenox, neoadjuvant chemo, s/p thoracoscopic/laparoscpic esophagectomy with cervical anastomosis, J tube, pharyngostomy tube placement 4/14 for lower esophageal adenocarcinoma. Pt now with subcutaneous emphysema.    Clinical Swallow Evaluation   Oral Musculature generally intact   Structural Abnormalities other (see comments)  (Subcutaneous emphysema)   Dentition present and adequate   Mucosal Quality dry   Mandibular Strength and Mobility intact   Oral Labial Strength and Mobility WFL   Lingual Strength and Mobility WFL   Velar Elevation intact   Buccal Strength and Mobility intact   Laryngeal Function Cough;Throat clear;Swallow;Voicing initiated   VFSS Eval: Radiology   Radiologist Resident   Views Taken left lateral   Physical Location of Procedure [facility] radiology room 4   VFSS Eval: Thin Liquid Texture Trial   Mode of Presentation, Thin Liquid cup;self-fed   Order of Presentation 1,2,3   Preparatory Phase WFL   Oral Phase, Thin Liquid Premature pharyngeal entry   Pharyngeal Phase, Thin Liquid Delayed swallow reflex;Residue in valleculae   Rosenbek's Penetration Aspiration Scale: Thin Liquid Trial Results 7 - contrast passes glottis, visible subglottic residue remains despite patient's response (aspiration)   Response to Aspiration unproductive reflexive involuntary cough/throat clear   Diagnostic Statement Pt demonstrated aspiration prior to the initiation of the swallow and only  demonstrated cough after a few episodes of aspiraiton.    General Therapy Interventions   Planned Therapy Interventions Dysphagia Treatment   Dysphagia treatment Oropharyngeal exercise training   Swallow Eval: Clinical Impressions   Skilled Criteria for Therapy Intervention Skilled criteria met.  Treatment indicated.   Functional Assessment Scale (FAS) 2   Treatment Diagnosis Moderately severe oropharyngeal dysphagia   Diet texture recommendations NPO   Therapy Frequency 3 times/wk   Predicted Duration of Therapy Intervention (days/wks) 2 weeks   Anticipated Discharge Disposition home w/ assist   Risks and Benefits of Treatment have been explained. Yes   Patient, family and/or staff in agreement with Plan of Care Yes   Clinical Impression Comments Video Swallow Study completed per orders. Pt demonstrates moderately severe oropharyngeal dypsphagia as characterized by tanisha aspiration on sips of thin liquid prior to the initiation of the swallow. Pt demonstrated spontaneous cough only after several episodes of aspiration occured. Attempted supraglottic swallow strategy however pt still aspirated material into his trachea. Pt unable to participate in esophagram as he would be unable to swallow large amounts of contrast without aspirating. Pt would benefit from speech therapy to address pharyngeal timing of swallow and strength.    Total Evaluation Time   Total Evaluation Time (Minutes) 25

## 2017-04-21 NOTE — PROGRESS NOTES
Thoracic Surgery Progress Note  Jim Braden  [id number]    Subjective  No acute events overnight. Patient complains of pain and swelling in neck, especially on right side. Ambulating. Having BMs.      Objective  Temp:  [97.2  F (36.2  C)-98.3  F (36.8  C)] 97.6  F (36.4  C)  Pulse:  [83-94] 94  Heart Rate:  [90] 90  Resp:  [16-18] 18  BP: (112-151)/(63-83) 151/83  SpO2:  [95 %-97 %] 97 %    Physical Exam:  Gen: Awake, alert, NAD  HEENT: Persistent cervical crepitus, particularly on R, seems to be spreading cranially; neck incision open and healthy appearing, pharyngostomy functioning well  CVS: RRR  Resp: NLB; CT tidaling, serosang w/o air leak  Abd: Soft, appropriately TTP, incision c/d/i  Extrem: WWP    Intake/Output Summary (Last 24 hours) at 04/21/17 0844  Last data filed at 04/21/17 0600   Gross per 24 hour   Intake              105 ml   Output             1175 ml   Net            -1070 ml     UOP: 1440 mL  CT: 225 (2 shifts charted)      Recent Labs  Lab 04/20/17  0727      POTASSIUM 3.7   CHLORIDE 108   DANUTA 8.8   CO2 26   BUN 27   CR 0.64*   *         Recent Labs  Lab 04/20/17  0727   WBC 8.2   HGB 10.7*     209       Imaging:  AM CXR with final read pending      Assessment & Plan  56 year old male with history of LE DVT on lovenox, neoadjuvant chemo, s/p thoracoscopic/laparoscpic esophagectomy with cervical anastomosis, J tube, pharyngostomy tube placement 4/14 for lower esophageal adenocarcinoma.      Neuro: Scheduled oxycodone and Tylenol  Resp: PRN NC, aggressive pulm toilet  Cardio: Holding home lisinopril, MECCA resolved, c/w metoprolol 12.5 mg BID  GI/FEN: NPO, okay to use J for meds, pharyngostomy cares/flushes ordered, tube feeds to goal  - Bowel reg: Dulcolax suppository, Miralax  - Swallow test with esophagram today  - Cycle tube feeds  Renal: Adequate UOP, continue to monitor  Endo: No home meds  ID/heme: Periop zosyn completed, trend hgb/wbc  PPx: Continue LIH gtt,  protonix      Discussed with staff.      - - - - - - - - - - - -  Pastor Loco MD  PGY-1, General Surgery  [facility]  Pager: [phone]

## 2017-04-21 NOTE — PLAN OF CARE
Problem: Discharge Planning  Goal: Discharge Planning (Adult, OB, Behavioral, Peds)  04/21/17 0919     Alejandra Fang-Registered Nurse (Nursing)  Patient and family attended J-tube class. Wife and kids RD correctly on model with site care, flushing, medication administration and use of Kem infusion pump. Received written material related to all content taught. Wife asked many relative questions. Answered all teach-back questions appropriately. States she will try to practice skills on pt. While on 7B

## 2017-04-21 NOTE — PROGRESS NOTES
Clinical Nutrition Brief    Will order transition to overnoc/cyclic TF regimen as follows: Starting @ 70 mL/hr, rec increase rate by 10 mL q 4 hrs as tolerated until goal of 105 mL/hr is achieved.    Impact Peptide for 16 hours (4pm-8am) @ goal 105 ml/hr (1680 ml/day) to provide 2520 kcals, 158 g PRO, 1294 ml free H2O, 108 g Fat (50% from MCTs), 235 g CHO and no Fiber daily. Dosing wt 84kg.       Water flushes per providers pending fluid status. To meet full estimated hydration needs, would rec provide an additional 1200 mL/day water in flush form (75ml an hour x 16 hours)     Ongoing monitoring of TF tolerance and adjustment as needed. Would recommend not exceeding 105ml as goal rate. Will readjust if/when pt diet advances.     Violeta Spencer, RD, MS, LD

## 2017-04-21 NOTE — PLAN OF CARE
Problem: Individualization  Goal: Patient Preferences  Outcome: No Change  RN - VSS. Pt pain controlled with ordered oxycodone via J-tube. Denies SOB. Pt c/o increase neck/facial swelling - seemingly related to pt's pharyngostomy tube to LIS. Swelling decreased when suction removed. Providers made aware - request tube remains to suction regardless. J-tube tube feeds beginning transition to 16 hour cycled tube feeds - Currently at 80ml/hr - due for increase to 90ml/hr at 3:30 - and goal rate of 105ml/hr at 7:30. Chest tube remains to water seal - dressing intact - moderate serosanguinous output noted. Heparin gtt remains at 1300u/hr via PICC line - 10a therapeutic. Pt up ad fabi. Voiding adequately. Pt noting bowel movements and declining ordered bowel medications - no witnessed bowel movement today. Pt frustrated with results from swallow eval - Will be NPO throughout the weekend.

## 2017-04-21 NOTE — PLAN OF CARE
"Problem: Goal Outcome Summary  Goal: Goal Outcome Summary  Outcome: Improving  /68 (BP Location: Right arm)  Pulse 83  Temp 98.3  F (36.8  C) (Oral)  Resp 18  Ht 1.803 m (5' 11\")  Wt 82.2 kg (181 lb 3.5 oz)  SpO2 95%  BMI 25.27 kg/m2  Patient afebrile, VSS. Patient A&Ox4, Sats 95% on RA. Patient C/o neck and back pain  that was well managed with scheduled Oxycodone. Patient denied nausea . Patients Chest tube to water seal with serosang output, leaking at site; dressing changed x1. Pharyngostomy tube patent to LIS with moderate amounts of clear, yellow, drainage. J tube in place with TF infusing @ goal rate of 70 mL/hr; pt tolerating TF . Chest incision w/ liquid bandage that is c/d/i. PICC patent infusing continuous heparin drip @ 1300 Units/hr. Patients BG's range under 140 and no coverage needed  overnight. Up with SBA, Voiding spontaneously.Patientt appears to rest between care,  Continue to monitor and follow POC.          "

## 2017-04-22 ENCOUNTER — APPOINTMENT (OUTPATIENT)
Dept: GENERAL RADIOLOGY | Facility: CLINIC | Age: 57
DRG: 357 | End: 2017-04-22
Attending: STUDENT IN AN ORGANIZED HEALTH CARE EDUCATION/TRAINING PROGRAM
Payer: COMMERCIAL

## 2017-04-22 LAB
ANION GAP SERPL CALCULATED.3IONS-SCNC: 8 MMOL/L (ref 3–14)
BUN SERPL-MCNC: 35 MG/DL (ref 7–30)
CALCIUM SERPL-MCNC: 9.2 MG/DL (ref 8.5–10.1)
CHLORIDE SERPL-SCNC: 107 MMOL/L (ref 94–109)
CO2 SERPL-SCNC: 26 MMOL/L (ref 20–32)
CREAT SERPL-MCNC: 0.61 MG/DL (ref 0.66–1.25)
ERYTHROCYTE [DISTWIDTH] IN BLOOD BY AUTOMATED COUNT: 15 % (ref 10–15)
GFR SERPL CREATININE-BSD FRML MDRD: ABNORMAL ML/MIN/1.7M2
GLUCOSE BLDC GLUCOMTR-MCNC: 113 MG/DL (ref 70–99)
GLUCOSE BLDC GLUCOMTR-MCNC: 118 MG/DL (ref 70–99)
GLUCOSE BLDC GLUCOMTR-MCNC: 119 MG/DL (ref 70–99)
GLUCOSE BLDC GLUCOMTR-MCNC: 129 MG/DL (ref 70–99)
GLUCOSE BLDC GLUCOMTR-MCNC: 137 MG/DL (ref 70–99)
GLUCOSE BLDC GLUCOMTR-MCNC: 89 MG/DL (ref 70–99)
GLUCOSE SERPL-MCNC: 118 MG/DL (ref 70–99)
HCT VFR BLD AUTO: 34.2 % (ref 40–53)
HGB BLD-MCNC: 11.1 G/DL (ref 13.3–17.7)
LACTATE BLD-SCNC: 0.7 MMOL/L (ref 0.7–2.1)
LMWH PPP CHRO-ACNC: 0.21 IU/ML
LMWH PPP CHRO-ACNC: NORMAL IU/ML
MAGNESIUM SERPL-MCNC: 2.6 MG/DL (ref 1.6–2.3)
MCH RBC QN AUTO: 31.9 PG (ref 26.5–33)
MCHC RBC AUTO-ENTMCNC: 32.5 G/DL (ref 31.5–36.5)
MCV RBC AUTO: 98 FL (ref 78–100)
PLATELET # BLD AUTO: 357 10E9/L (ref 150–450)
POTASSIUM SERPL-SCNC: 4.4 MMOL/L (ref 3.4–5.3)
RBC # BLD AUTO: 3.48 10E12/L (ref 4.4–5.9)
SODIUM SERPL-SCNC: 142 MMOL/L (ref 133–144)
WBC # BLD AUTO: 12.9 10E9/L (ref 4–11)

## 2017-04-22 PROCEDURE — 83735 ASSAY OF MAGNESIUM: CPT | Performed by: INTERNAL MEDICINE

## 2017-04-22 PROCEDURE — 85520 HEPARIN ASSAY: CPT | Performed by: SURGERY

## 2017-04-22 PROCEDURE — 25000132 ZZH RX MED GY IP 250 OP 250 PS 637: Performed by: PHYSICIAN ASSISTANT

## 2017-04-22 PROCEDURE — 00000146 ZZHCL STATISTIC GLUCOSE BY METER IP

## 2017-04-22 PROCEDURE — 25000132 ZZH RX MED GY IP 250 OP 250 PS 637: Performed by: THORACIC SURGERY (CARDIOTHORACIC VASCULAR SURGERY)

## 2017-04-22 PROCEDURE — 40000558 ZZH STATISTIC CVC DRESSING CHANGE

## 2017-04-22 PROCEDURE — 71020 XR CHEST 2 VW: CPT

## 2017-04-22 PROCEDURE — 25000132 ZZH RX MED GY IP 250 OP 250 PS 637: Performed by: SURGERY

## 2017-04-22 PROCEDURE — 36415 COLL VENOUS BLD VENIPUNCTURE: CPT | Performed by: THORACIC SURGERY (CARDIOTHORACIC VASCULAR SURGERY)

## 2017-04-22 PROCEDURE — 27210437 ZZH NUTRITION PRODUCT SEMIELEM INTERMED LITER

## 2017-04-22 PROCEDURE — 80048 BASIC METABOLIC PNL TOTAL CA: CPT | Performed by: INTERNAL MEDICINE

## 2017-04-22 PROCEDURE — 85027 COMPLETE CBC AUTOMATED: CPT | Performed by: INTERNAL MEDICINE

## 2017-04-22 PROCEDURE — 85520 HEPARIN ASSAY: CPT | Performed by: THORACIC SURGERY (CARDIOTHORACIC VASCULAR SURGERY)

## 2017-04-22 PROCEDURE — 25000125 ZZHC RX 250: Performed by: STUDENT IN AN ORGANIZED HEALTH CARE EDUCATION/TRAINING PROGRAM

## 2017-04-22 PROCEDURE — 83605 ASSAY OF LACTIC ACID: CPT | Performed by: THORACIC SURGERY (CARDIOTHORACIC VASCULAR SURGERY)

## 2017-04-22 PROCEDURE — 94640 AIRWAY INHALATION TREATMENT: CPT | Performed by: OPTOMETRIST

## 2017-04-22 PROCEDURE — 40000275 ZZH STATISTIC RCP TIME EA 10 MIN: Performed by: OPTOMETRIST

## 2017-04-22 PROCEDURE — 25000128 H RX IP 250 OP 636: Performed by: PHYSICIAN ASSISTANT

## 2017-04-22 PROCEDURE — 36592 COLLECT BLOOD FROM PICC: CPT | Performed by: SURGERY

## 2017-04-22 PROCEDURE — 12000001 ZZH R&B MED SURG/OB UMMC

## 2017-04-22 RX ADMIN — HYDROMORPHONE HYDROCHLORIDE 0.2 MG: 10 INJECTION, SOLUTION INTRAMUSCULAR; INTRAVENOUS; SUBCUTANEOUS at 05:12

## 2017-04-22 RX ADMIN — PANTOPRAZOLE SODIUM 40 MG: 40 TABLET, DELAYED RELEASE ORAL at 20:06

## 2017-04-22 RX ADMIN — TRAZODONE HYDROCHLORIDE 25 MG: 150 TABLET ORAL at 20:57

## 2017-04-22 RX ADMIN — PANTOPRAZOLE SODIUM 40 MG: 40 TABLET, DELAYED RELEASE ORAL at 08:19

## 2017-04-22 RX ADMIN — METOPROLOL TARTRATE 12.5 MG: 100 TABLET ORAL at 20:05

## 2017-04-22 RX ADMIN — ACETAMINOPHEN 650 MG: 325 SOLUTION ORAL at 08:18

## 2017-04-22 RX ADMIN — MULTIVITAMIN 15 ML: LIQUID ORAL at 08:19

## 2017-04-22 RX ADMIN — ACETAMINOPHEN 650 MG: 325 SOLUTION ORAL at 12:13

## 2017-04-22 RX ADMIN — ANTISEPTIC SURGICAL SCRUB: 0.04 SOLUTION TOPICAL at 20:07

## 2017-04-22 RX ADMIN — CHLORHEXIDINE GLUCONATE 15 ML: 1.2 RINSE ORAL at 08:17

## 2017-04-22 RX ADMIN — OXYCODONE HYDROCHLORIDE 5 MG: 5 SOLUTION ORAL at 20:06

## 2017-04-22 RX ADMIN — SODIUM CHLORIDE, PRESERVATIVE FREE 10 ML: 5 INJECTION INTRAVENOUS at 07:10

## 2017-04-22 RX ADMIN — ANTISEPTIC SURGICAL SCRUB: 0.04 SOLUTION TOPICAL at 08:15

## 2017-04-22 RX ADMIN — CHLORHEXIDINE GLUCONATE 15 ML: 1.2 RINSE ORAL at 20:06

## 2017-04-22 RX ADMIN — OXYCODONE HYDROCHLORIDE 5 MG: 5 SOLUTION ORAL at 00:06

## 2017-04-22 RX ADMIN — IPRATROPIUM BROMIDE AND ALBUTEROL SULFATE 3 ML: .5; 3 SOLUTION RESPIRATORY (INHALATION) at 01:33

## 2017-04-22 RX ADMIN — OXYCODONE HYDROCHLORIDE 5 MG: 5 SOLUTION ORAL at 12:13

## 2017-04-22 RX ADMIN — ACETAMINOPHEN 650 MG: 325 SOLUTION ORAL at 02:58

## 2017-04-22 RX ADMIN — ACETAMINOPHEN 650 MG: 325 SOLUTION ORAL at 15:47

## 2017-04-22 RX ADMIN — METOPROLOL TARTRATE 12.5 MG: 100 TABLET ORAL at 08:19

## 2017-04-22 RX ADMIN — TRAZODONE HYDROCHLORIDE 25 MG: 150 TABLET ORAL at 22:17

## 2017-04-22 RX ADMIN — OXYCODONE HYDROCHLORIDE 5 MG: 5 SOLUTION ORAL at 15:48

## 2017-04-22 RX ADMIN — OXYCODONE HYDROCHLORIDE 5 MG: 5 SOLUTION ORAL at 04:00

## 2017-04-22 RX ADMIN — OXYCODONE HYDROCHLORIDE 5 MG: 5 SOLUTION ORAL at 08:18

## 2017-04-22 NOTE — PLAN OF CARE
"Problem: Individualization  Goal: Patient Preferences  Outcome: No Change  /76 (BP Location: Right arm)  Pulse 99  Temp 96.1  F (35.6  C) (Oral)  Resp 18  Ht 1.803 m (5' 11\")  Wt 82.2 kg (181 lb 3.5 oz)  SpO2 96%  BMI 25.27 kg/m2     AVSS. Pt states pain is tolerated with scheduled and prn oxycodone.  Denies SOB.  Neck/facial remains swelling, MD aware. Pharyngostomy tube to LIS with 250 ml brown drainage out this shift. CT to water seal, pulling 100 cc serosang out during shift.  CT site leaking, dressing changed x2.  Continuous heparin gtt at 1300 units/hr via PICC.  Patient ambulated in hallway.  Voiding adequately.  TF at goal rate: 105 ml/hr.  Continue POC.                "

## 2017-04-22 NOTE — PLAN OF CARE
Problem: Goal Outcome Summary  Goal: Goal Outcome Summary  PT 7B: CANCEL. Pt declining therapy this PM, stating that he has already been on 3 walks today. Pt encouraged to continue with activity, will check-back per POC to progress higher level balance and endurance.

## 2017-04-22 NOTE — PLAN OF CARE
Problem: Goal Outcome Summary  Goal: Goal Outcome Summary  Outcome: No Change  VSS. Neck and face remain swollen, crepitus felt to R chest. Pt c/o wheezing, mostly in upper airway. Prn neb given with no relief. Pt very anxious and awake all night, refused to have P tube to LIS for portions of the night. 150ml brown output from p tube. TF at 105ml/hr (goal) to J tube, meds thru J tube. CT to R with 100ml serosang output, to water seal. Incisions CDI BINDU. Heparin gtt at 1300, 10a check this am. Up ind. Voiding per urinal. Pt very anxious to hear plan of care today.

## 2017-04-22 NOTE — PROGRESS NOTES
Focus:  Status  D:  Monitoring status  I:   Vitals taken       amb in halls with SBA       Informed pt to remain NPO  Because pt continued to say he was hungry and that he was not getting enough TF. He said he wanted to the Dietitian to increase his TF due to his high metabolism.       1600 TF started will run until 0800, pt tolerated the 105 cc/h rate        Got pain med q4h and requested the extra 5 mg PRN dose as well, said the 5 mg doesn't help with pain control.        0930 pt triggered the Lactic Acid protocol, vitals taken times 2 and lab with result of 0.7, dr informed         0934 increased the Heparin rated to 1450 from 1300, got a bolus as well. Next 10a at 1500 with result of 0.21. No change and next 10a ordered in the AM.       Pharyngostomy irrigated/ orders. Pharyngostomy continued at LIS        Glucoses monitored, no coverage needed         1630 dr came to assess pt due to increase facial/neck and right eye swelling. Pt also c/o increased trouble breathing with sat at 97%  A:  Denied resp distress/cp nor any signs observed  P:   Report to oncoming nurse

## 2017-04-22 NOTE — PROGRESS NOTES
Thoracic Surgery Progress Note  Jim Braden  [id number]    Subjective  Worse crepitus on L this AM.  Failed swallow yesterday 2/2 aspiration.    Objective  Temp:  [96.1  F (35.6  C)-98.3  F (36.8  C)] 98.3  F (36.8  C)  Pulse:  [99] 99  Heart Rate:  [] 112  Resp:  [18-20] 18  BP: (127-153)/(76-85) 147/85  SpO2:  [95 %-96 %] 95 %    Physical Exam:  Gen: Awake, alert, NAD  HEENT: Incision dressing changed, c/d/i, crepitus R>L  CVS: RRR  Resp: NLB on RA  Abd: Soft, nt/nd  Extrem: WWP    Robust UOP  /100  Pharyngostomy 350/150    CBC ordered    CXR reviewed, worse crepitus, no obvious source    Assessment & Plan  56 year old male with history of LE DVT on lovenox, neoadjuvant chemo, s/p thoracoscopic/laparoscpic esophagectomy with cervical anastomosis, J tube, pharyngostomy tube placement 4/14 for lower esophageal adenocarcinoma.      Neuro: Scheduled oxycodone and Tylenol  Resp: PRN NC, aggressive pulm toilet, CT back to suctino today  Cardio: Holding home lisinopril, MECCA resolved, c/w metoprolol 12.5 mg BID  GI/FEN: NPO, pharyngostomy cares/flushes ordered, tube feeds to goal  Renal: Adequate UOP   Endo: No home meds  ID/heme: Periop zosyn completed, recheck WBC today  PPx: Continue LIH gtt, protonix    Seen and discussed with fellow, Dr. Youngblood.    Isidoro Perez MD  General Surgery PGY-3  [phone]

## 2017-04-23 ENCOUNTER — APPOINTMENT (OUTPATIENT)
Dept: GENERAL RADIOLOGY | Facility: CLINIC | Age: 57
DRG: 357 | End: 2017-04-23
Attending: STUDENT IN AN ORGANIZED HEALTH CARE EDUCATION/TRAINING PROGRAM
Payer: COMMERCIAL

## 2017-04-23 LAB
ALBUMIN UR-MCNC: ABNORMAL MG/DL
ANION GAP SERPL CALCULATED.3IONS-SCNC: 6 MMOL/L (ref 3–14)
APPEARANCE UR: ABNORMAL
BILIRUB UR QL STRIP: ABNORMAL
BUN SERPL-MCNC: 39 MG/DL (ref 7–30)
CALCIUM SERPL-MCNC: 9.3 MG/DL (ref 8.5–10.1)
CHLORIDE SERPL-SCNC: 106 MMOL/L (ref 94–109)
CO2 SERPL-SCNC: 28 MMOL/L (ref 20–32)
COLOR UR AUTO: ABNORMAL
CREAT SERPL-MCNC: 0.64 MG/DL (ref 0.66–1.25)
ERYTHROCYTE [DISTWIDTH] IN BLOOD BY AUTOMATED COUNT: 14.6 % (ref 10–15)
GFR SERPL CREATININE-BSD FRML MDRD: ABNORMAL ML/MIN/1.7M2
GLUCOSE BLDC GLUCOMTR-MCNC: 113 MG/DL (ref 70–99)
GLUCOSE BLDC GLUCOMTR-MCNC: 119 MG/DL (ref 70–99)
GLUCOSE BLDC GLUCOMTR-MCNC: 127 MG/DL (ref 70–99)
GLUCOSE BLDC GLUCOMTR-MCNC: 150 MG/DL (ref 70–99)
GLUCOSE BLDC GLUCOMTR-MCNC: 162 MG/DL (ref 70–99)
GLUCOSE BLDC GLUCOMTR-MCNC: 180 MG/DL (ref 70–99)
GLUCOSE SERPL-MCNC: 147 MG/DL (ref 70–99)
GLUCOSE UR STRIP-MCNC: ABNORMAL MG/DL
HCT VFR BLD AUTO: 33.6 % (ref 40–53)
HGB BLD-MCNC: 10.5 G/DL (ref 13.3–17.7)
HGB UR QL STRIP: ABNORMAL
KETONES UR STRIP-MCNC: ABNORMAL MG/DL
LEUKOCYTE ESTERASE UR QL STRIP: ABNORMAL
LMWH PPP CHRO-ACNC: 0.16 IU/ML
MCH RBC QN AUTO: 31.1 PG (ref 26.5–33)
MCHC RBC AUTO-ENTMCNC: 31.3 G/DL (ref 31.5–36.5)
MCV RBC AUTO: 99 FL (ref 78–100)
NITRATE UR QL: ABNORMAL
PH UR STRIP: ABNORMAL PH (ref 5–7)
PLATELET # BLD AUTO: 436 10E9/L (ref 150–450)
POTASSIUM SERPL-SCNC: 4.6 MMOL/L (ref 3.4–5.3)
RBC # BLD AUTO: 3.38 10E12/L (ref 4.4–5.9)
RBC #/AREA URNS AUTO: ABNORMAL /HPF (ref 0–2)
SODIUM SERPL-SCNC: 140 MMOL/L (ref 133–144)
SP GR UR STRIP: ABNORMAL (ref 1–1.03)
URN SPEC COLLECT METH UR: ABNORMAL
UROBILINOGEN UR STRIP-MCNC: ABNORMAL MG/DL (ref 0–2)
WBC # BLD AUTO: 13.9 10E9/L (ref 4–11)
WBC #/AREA URNS AUTO: ABNORMAL /HPF (ref 0–2)

## 2017-04-23 PROCEDURE — 87075 CULTR BACTERIA EXCEPT BLOOD: CPT | Performed by: SURGERY

## 2017-04-23 PROCEDURE — 80048 BASIC METABOLIC PNL TOTAL CA: CPT | Performed by: SURGERY

## 2017-04-23 PROCEDURE — 87070 CULTURE OTHR SPECIMN AEROBIC: CPT | Performed by: SURGERY

## 2017-04-23 PROCEDURE — 85027 COMPLETE CBC AUTOMATED: CPT | Performed by: SURGERY

## 2017-04-23 PROCEDURE — 36592 COLLECT BLOOD FROM PICC: CPT | Performed by: SURGERY

## 2017-04-23 PROCEDURE — 12000001 ZZH R&B MED SURG/OB UMMC

## 2017-04-23 PROCEDURE — 27210437 ZZH NUTRITION PRODUCT SEMIELEM INTERMED LITER

## 2017-04-23 PROCEDURE — 87040 BLOOD CULTURE FOR BACTERIA: CPT | Performed by: SURGERY

## 2017-04-23 PROCEDURE — 25000132 ZZH RX MED GY IP 250 OP 250 PS 637: Performed by: SURGERY

## 2017-04-23 PROCEDURE — 87077 CULTURE AEROBIC IDENTIFY: CPT | Performed by: SURGERY

## 2017-04-23 PROCEDURE — 25000132 ZZH RX MED GY IP 250 OP 250 PS 637: Performed by: PHYSICIAN ASSISTANT

## 2017-04-23 PROCEDURE — 81001 URINALYSIS AUTO W/SCOPE: CPT | Performed by: SURGERY

## 2017-04-23 PROCEDURE — 40000141 ZZH STATISTIC PERIPHERAL IV START W/O US GUIDANCE

## 2017-04-23 PROCEDURE — 71020 XR CHEST 2 VW: CPT

## 2017-04-23 PROCEDURE — 25000132 ZZH RX MED GY IP 250 OP 250 PS 637: Performed by: THORACIC SURGERY (CARDIOTHORACIC VASCULAR SURGERY)

## 2017-04-23 PROCEDURE — 25000128 H RX IP 250 OP 636: Performed by: SURGERY

## 2017-04-23 PROCEDURE — 00000146 ZZHCL STATISTIC GLUCOSE BY METER IP

## 2017-04-23 PROCEDURE — 85520 HEPARIN ASSAY: CPT | Performed by: SURGERY

## 2017-04-23 PROCEDURE — 36415 COLL VENOUS BLD VENIPUNCTURE: CPT | Performed by: THORACIC SURGERY (CARDIOTHORACIC VASCULAR SURGERY)

## 2017-04-23 PROCEDURE — 85520 HEPARIN ASSAY: CPT | Performed by: THORACIC SURGERY (CARDIOTHORACIC VASCULAR SURGERY)

## 2017-04-23 PROCEDURE — 36415 COLL VENOUS BLD VENIPUNCTURE: CPT | Performed by: SURGERY

## 2017-04-23 RX ORDER — HYDROXYZINE HCL 10 MG/5 ML
10-25 SOLUTION, ORAL ORAL EVERY 6 HOURS PRN
Status: DISCONTINUED | OUTPATIENT
Start: 2017-04-23 | End: 2017-05-01 | Stop reason: HOSPADM

## 2017-04-23 RX ADMIN — ACETAMINOPHEN 650 MG: 325 SOLUTION ORAL at 09:17

## 2017-04-23 RX ADMIN — OXYCODONE HYDROCHLORIDE 5 MG: 5 SOLUTION ORAL at 00:00

## 2017-04-23 RX ADMIN — OXYCODONE HYDROCHLORIDE 5 MG: 5 SOLUTION ORAL at 20:18

## 2017-04-23 RX ADMIN — SENNOSIDES A AND B 10 ML: 415.36 LIQUID ORAL at 20:17

## 2017-04-23 RX ADMIN — INSULIN ASPART 1 UNITS: 100 INJECTION, SOLUTION INTRAVENOUS; SUBCUTANEOUS at 04:02

## 2017-04-23 RX ADMIN — MULTIVITAMIN 15 ML: LIQUID ORAL at 09:20

## 2017-04-23 RX ADMIN — OXYCODONE HYDROCHLORIDE 5 MG: 5 SOLUTION ORAL at 09:19

## 2017-04-23 RX ADMIN — PANTOPRAZOLE SODIUM 40 MG: 40 TABLET, DELAYED RELEASE ORAL at 09:17

## 2017-04-23 RX ADMIN — INSULIN ASPART 1 UNITS: 100 INJECTION, SOLUTION INTRAVENOUS; SUBCUTANEOUS at 09:15

## 2017-04-23 RX ADMIN — OXYCODONE HYDROCHLORIDE 5 MG: 5 SOLUTION ORAL at 00:01

## 2017-04-23 RX ADMIN — CHLORHEXIDINE GLUCONATE 15 ML: 1.2 RINSE ORAL at 09:19

## 2017-04-23 RX ADMIN — HYDROXYZINE HYDROCHLORIDE 25 MG: 10 SYRUP ORAL at 16:02

## 2017-04-23 RX ADMIN — OXYCODONE HYDROCHLORIDE 5 MG: 5 SOLUTION ORAL at 04:01

## 2017-04-23 RX ADMIN — SENNOSIDES A AND B 5 ML: 415.36 LIQUID ORAL at 12:37

## 2017-04-23 RX ADMIN — OXYCODONE HYDROCHLORIDE 5 MG: 5 SOLUTION ORAL at 04:02

## 2017-04-23 RX ADMIN — PANTOPRAZOLE SODIUM 40 MG: 40 TABLET, DELAYED RELEASE ORAL at 20:17

## 2017-04-23 RX ADMIN — METOPROLOL TARTRATE 12.5 MG: 100 TABLET ORAL at 20:17

## 2017-04-23 RX ADMIN — INSULIN ASPART 1 UNITS: 100 INJECTION, SOLUTION INTRAVENOUS; SUBCUTANEOUS at 00:00

## 2017-04-23 RX ADMIN — OXYCODONE HYDROCHLORIDE 5 MG: 5 SOLUTION ORAL at 12:32

## 2017-04-23 RX ADMIN — OXYCODONE HYDROCHLORIDE 5 MG: 5 SOLUTION ORAL at 16:02

## 2017-04-23 RX ADMIN — ACETAMINOPHEN 650 MG: 325 SOLUTION ORAL at 12:33

## 2017-04-23 RX ADMIN — METOPROLOL TARTRATE 12.5 MG: 100 TABLET ORAL at 09:16

## 2017-04-23 RX ADMIN — ANTISEPTIC SURGICAL SCRUB: 0.04 SOLUTION TOPICAL at 20:19

## 2017-04-23 RX ADMIN — OXYCODONE HYDROCHLORIDE 5 MG: 5 SOLUTION ORAL at 09:15

## 2017-04-23 RX ADMIN — ANTISEPTIC SURGICAL SCRUB: 0.04 SOLUTION TOPICAL at 09:20

## 2017-04-23 RX ADMIN — HEPARIN SODIUM 1450 UNITS/HR: 10000 INJECTION, SOLUTION INTRAVENOUS at 10:42

## 2017-04-23 RX ADMIN — OXYCODONE HYDROCHLORIDE 5 MG: 5 SOLUTION ORAL at 12:33

## 2017-04-23 ASSESSMENT — PAIN DESCRIPTION - DESCRIPTORS: DESCRIPTORS: SORE

## 2017-04-23 NOTE — PROGRESS NOTES
Thoracic Surgery Progress Note  Jim Braden  [id number]    Subjective  No acute events overnight.  Subcu emphysema about the same.      Objective  Temp:  [95.8  F (35.4  C)-97.1  F (36.2  C)] 96  F (35.6  C)  Heart Rate:  [] 111  Resp:  [18] 18  BP: (134-175)/(78-88) 155/80  SpO2:  [94 %-98 %] 96 %    Physical Exam:  Gen: Awake, alert, NAD  HEENT: Dressing changed, c/d/i, pharyngostomy functioning  CVS: Sinus tachycardia  Resp: NLB on RA, CT without leak  Abd: Soft, nt/nd  Extrem: WWP    UOP adequate  Pharyngostomy 475/100  /100    Cr normal  WBC mildly up 13.9, hgb stable    CXR    IMPRESSION:   1. Improving trace left pleural effusion with overlying atelectasis  versus consolidation.  2. Stable subcutaneous emphysema projecting over the lower neck. No  definite pneumothorax.    Assessment & Plan  56 year old male with history of LE DVT on lovenox, neoadjuvant chemo, s/p thoracoscopic/laparoscpic esophagectomy with cervical anastomosis, J tube, pharyngostomy tube placement 4/14 for lower esophageal adenocarcinoma.      Neuro: Scheduled oxycodone and Tylenol  Resp: PRN NC, aggressive pulm toilet, CT to suction, EGD tomorrow  Cardio: Holding home lisinopril, MECCA resolved, c/w metoprolol 12.5 mg BID  GI/FEN: NPO, pharyngostomy cares/flushes ordered, tube feeds to goal  Renal: Adequate UOP   Endo: No home meds  ID/heme: WBC mildly up, culture if febrile  PPx: Continue LIH gtt, protonix     Seen and discussed with fellow, Dr. Youngblood.    Isidoro Perez MD  General Surgery PGY-3  [phone]

## 2017-04-23 NOTE — PLAN OF CARE
Problem: Goal Outcome Summary  Goal: Goal Outcome Summary  Outcome: No Change  Slightly tachy. Pt c/o pain to back, abdomen and neck. Prn oxycodone given with scheduled oxy. Pt declined any other prn pain meds when offered. p tube to LIS with 100ml brownish output. Flushed per orders. CT to -20 suction, 100ml out overnight. TF to J tube at goal of 105ml/hr. Heparin gtt at 1450units/hr, 10a check this am. Face and neck swollen, face seems decreased from last evening. Pt very restless and anxious about plan overnight, slept a little. Anxiously awaiting team to round. C/o dry throat and wheeze. Prn trazodone given last evening. Up ind. Had BMs last night. Voiding adequately. BS checks q4h, covered with SSI per orders.

## 2017-04-23 NOTE — PLAN OF CARE
Problem: Goal Outcome Summary  Goal: Goal Outcome Summary  PT 7B: CANCEL. Pt declining therapy this day d/t fatigue and pain. Per pt and RN, pt has been up to chair and walking multiple times/day. Pt denies decline in balance or strength, agreeable to continue walks with nsg or family later this day. Will check-back per POC.

## 2017-04-23 NOTE — PROGRESS NOTES
Focus: status  D:  Monitoring status  I:   Vitals taken        Continued to get scheduled pain med and sleep afterward. Pt would say that he was unable to sleep but when nurse would make very frequent checks on him, he would be asleep.         was informed that pt said he had anxiety at times during the day, see new order         All drains ptnt       CT to -20 cm suction,  Ct canister changed by the float nurse, 1910 cc was the total in the old canister.         Pt had blood cultures drawn 1st and then the PICC was dc'd with the Tip cultured. All tubing retained when PICC was dc'd. No c/o before or after PICC dc'd  Pt got a PIV for the Heparin drip which remained at 1450 units/h. 10a today was 0.16 and required no change, next 10a in AM.          Informed oncoming nurse to send a UA          Pt was informed to remain NPO and dr said to use the TF for Anesthesia protocol with the TF. Oncoming nurse informed.  A:   Pt will have an Endoscopy tomorrow, no time known, oncoming nurse informed         Denied resp distress/cp nor any observed  P:    Report to oncoming nurse

## 2017-04-24 ENCOUNTER — APPOINTMENT (OUTPATIENT)
Dept: GENERAL RADIOLOGY | Facility: CLINIC | Age: 57
DRG: 357 | End: 2017-04-24
Attending: THORACIC SURGERY (CARDIOTHORACIC VASCULAR SURGERY)
Payer: COMMERCIAL

## 2017-04-24 ENCOUNTER — APPOINTMENT (OUTPATIENT)
Dept: CT IMAGING | Facility: CLINIC | Age: 57
DRG: 357 | End: 2017-04-24
Attending: PHYSICIAN ASSISTANT
Payer: COMMERCIAL

## 2017-04-24 ENCOUNTER — APPOINTMENT (OUTPATIENT)
Dept: GENERAL RADIOLOGY | Facility: CLINIC | Age: 57
DRG: 357 | End: 2017-04-24
Attending: STUDENT IN AN ORGANIZED HEALTH CARE EDUCATION/TRAINING PROGRAM
Payer: COMMERCIAL

## 2017-04-24 ENCOUNTER — ANESTHESIA EVENT (OUTPATIENT)
Dept: SURGERY | Facility: CLINIC | Age: 57
DRG: 357 | End: 2017-04-24
Payer: COMMERCIAL

## 2017-04-24 ENCOUNTER — ANESTHESIA (OUTPATIENT)
Dept: SURGERY | Facility: CLINIC | Age: 57
DRG: 357 | End: 2017-04-24
Payer: COMMERCIAL

## 2017-04-24 LAB
ALBUMIN UR-MCNC: 10 MG/DL
ANION GAP SERPL CALCULATED.3IONS-SCNC: 6 MMOL/L (ref 3–14)
APPEARANCE UR: CLEAR
BILIRUB UR QL STRIP: NEGATIVE
BUN SERPL-MCNC: 34 MG/DL (ref 7–30)
CALCIUM SERPL-MCNC: 9.2 MG/DL (ref 8.5–10.1)
CHLORIDE SERPL-SCNC: 107 MMOL/L (ref 94–109)
CO2 SERPL-SCNC: 25 MMOL/L (ref 20–32)
COLOR UR AUTO: YELLOW
CREAT SERPL-MCNC: 0.71 MG/DL (ref 0.66–1.25)
CRP SERPL-MCNC: 140 MG/L (ref 0–8)
ERYTHROCYTE [DISTWIDTH] IN BLOOD BY AUTOMATED COUNT: 14.7 % (ref 10–15)
GFR SERPL CREATININE-BSD FRML MDRD: ABNORMAL ML/MIN/1.7M2
GLUCOSE BLDC GLUCOMTR-MCNC: 126 MG/DL (ref 70–99)
GLUCOSE BLDC GLUCOMTR-MCNC: 131 MG/DL (ref 70–99)
GLUCOSE BLDC GLUCOMTR-MCNC: 133 MG/DL (ref 70–99)
GLUCOSE BLDC GLUCOMTR-MCNC: 148 MG/DL (ref 70–99)
GLUCOSE SERPL-MCNC: 125 MG/DL (ref 70–99)
GLUCOSE UR STRIP-MCNC: NEGATIVE MG/DL
HCT VFR BLD AUTO: 34.4 % (ref 40–53)
HGB BLD-MCNC: 10.9 G/DL (ref 13.3–17.7)
HGB UR QL STRIP: NEGATIVE
KETONES UR STRIP-MCNC: NEGATIVE MG/DL
LACTATE BLD-SCNC: 1 MMOL/L (ref 0.7–2.1)
LEUKOCYTE ESTERASE UR QL STRIP: NEGATIVE
LMWH PPP CHRO-ACNC: 0.14 IU/ML
LMWH PPP CHRO-ACNC: 0.27 IU/ML
LMWH PPP CHRO-ACNC: NORMAL IU/ML
MCH RBC QN AUTO: 31.5 PG (ref 26.5–33)
MCHC RBC AUTO-ENTMCNC: 31.7 G/DL (ref 31.5–36.5)
MCV RBC AUTO: 99 FL (ref 78–100)
MUCOUS THREADS #/AREA URNS LPF: PRESENT /LPF
NITRATE UR QL: NEGATIVE
PH UR STRIP: 6.5 PH (ref 5–7)
PLATELET # BLD AUTO: 450 10E9/L (ref 150–450)
POTASSIUM SERPL-SCNC: 4.6 MMOL/L (ref 3.4–5.3)
RBC # BLD AUTO: 3.46 10E12/L (ref 4.4–5.9)
RBC #/AREA URNS AUTO: 2 /HPF (ref 0–2)
SODIUM SERPL-SCNC: 139 MMOL/L (ref 133–144)
SP GR UR STRIP: 1.02 (ref 1–1.03)
URN SPEC COLLECT METH UR: ABNORMAL
UROBILINOGEN UR STRIP-MCNC: NORMAL MG/DL (ref 0–2)
WBC # BLD AUTO: 14.9 10E9/L (ref 4–11)
WBC #/AREA URNS AUTO: <1 /HPF (ref 0–2)

## 2017-04-24 PROCEDURE — C9399 UNCLASSIFIED DRUGS OR BIOLOG: HCPCS | Performed by: NURSE ANESTHETIST, CERTIFIED REGISTERED

## 2017-04-24 PROCEDURE — 36415 COLL VENOUS BLD VENIPUNCTURE: CPT | Performed by: THORACIC SURGERY (CARDIOTHORACIC VASCULAR SURGERY)

## 2017-04-24 PROCEDURE — 83605 ASSAY OF LACTIC ACID: CPT | Performed by: THORACIC SURGERY (CARDIOTHORACIC VASCULAR SURGERY)

## 2017-04-24 PROCEDURE — 74176 CT ABD & PELVIS W/O CONTRAST: CPT

## 2017-04-24 PROCEDURE — 85027 COMPLETE CBC AUTOMATED: CPT | Performed by: SURGERY

## 2017-04-24 PROCEDURE — 00000146 ZZHCL STATISTIC GLUCOSE BY METER IP

## 2017-04-24 PROCEDURE — 0DJ68ZZ INSPECTION OF STOMACH, VIA NATURAL OR ARTIFICIAL OPENING ENDOSCOPIC: ICD-10-PCS | Performed by: THORACIC SURGERY (CARDIOTHORACIC VASCULAR SURGERY)

## 2017-04-24 PROCEDURE — 37000008 ZZH ANESTHESIA TECHNICAL FEE, 1ST 30 MIN: Performed by: THORACIC SURGERY (CARDIOTHORACIC VASCULAR SURGERY)

## 2017-04-24 PROCEDURE — 25000132 ZZH RX MED GY IP 250 OP 250 PS 637: Performed by: SURGERY

## 2017-04-24 PROCEDURE — 40000170 ZZH STATISTIC PRE-PROCEDURE ASSESSMENT II: Performed by: THORACIC SURGERY (CARDIOTHORACIC VASCULAR SURGERY)

## 2017-04-24 PROCEDURE — 12000003 ZZH R&B CRITICAL UMMC

## 2017-04-24 PROCEDURE — 25000132 ZZH RX MED GY IP 250 OP 250 PS 637: Performed by: STUDENT IN AN ORGANIZED HEALTH CARE EDUCATION/TRAINING PROGRAM

## 2017-04-24 PROCEDURE — 37000009 ZZH ANESTHESIA TECHNICAL FEE, EACH ADDTL 15 MIN: Performed by: THORACIC SURGERY (CARDIOTHORACIC VASCULAR SURGERY)

## 2017-04-24 PROCEDURE — 25800025 ZZH RX 258: Performed by: NURSE ANESTHETIST, CERTIFIED REGISTERED

## 2017-04-24 PROCEDURE — 71000014 ZZH RECOVERY PHASE 1 LEVEL 2 FIRST HR: Performed by: THORACIC SURGERY (CARDIOTHORACIC VASCULAR SURGERY)

## 2017-04-24 PROCEDURE — 86140 C-REACTIVE PROTEIN: CPT | Performed by: SURGERY

## 2017-04-24 PROCEDURE — 25000565 ZZH ISOFLURANE, EA 15 MIN: Performed by: THORACIC SURGERY (CARDIOTHORACIC VASCULAR SURGERY)

## 2017-04-24 PROCEDURE — 25000128 H RX IP 250 OP 636: Performed by: PHYSICIAN ASSISTANT

## 2017-04-24 PROCEDURE — 36415 COLL VENOUS BLD VENIPUNCTURE: CPT | Performed by: PHYSICIAN ASSISTANT

## 2017-04-24 PROCEDURE — 40000556 ZZH STATISTIC PERIPHERAL IV START W US GUIDANCE

## 2017-04-24 PROCEDURE — 71020 XR CHEST 2 VW: CPT

## 2017-04-24 PROCEDURE — 85520 HEPARIN ASSAY: CPT | Performed by: THORACIC SURGERY (CARDIOTHORACIC VASCULAR SURGERY)

## 2017-04-24 PROCEDURE — 25000132 ZZH RX MED GY IP 250 OP 250 PS 637: Performed by: PHYSICIAN ASSISTANT

## 2017-04-24 PROCEDURE — 25000132 ZZH RX MED GY IP 250 OP 250 PS 637: Performed by: THORACIC SURGERY (CARDIOTHORACIC VASCULAR SURGERY)

## 2017-04-24 PROCEDURE — 25800025 ZZH RX 258: Performed by: STUDENT IN AN ORGANIZED HEALTH CARE EDUCATION/TRAINING PROGRAM

## 2017-04-24 PROCEDURE — 25000128 H RX IP 250 OP 636: Performed by: SURGERY

## 2017-04-24 PROCEDURE — 25000132 ZZH RX MED GY IP 250 OP 250 PS 637

## 2017-04-24 PROCEDURE — 25000128 H RX IP 250 OP 636: Performed by: ANESTHESIOLOGY

## 2017-04-24 PROCEDURE — 25800025 ZZH RX 258: Performed by: ANESTHESIOLOGY

## 2017-04-24 PROCEDURE — 27210437 ZZH NUTRITION PRODUCT SEMIELEM INTERMED LITER

## 2017-04-24 PROCEDURE — 25500064 ZZH RX 255 OP 636: Performed by: THORACIC SURGERY (CARDIOTHORACIC VASCULAR SURGERY)

## 2017-04-24 PROCEDURE — 36000062 ZZH SURGERY LEVEL 4 1ST 30 MIN - UMMC: Performed by: THORACIC SURGERY (CARDIOTHORACIC VASCULAR SURGERY)

## 2017-04-24 PROCEDURE — 36000064 ZZH SURGERY LEVEL 4 EA 15 ADDTL MIN - UMMC: Performed by: THORACIC SURGERY (CARDIOTHORACIC VASCULAR SURGERY)

## 2017-04-24 PROCEDURE — 85520 HEPARIN ASSAY: CPT | Performed by: PHYSICIAN ASSISTANT

## 2017-04-24 PROCEDURE — 25000125 ZZHC RX 250: Performed by: NURSE ANESTHETIST, CERTIFIED REGISTERED

## 2017-04-24 PROCEDURE — 27210794 ZZH OR GENERAL SUPPLY STERILE: Performed by: THORACIC SURGERY (CARDIOTHORACIC VASCULAR SURGERY)

## 2017-04-24 PROCEDURE — 81001 URINALYSIS AUTO W/SCOPE: CPT | Performed by: THORACIC SURGERY (CARDIOTHORACIC VASCULAR SURGERY)

## 2017-04-24 PROCEDURE — 25000128 H RX IP 250 OP 636: Performed by: NURSE ANESTHETIST, CERTIFIED REGISTERED

## 2017-04-24 PROCEDURE — 0W9C0ZZ DRAINAGE OF MEDIASTINUM, OPEN APPROACH: ICD-10-PCS | Performed by: THORACIC SURGERY (CARDIOTHORACIC VASCULAR SURGERY)

## 2017-04-24 PROCEDURE — 80048 BASIC METABOLIC PNL TOTAL CA: CPT | Performed by: SURGERY

## 2017-04-24 PROCEDURE — 40000277 XR SURGERY CARM FLUORO LESS THAN 5 MIN W STILLS: Mod: TC

## 2017-04-24 RX ORDER — HYDROMORPHONE HYDROCHLORIDE 1 MG/ML
.3-.5 INJECTION, SOLUTION INTRAMUSCULAR; INTRAVENOUS; SUBCUTANEOUS EVERY 5 MIN PRN
Status: DISCONTINUED | OUTPATIENT
Start: 2017-04-24 | End: 2017-04-24 | Stop reason: HOSPADM

## 2017-04-24 RX ORDER — CEFAZOLIN SODIUM 1 G/3ML
1 INJECTION, POWDER, FOR SOLUTION INTRAMUSCULAR; INTRAVENOUS SEE ADMIN INSTRUCTIONS
Status: DISCONTINUED | OUTPATIENT
Start: 2017-04-24 | End: 2017-04-24 | Stop reason: HOSPADM

## 2017-04-24 RX ORDER — ONDANSETRON 2 MG/ML
INJECTION INTRAMUSCULAR; INTRAVENOUS PRN
Status: DISCONTINUED | OUTPATIENT
Start: 2017-04-24 | End: 2017-04-24

## 2017-04-24 RX ORDER — LIDOCAINE 40 MG/G
CREAM TOPICAL
Status: DISCONTINUED | OUTPATIENT
Start: 2017-04-24 | End: 2017-04-24 | Stop reason: HOSPADM

## 2017-04-24 RX ORDER — CEFAZOLIN SODIUM 2 G/100ML
2 INJECTION, SOLUTION INTRAVENOUS
Status: DISCONTINUED | OUTPATIENT
Start: 2017-04-24 | End: 2017-04-24 | Stop reason: HOSPADM

## 2017-04-24 RX ORDER — SODIUM CHLORIDE, SODIUM LACTATE, POTASSIUM CHLORIDE, CALCIUM CHLORIDE 600; 310; 30; 20 MG/100ML; MG/100ML; MG/100ML; MG/100ML
INJECTION, SOLUTION INTRAVENOUS CONTINUOUS
Status: DISCONTINUED | OUTPATIENT
Start: 2017-04-24 | End: 2017-04-25

## 2017-04-24 RX ORDER — SODIUM CHLORIDE, SODIUM LACTATE, POTASSIUM CHLORIDE, CALCIUM CHLORIDE 600; 310; 30; 20 MG/100ML; MG/100ML; MG/100ML; MG/100ML
INJECTION, SOLUTION INTRAVENOUS CONTINUOUS
Status: DISCONTINUED | OUTPATIENT
Start: 2017-04-24 | End: 2017-04-24 | Stop reason: HOSPADM

## 2017-04-24 RX ORDER — FENTANYL CITRATE 50 UG/ML
25-50 INJECTION, SOLUTION INTRAMUSCULAR; INTRAVENOUS
Status: DISCONTINUED | OUTPATIENT
Start: 2017-04-24 | End: 2017-04-24 | Stop reason: HOSPADM

## 2017-04-24 RX ORDER — SODIUM CHLORIDE, SODIUM LACTATE, POTASSIUM CHLORIDE, CALCIUM CHLORIDE 600; 310; 30; 20 MG/100ML; MG/100ML; MG/100ML; MG/100ML
INJECTION, SOLUTION INTRAVENOUS CONTINUOUS PRN
Status: DISCONTINUED | OUTPATIENT
Start: 2017-04-24 | End: 2017-04-24

## 2017-04-24 RX ORDER — ALBUTEROL SULFATE 0.83 MG/ML
2.5 SOLUTION RESPIRATORY (INHALATION) EVERY 4 HOURS PRN
Status: DISCONTINUED | OUTPATIENT
Start: 2017-04-24 | End: 2017-04-24 | Stop reason: HOSPADM

## 2017-04-24 RX ORDER — FENTANYL CITRATE 50 UG/ML
INJECTION, SOLUTION INTRAMUSCULAR; INTRAVENOUS PRN
Status: DISCONTINUED | OUTPATIENT
Start: 2017-04-24 | End: 2017-04-24

## 2017-04-24 RX ORDER — MEPERIDINE HYDROCHLORIDE 25 MG/ML
12.5 INJECTION INTRAMUSCULAR; INTRAVENOUS; SUBCUTANEOUS EVERY 5 MIN PRN
Status: DISCONTINUED | OUTPATIENT
Start: 2017-04-24 | End: 2017-04-24 | Stop reason: HOSPADM

## 2017-04-24 RX ORDER — LIDOCAINE HYDROCHLORIDE 20 MG/ML
INJECTION, SOLUTION INFILTRATION; PERINEURAL PRN
Status: DISCONTINUED | OUTPATIENT
Start: 2017-04-24 | End: 2017-04-24

## 2017-04-24 RX ORDER — IOPAMIDOL 408 MG/ML
INJECTION, SOLUTION INTRAVASCULAR PRN
Status: DISCONTINUED | OUTPATIENT
Start: 2017-04-24 | End: 2017-04-24 | Stop reason: HOSPADM

## 2017-04-24 RX ORDER — LABETALOL HYDROCHLORIDE 5 MG/ML
10 INJECTION, SOLUTION INTRAVENOUS
Status: DISCONTINUED | OUTPATIENT
Start: 2017-04-24 | End: 2017-04-24 | Stop reason: HOSPADM

## 2017-04-24 RX ORDER — ONDANSETRON 4 MG/1
4 TABLET, ORALLY DISINTEGRATING ORAL EVERY 30 MIN PRN
Status: DISCONTINUED | OUTPATIENT
Start: 2017-04-24 | End: 2017-04-24 | Stop reason: HOSPADM

## 2017-04-24 RX ORDER — PROPOFOL 10 MG/ML
INJECTION, EMULSION INTRAVENOUS PRN
Status: DISCONTINUED | OUTPATIENT
Start: 2017-04-24 | End: 2017-04-24

## 2017-04-24 RX ORDER — ONDANSETRON 2 MG/ML
4 INJECTION INTRAMUSCULAR; INTRAVENOUS EVERY 30 MIN PRN
Status: DISCONTINUED | OUTPATIENT
Start: 2017-04-24 | End: 2017-04-24 | Stop reason: HOSPADM

## 2017-04-24 RX ADMIN — OXYCODONE HYDROCHLORIDE 5 MG: 5 SOLUTION ORAL at 16:44

## 2017-04-24 RX ADMIN — ONDANSETRON 4 MG: 2 INJECTION INTRAMUSCULAR; INTRAVENOUS at 14:58

## 2017-04-24 RX ADMIN — PHENYLEPHRINE HYDROCHLORIDE 100 MCG: 10 INJECTION, SOLUTION INTRAMUSCULAR; INTRAVENOUS; SUBCUTANEOUS at 14:53

## 2017-04-24 RX ADMIN — OXYCODONE HYDROCHLORIDE 5 MG: 5 SOLUTION ORAL at 21:13

## 2017-04-24 RX ADMIN — SODIUM CHLORIDE, POTASSIUM CHLORIDE, SODIUM LACTATE AND CALCIUM CHLORIDE: 600; 310; 30; 20 INJECTION, SOLUTION INTRAVENOUS at 16:09

## 2017-04-24 RX ADMIN — FENTANYL CITRATE 100 MCG: 50 INJECTION, SOLUTION INTRAMUSCULAR; INTRAVENOUS at 14:36

## 2017-04-24 RX ADMIN — SENNOSIDES A AND B 10 ML: 415.36 LIQUID ORAL at 08:13

## 2017-04-24 RX ADMIN — OXYCODONE HYDROCHLORIDE 5 MG: 5 SOLUTION ORAL at 04:33

## 2017-04-24 RX ADMIN — HYDROMORPHONE HYDROCHLORIDE 0.2 MG: 10 INJECTION, SOLUTION INTRAMUSCULAR; INTRAVENOUS; SUBCUTANEOUS at 13:13

## 2017-04-24 RX ADMIN — SODIUM CHLORIDE, POTASSIUM CHLORIDE, SODIUM LACTATE AND CALCIUM CHLORIDE: 600; 310; 30; 20 INJECTION, SOLUTION INTRAVENOUS at 14:18

## 2017-04-24 RX ADMIN — PANTOPRAZOLE SODIUM 40 MG: 40 TABLET, DELAYED RELEASE ORAL at 20:02

## 2017-04-24 RX ADMIN — PANTOPRAZOLE SODIUM 40 MG: 40 TABLET, DELAYED RELEASE ORAL at 08:12

## 2017-04-24 RX ADMIN — DOCUSATE SODIUM 286 ML: 50 LIQUID ORAL at 10:01

## 2017-04-24 RX ADMIN — PROPOFOL 150 MG: 10 INJECTION, EMULSION INTRAVENOUS at 14:36

## 2017-04-24 RX ADMIN — PHENYLEPHRINE HYDROCHLORIDE 100 MCG: 10 INJECTION, SOLUTION INTRAMUSCULAR; INTRAVENOUS; SUBCUTANEOUS at 14:56

## 2017-04-24 RX ADMIN — HYDROMORPHONE HYDROCHLORIDE 0.2 MG: 10 INJECTION, SOLUTION INTRAMUSCULAR; INTRAVENOUS; SUBCUTANEOUS at 12:35

## 2017-04-24 RX ADMIN — OXYCODONE HYDROCHLORIDE 5 MG: 5 SOLUTION ORAL at 08:13

## 2017-04-24 RX ADMIN — METOPROLOL TARTRATE 12.5 MG: 100 TABLET ORAL at 08:13

## 2017-04-24 RX ADMIN — ACETAMINOPHEN 650 MG: 325 SOLUTION ORAL at 21:13

## 2017-04-24 RX ADMIN — ROCURONIUM BROMIDE 30 MG: 10 INJECTION INTRAVENOUS at 14:44

## 2017-04-24 RX ADMIN — HEPARIN SODIUM 1600 UNITS/HR: 10000 INJECTION, SOLUTION INTRAVENOUS at 03:10

## 2017-04-24 RX ADMIN — ANTISEPTIC SURGICAL SCRUB: 0.04 SOLUTION TOPICAL at 10:38

## 2017-04-24 RX ADMIN — HYDROMORPHONE HYDROCHLORIDE 0.2 MG: 10 INJECTION, SOLUTION INTRAMUSCULAR; INTRAVENOUS; SUBCUTANEOUS at 10:44

## 2017-04-24 RX ADMIN — SUGAMMADEX 150 MG: 100 INJECTION, SOLUTION INTRAVENOUS at 15:06

## 2017-04-24 RX ADMIN — OXYCODONE HYDROCHLORIDE 5 MG: 5 SOLUTION ORAL at 00:30

## 2017-04-24 RX ADMIN — INSULIN ASPART 1 UNITS: 100 INJECTION, SOLUTION INTRAVENOUS; SUBCUTANEOUS at 00:30

## 2017-04-24 RX ADMIN — MIDAZOLAM HYDROCHLORIDE 2 MG: 1 INJECTION, SOLUTION INTRAMUSCULAR; INTRAVENOUS at 14:18

## 2017-04-24 RX ADMIN — ONDANSETRON 4 MG: 2 INJECTION INTRAMUSCULAR; INTRAVENOUS at 20:18

## 2017-04-24 RX ADMIN — OXYCODONE HYDROCHLORIDE 5 MG: 5 SOLUTION ORAL at 19:56

## 2017-04-24 RX ADMIN — METOPROLOL TARTRATE 12.5 MG: 100 TABLET ORAL at 20:02

## 2017-04-24 RX ADMIN — SODIUM CHLORIDE, POTASSIUM CHLORIDE, SODIUM LACTATE AND CALCIUM CHLORIDE: 600; 310; 30; 20 INJECTION, SOLUTION INTRAVENOUS at 22:23

## 2017-04-24 RX ADMIN — MULTIVITAMIN 15 ML: LIQUID ORAL at 08:13

## 2017-04-24 RX ADMIN — HYDROXYZINE HYDROCHLORIDE 25 MG: 10 SYRUP ORAL at 01:09

## 2017-04-24 RX ADMIN — SUCCINYLCHOLINE CHLORIDE 100 MG: 20 INJECTION, SOLUTION INTRAMUSCULAR; INTRAVENOUS at 14:36

## 2017-04-24 RX ADMIN — OXYCODONE HYDROCHLORIDE 5 MG: 5 SOLUTION ORAL at 00:24

## 2017-04-24 RX ADMIN — HEPARIN SODIUM 1600 UNITS/HR: 10000 INJECTION, SOLUTION INTRAVENOUS at 19:41

## 2017-04-24 RX ADMIN — CHLORHEXIDINE GLUCONATE 15 ML: 1.2 RINSE ORAL at 08:27

## 2017-04-24 RX ADMIN — LIDOCAINE HYDROCHLORIDE 80 MG: 20 INJECTION, SOLUTION INFILTRATION; PERINEURAL at 14:36

## 2017-04-24 RX ADMIN — ACETAMINOPHEN 650 MG: 325 SOLUTION ORAL at 16:45

## 2017-04-24 RX ADMIN — PHENYLEPHRINE HYDROCHLORIDE 100 MCG: 10 INJECTION, SOLUTION INTRAMUSCULAR; INTRAVENOUS; SUBCUTANEOUS at 14:58

## 2017-04-24 RX ADMIN — SENNOSIDES A AND B 10 ML: 415.36 LIQUID ORAL at 20:01

## 2017-04-24 ASSESSMENT — PAIN DESCRIPTION - DESCRIPTORS
DESCRIPTORS: SORE
DESCRIPTORS: SORE

## 2017-04-24 ASSESSMENT — LIFESTYLE VARIABLES: TOBACCO_USE: 1

## 2017-04-24 ASSESSMENT — VISUAL ACUITY: OU: NORMAL ACUITY

## 2017-04-24 ASSESSMENT — ENCOUNTER SYMPTOMS: ORTHOPNEA: 0

## 2017-04-24 NOTE — OR NURSING
Pt has edema to his neck and base of neck and to his cheeks/jowls with crepitus noted on palpation to Right side of neck. He also has a red/flat rash to his upper shoulders and upper back.

## 2017-04-24 NOTE — BRIEF OP NOTE
[facility], [place]    Brief Operative Note    Pre-operative diagnosis: Status post esophagectomy  Post-operative diagnosis As above  Procedure: Procedure(s):  Esophagogastroduodenoscopy - Wound Class: II-Clean Contaminated   - Wound Class: II-Clean Contaminated  with removal of pharangostomy tube - Wound Class: IV-Dirty or Infected  Surgeon: Surgeon(s) and Role:     * Jeronimo Mendez MD - Primary     * Isidoro Perez MD- Resident- Assisting   Anesthesia: General   Estimated blood loss: None  Drains: None  Specimens: * No specimens in log *  Findings:   Anastomosis appeared intact.  Complications: None.  Implants: None    - Pharyngostomy removed.

## 2017-04-24 NOTE — PROGRESS NOTES
CLINICAL NUTRITION SERVICES - REASSESSMENT NOTE     Nutrition Prescription    RECOMMENDATIONS FOR MDs/PROVIDERS TO ORDER:  1. Resume TF schedule as soon as medically appropriate    2. Recommend when able to resume TF per provider discretion, switch to maintenance TF formula Isosource 1.5 prior to pt discharge. Start Isosource 1.5 @ 75 mL/hr and adv by 10 mL q4h as tolerated to goal 105 ml/hr and resume 16 hour cycled TF (Isosource 1.5 @ 105 mL/hr x 16 hours (1680 mL/day) to provide 2520 kcal, 114 g PRO, and 1276 mL/day free water).    -- Recommend additional 3 pkts Beneprotein for additional 75 kcal and 18 g PRO.  TF + beneprotein to provide 2595 kcal (34 kcal/kg) and 132 g PRO (1.7 g/kg) which meets 100% estimated kcal and PRO needs per dosing weight 77 kg    Malnutrition Status:    Patient does not meet two of the above criteria necessary for diagnosing malnutrition, but is at risk    Recommendations already ordered by Registered Dietitian (RD):  Weigh patient    Future/Additional Recommendations:  Monitor wt trends vs need to increase/adjust TF provisions   *Obtained information from chart, pt busy then in OR during attempts to visit    EVALUATION OF THE PROGRESS TOWARD GOALS   Diet: NPO.     Nutrition Support: Cycled TF of Impact Peptide 1.5 @ 105 mL/hr x 16 hours (1680 mL/day) from 4 PM - 8 AM which provides 2520 kcal and 158 g PRO which provides 100% estimated kcal and protein needs.  Provides 1293 mL/day free water.    Free Water: 75 mL/hr x 16 hours free water during TF cycle (additional 1200 mL free water daily)    Intake: TF currently off for procedure.  Was on continuous TF (Impact Peptide @ 70 mL/hr).  TF started on 4/17, adv to goal continuous rate (70 mL/hr) on 4/19, cycled on 4/21.  7-day avg TF intake = 905 mL/day Impact Peptide 1.5 which provided 1357 kcal and 85 g PRO which met 57% kcal needs and 74% PRO needs.  Per RN note on 4/22 pt reported concern he was not getting enough TF and wanted RD to  increase TF rate due to his high metabolism (of note, pt's current goal TF regimen meeting 100% of assessed estimated kcal/protein needs)     NEW FINDINGS   Weight: 77.2 kg on 4/23, down 5 kg over the past 4 days and overall down 6.9 kg from admit wt on 4/14 (8.2% wt loss). Unclear if wt loss true wt loss vs fluid related vs scale error vs a combination of any of these possibilities. Will adjust dosing weight to 82 kg (previousl lowest wt on 4/19 before yesterday's weight of 77.2 kg recorded) and reassess estimated nutrition needs below:    REASSESSED NUTRITION NEEDS  Estimated Energy Needs: 6840-7317 kcals/day (30 - 35 kcals/kg)  Justification: Increased needs and Post-op  Estimated Protein Needs: 123-164 grams protein/day (1.5 - 2.0 grams of pro/kg)  Justification: Increased needs and Post-op    Labs: BUN 34 (H), has been elevated since 4/22;  (H) on 4/24, trending down  Meds: 1 pkt Nutrisource fiber daily    MALNUTRITION  % Intake: < 75% for > 7 days (non-severe)  % Weight Loss: meets criteria > 2% in 1 week (severe), however unclear at this time if all true weight loss given how quickly weight went down  Subcutaneous Fat Loss: None observed per RD note 4/17  Muscle Loss: None observed per RD note 4/17  Fluid Accumulation/Edema: None noted per chart review  Malnutrition Diagnosis: Patient does not meet two of the above criteria necessary for diagnosing malnutrition, but is at risk    Previous Goals   Total avg nutritional intake to meet a minimum of 25 kcal/kg and 1.5 g PRO/kg daily (per dosing wt 84 kg).  Evaluation: Not met    Previous Nutrition Diagnosis  Inadequate oral intake related to NPO s/p esophagectomy as evidenced by NPO x 3 days and provider order for RD to assess and order TF.   Evaluation: No change    CURRENT NUTRITION DIAGNOSIS  Inadequate protein-energy intake related to TF advancing to goal this week and TF infusion interruptions as evidenced by TF off x 1 day for procedure and 7-day avg  TF intake meeting 57% kcal needs and 74% PRO needs      INTERVENTIONS  Implementation  Ordered weight    Goals  Total avg nutritional intake to meet a minimum of 30 kcal/kg and 1.5 g PRO/kg daily (per dosing wt 84 kg).    Monitoring/Evaluation  Progress toward goals will be monitored and evaluated per protocol.     Katina Brock RD, LD   7B RD Pager: [phone]

## 2017-04-24 NOTE — PROGRESS NOTES
Thoracic Surgery Progress Note  Jim Braden  [id number]    Subjective  No acute events overnight. Continues to have Subcu emphysema.    Objective  Temp:  [96.2  F (35.7  C)-98.2  F (36.8  C)] 97  F (36.1  C)  Heart Rate:  [] 104  Resp:  [16-18] 16  BP: (126-151)/(73-85) 126/85  SpO2:  [94 %-97 %] 96 %    Physical Exam:  Gen: Awake, alert, NAD  HEENT: Dressing changed, c/d/i, pharyngostomy functioning  CVS: Sinus tachycardia  Resp: NLB on RA, CT without leak  Abd: Soft, nt/nd  Extrem: WWP    UOP adequate  Pharyngostomy 500/0  /80    Cr normal  WBC mildly up 14.9, hgb stable    CXR pending today    Assessment & Plan  56 year old male with history of LE DVT on lovenox, neoadjuvant chemo, s/p thoracoscopic/laparoscpic esophagectomy with cervical anastomosis, J tube, pharyngostomy tube placement 4/14 for lower esophageal adenocarcinoma.      Neuro: Scheduled oxycodone and Tylenol  Resp: PRN NC, aggressive pulm toilet, CT to suction, EGD today. CT chest/abd to assess for source of subQ air today.  Cardio: Holding home lisinopril, MECCA resolved, c/w metoprolol 12.5 mg BID  GI/FEN: NPO, pharyngostomy cares/flushes ordered, tube feeds to goal. Enema today  Renal: Adequate UOP   Endo: No home meds  ID/heme: WBC mildly up, culture if febrile  PPx: Continue LIH gtt, protonix     Seen and discussed with fellow, Dr. Luna.    Corby Palumbo MD  PGY-1 General Surgery  [facility]

## 2017-04-24 NOTE — PLAN OF CARE
Problem: Goal Outcome Summary  Goal: Goal Outcome Summary  PT-7B: Pt leaving for procedure upon initial check in and off unit for the afternoon.  Will reschedule per POC.

## 2017-04-24 NOTE — PLAN OF CARE
Problem: Individualization  Goal: Patient Preferences  Outcome: No Change  Ambulatory/very active per current status. Denied any nausea. Oxycodone for pain. Abdomen appear rounded, hypoactive in all quadrant. Lap sites with no drainage/derma bonded. Chest tube on suction/-20, changed the pleura vac. Inspiratory wheezing, Sat is 94%RA. Neck remain swollen. Pharyngostomy tube on LIS, continue with irrigation as ordered. Plan to stop the tube feeding at night per NPO status for the medical intervention tomorrow. Continue with plan of care.

## 2017-04-24 NOTE — PLAN OF CARE
Problem: Goal Outcome Summary  Goal: Goal Outcome Summary  Outcome: No Change  Pt is AxOx4. VSS. Blood sugars were stable at 125 and 126. Pt was NPO for procedure today since 0000. Pt was taken down to the procedure at 1145. Procedure scheduled for 1230. Wife Abril was here when pt went down. Her number was put in the checklist on EPIC. Heparin drip was stopped at 0958. PIV SL.  His 10A was 0.27. His neck was irrigated x1 only due to him leaving the floor. A UA was collected right before he left the floor. Ct and Chest Xray were done this AM as well. Results pending. PRN Dilaudid was given at 1044 for lower back pain. Pre op aware.   Dunia Rush RN

## 2017-04-24 NOTE — PLAN OF CARE
Problem: Goal Outcome Summary  Goal: Goal Outcome Summary  Outcome: No Change  -110. Triggered lactic acid, resulted 1.0. Pt c/o pain to neck. Prn oxycodone given with scheduled oxy. PRN atarax given for anxiety. Pharyngostomy tube to LIS. Flushed per orders. CT to -20 suction, can be off suction when ambulating. TF turned off at midnight. J tube clamped. Plan for EGD this morning. Heparin gtt at 1450units/hr, 10a 0.14, bolus with 2450 units and increased by 150 units to 1600 units/hr. Face and neck swollen. Appears less anxious tonight, seem to rest in between cares. Walking independently. Voiding adequately. BS checks q4h,  and 131. Continue with POC.

## 2017-04-24 NOTE — ANESTHESIA PREPROCEDURE EVALUATION
Anesthesia Evaluation     . Pt has had prior anesthetic. Type: General and MAC    No history of anesthetic complications          ROS/MED HX    ENT/Pulmonary:     (+)tobacco use, Past use Quit in 2007 packs/day  , . Other pulmonary disease Alpha 1 antitrypsin deficiency carrier.    Neurologic:  - neg neurologic ROS     Cardiovascular:     (+) hypertension-range: 140s/80s, ---. Taking blood thinners Pt has received instructions: Instructions Given to patient: Patient instructed to hold Lovenox in preparation for surgery. . . :. .      (-) JUNIOR and orthopnea/PND   METS/Exercise Tolerance:  >4 METS   Hematologic: Comments: Blood clot in right leg, diagnosed in January/February.  Thought to be related to chemotherapy.    (+) History of blood clots pt is anticoagulated, -      Musculoskeletal:  - neg musculoskeletal ROS       GI/Hepatic:     (+) GERD Asymptomatic on medication,       Renal/Genitourinary:  - ROS Renal section negative       Endo:  - neg endo ROS       Psychiatric:  - neg psychiatric ROS       Infectious Disease:  - neg infectious disease ROS       Malignancy:   (+) Malignancy History of GI  GI CA  Active status post Chemo,         Other:    - neg other ROS                 Physical Exam  Normal systems: cardiovascular and dental    Airway   Mallampati: II  TM distance: >3 FB  Neck ROM: full  Comment: Mildly limited MO.    Dental     Cardiovascular       Pulmonary    breath sounds clear to auscultation  PE comment: subQ emphysema in neck and chest noted                    Anesthesia Plan      History & Physical Review  History and physical reviewed and following examination; no interval change.    ASA Status:  3 .    NPO Status:  > 8 hours    Plan for General, RSI and ETT with Propofol and Intravenous induction. Maintenance will be Balanced.    PONV prophylaxis:  Ondansetron (or other 5HT-3)  Additional equipment: Videolaryngoscope and 2nd IV (pt with 22ga IV only) I discussed the risks and benefits of  general anesthesia with the patient.  Questions were sought and answered.      Bernard Sales MD  Attending Anesthesiologist        Postoperative Care  Postoperative pain management:  IV analgesics.      Consents                          .

## 2017-04-24 NOTE — ANESTHESIA CARE TRANSFER NOTE
Patient: Jim Braden    Procedure(s):  Esophagogastroduodenoscopy - Wound Class: II-Clean Contaminated   - Wound Class: II-Clean Contaminated  with removal of pharangostomy tube - Wound Class: IV-Dirty or Infected    Diagnosis: status post esophagectomy  Diagnosis Additional Information: No value filed.    Anesthesia Type:   General, RSI, ETT     Note:  Airway :Face Mask  Patient transferred to:PACU  Comments: Sleepy, awakens easily to verbal stimuli, responding appropriately. Stable, report to RN.      Vitals: (Last set prior to Anesthesia Care Transfer)    CRNA VITALS  4/24/2017 1446 - 4/24/2017 1520      4/24/2017             Pulse: 113    SpO2: 100 %    Resp Rate (set): 10                Electronically Signed By: TOMA Galicia CRNA  April 24, 2017  3:20 PM

## 2017-04-24 NOTE — ANESTHESIA POSTPROCEDURE EVALUATION
Patient: Jim Braden    Procedure(s):  Esophagogastroduodenoscopy - Wound Class: II-Clean Contaminated   - Wound Class: II-Clean Contaminated  with removal of pharangostomy tube - Wound Class: IV-Dirty or Infected    Diagnosis:status post esophagectomy  Diagnosis Additional Information: No value filed.    Anesthesia Type:  General, RSI, ETT    Note:  Anesthesia Post Evaluation    Patient location during evaluation: PACU  Patient participation: Able to fully participate in evaluation  Level of consciousness: awake and alert  Pain management: adequate  Airway patency: patent  Cardiovascular status: acceptable, tachycardic and hemodynamically stable  Respiratory status: acceptable and nasal cannula  Hydration status: acceptable  PONV: none     Anesthetic complications: None          Last vitals:  Vitals:    04/24/17 1330 04/24/17 1519 04/24/17 1530   BP:  (!) 169/92 (!) 162/94   Pulse:      Resp:  16 15   Temp:  37.4  C (99.4  F)    SpO2: 92% 95% 92%         Electronically Signed By: David Mitchell MD  April 24, 2017  3:52 PM

## 2017-04-25 ENCOUNTER — APPOINTMENT (OUTPATIENT)
Dept: SPEECH THERAPY | Facility: CLINIC | Age: 57
DRG: 357 | End: 2017-04-25
Attending: THORACIC SURGERY (CARDIOTHORACIC VASCULAR SURGERY)
Payer: COMMERCIAL

## 2017-04-25 ENCOUNTER — APPOINTMENT (OUTPATIENT)
Dept: GENERAL RADIOLOGY | Facility: CLINIC | Age: 57
DRG: 357 | End: 2017-04-25
Attending: THORACIC SURGERY (CARDIOTHORACIC VASCULAR SURGERY)
Payer: COMMERCIAL

## 2017-04-25 LAB
ANION GAP SERPL CALCULATED.3IONS-SCNC: 11 MMOL/L (ref 3–14)
BUN SERPL-MCNC: 40 MG/DL (ref 7–30)
CALCIUM SERPL-MCNC: 9.2 MG/DL (ref 8.5–10.1)
CHLORIDE SERPL-SCNC: 108 MMOL/L (ref 94–109)
CO2 SERPL-SCNC: 24 MMOL/L (ref 20–32)
COPATH REPORT: NORMAL
CREAT SERPL-MCNC: 0.66 MG/DL (ref 0.66–1.25)
ERYTHROCYTE [DISTWIDTH] IN BLOOD BY AUTOMATED COUNT: 14.7 % (ref 10–15)
GFR SERPL CREATININE-BSD FRML MDRD: ABNORMAL ML/MIN/1.7M2
GLUCOSE BLDC GLUCOMTR-MCNC: 113 MG/DL (ref 70–99)
GLUCOSE BLDC GLUCOMTR-MCNC: 128 MG/DL (ref 70–99)
GLUCOSE BLDC GLUCOMTR-MCNC: 129 MG/DL (ref 70–99)
GLUCOSE BLDC GLUCOMTR-MCNC: 129 MG/DL (ref 70–99)
GLUCOSE BLDC GLUCOMTR-MCNC: 163 MG/DL (ref 70–99)
GLUCOSE SERPL-MCNC: 194 MG/DL (ref 70–99)
HCT VFR BLD AUTO: 35 % (ref 40–53)
HGB BLD-MCNC: 11 G/DL (ref 13.3–17.7)
LACTATE BLD-SCNC: 0.6 MMOL/L (ref 0.7–2.1)
LMWH PPP CHRO-ACNC: 0.1 IU/ML
LMWH PPP CHRO-ACNC: 0.15 IU/ML
LMWH PPP CHRO-ACNC: 0.16 IU/ML
MCH RBC QN AUTO: 31.4 PG (ref 26.5–33)
MCHC RBC AUTO-ENTMCNC: 31.4 G/DL (ref 31.5–36.5)
MCV RBC AUTO: 100 FL (ref 78–100)
PLATELET # BLD AUTO: 456 10E9/L (ref 150–450)
POTASSIUM SERPL-SCNC: 4.6 MMOL/L (ref 3.4–5.3)
RADIOLOGIST FLAGS: ABNORMAL
RBC # BLD AUTO: 3.5 10E12/L (ref 4.4–5.9)
SODIUM SERPL-SCNC: 143 MMOL/L (ref 133–144)
WBC # BLD AUTO: 16.4 10E9/L (ref 4–11)

## 2017-04-25 PROCEDURE — 00000146 ZZHCL STATISTIC GLUCOSE BY METER IP

## 2017-04-25 PROCEDURE — 25000132 ZZH RX MED GY IP 250 OP 250 PS 637: Performed by: SURGERY

## 2017-04-25 PROCEDURE — 74230 X-RAY XM SWLNG FUNCJ C+: CPT

## 2017-04-25 PROCEDURE — 85520 HEPARIN ASSAY: CPT | Performed by: SURGERY

## 2017-04-25 PROCEDURE — 85520 HEPARIN ASSAY: CPT | Performed by: THORACIC SURGERY (CARDIOTHORACIC VASCULAR SURGERY)

## 2017-04-25 PROCEDURE — 80048 BASIC METABOLIC PNL TOTAL CA: CPT | Performed by: SURGERY

## 2017-04-25 PROCEDURE — 25000132 ZZH RX MED GY IP 250 OP 250 PS 637: Performed by: STUDENT IN AN ORGANIZED HEALTH CARE EDUCATION/TRAINING PROGRAM

## 2017-04-25 PROCEDURE — 71020 XR CHEST 2 VW: CPT

## 2017-04-25 PROCEDURE — 25000128 H RX IP 250 OP 636: Performed by: SURGERY

## 2017-04-25 PROCEDURE — 36415 COLL VENOUS BLD VENIPUNCTURE: CPT | Performed by: SURGERY

## 2017-04-25 PROCEDURE — 71020 XR CHEST 2 VW: CPT | Mod: 77

## 2017-04-25 PROCEDURE — 92611 MOTION FLUOROSCOPY/SWALLOW: CPT | Mod: GN | Performed by: SPEECH-LANGUAGE PATHOLOGIST

## 2017-04-25 PROCEDURE — 40000225 ZZH STATISTIC SLP WARD VISIT: Performed by: SPEECH-LANGUAGE PATHOLOGIST

## 2017-04-25 PROCEDURE — 25000132 ZZH RX MED GY IP 250 OP 250 PS 637: Performed by: PHYSICIAN ASSISTANT

## 2017-04-25 PROCEDURE — 36415 COLL VENOUS BLD VENIPUNCTURE: CPT | Performed by: THORACIC SURGERY (CARDIOTHORACIC VASCULAR SURGERY)

## 2017-04-25 PROCEDURE — 92526 ORAL FUNCTION THERAPY: CPT | Mod: GN | Performed by: SPEECH-LANGUAGE PATHOLOGIST

## 2017-04-25 PROCEDURE — 25000132 ZZH RX MED GY IP 250 OP 250 PS 637: Performed by: THORACIC SURGERY (CARDIOTHORACIC VASCULAR SURGERY)

## 2017-04-25 PROCEDURE — 85027 COMPLETE CBC AUTOMATED: CPT | Performed by: SURGERY

## 2017-04-25 PROCEDURE — 83605 ASSAY OF LACTIC ACID: CPT | Performed by: THORACIC SURGERY (CARDIOTHORACIC VASCULAR SURGERY)

## 2017-04-25 PROCEDURE — 27210437 ZZH NUTRITION PRODUCT SEMIELEM INTERMED LITER

## 2017-04-25 PROCEDURE — 12000008 ZZH R&B INTERMEDIATE UMMC

## 2017-04-25 RX ADMIN — OXYCODONE HYDROCHLORIDE 5 MG: 5 SOLUTION ORAL at 00:59

## 2017-04-25 RX ADMIN — MULTIVITAMIN 15 ML: LIQUID ORAL at 07:51

## 2017-04-25 RX ADMIN — CHLORHEXIDINE GLUCONATE 15 ML: 1.2 RINSE ORAL at 07:52

## 2017-04-25 RX ADMIN — SENNOSIDES A AND B 10 ML: 415.36 LIQUID ORAL at 07:51

## 2017-04-25 RX ADMIN — ACETAMINOPHEN 650 MG: 325 SOLUTION ORAL at 20:41

## 2017-04-25 RX ADMIN — METOPROLOL TARTRATE 12.5 MG: 100 TABLET ORAL at 20:37

## 2017-04-25 RX ADMIN — PANTOPRAZOLE SODIUM 40 MG: 40 TABLET, DELAYED RELEASE ORAL at 20:37

## 2017-04-25 RX ADMIN — ACETAMINOPHEN 650 MG: 325 SOLUTION ORAL at 09:58

## 2017-04-25 RX ADMIN — OXYCODONE HYDROCHLORIDE 5 MG: 5 SOLUTION ORAL at 12:20

## 2017-04-25 RX ADMIN — OXYCODONE HYDROCHLORIDE 5 MG: 5 SOLUTION ORAL at 04:21

## 2017-04-25 RX ADMIN — INSULIN ASPART 2 UNITS: 100 INJECTION, SOLUTION INTRAVENOUS; SUBCUTANEOUS at 07:50

## 2017-04-25 RX ADMIN — PANTOPRAZOLE SODIUM 40 MG: 40 TABLET, DELAYED RELEASE ORAL at 07:52

## 2017-04-25 RX ADMIN — INSULIN ASPART 1 UNITS: 100 INJECTION, SOLUTION INTRAVENOUS; SUBCUTANEOUS at 04:21

## 2017-04-25 RX ADMIN — OXYCODONE HYDROCHLORIDE 5 MG: 5 SOLUTION ORAL at 16:27

## 2017-04-25 RX ADMIN — SENNOSIDES A AND B 10 ML: 415.36 LIQUID ORAL at 20:37

## 2017-04-25 RX ADMIN — HEPARIN SODIUM 1750 UNITS/HR: 10000 INJECTION, SOLUTION INTRAVENOUS at 07:39

## 2017-04-25 RX ADMIN — OXYCODONE HYDROCHLORIDE 5 MG: 5 SOLUTION ORAL at 20:37

## 2017-04-25 RX ADMIN — OXYCODONE HYDROCHLORIDE 5 MG: 5 SOLUTION ORAL at 07:51

## 2017-04-25 RX ADMIN — OXYCODONE HYDROCHLORIDE 5 MG: 5 SOLUTION ORAL at 09:58

## 2017-04-25 RX ADMIN — ANTISEPTIC SURGICAL SCRUB: 0.04 SOLUTION TOPICAL at 08:04

## 2017-04-25 RX ADMIN — OXYCODONE HYDROCHLORIDE 5 MG: 5 SOLUTION ORAL at 14:23

## 2017-04-25 RX ADMIN — BISACODYL 10 MG: 10 SUPPOSITORY RECTAL at 07:59

## 2017-04-25 RX ADMIN — METOPROLOL TARTRATE 12.5 MG: 100 TABLET ORAL at 07:52

## 2017-04-25 RX ADMIN — OXYCODONE HYDROCHLORIDE 5 MG: 5 SOLUTION ORAL at 20:41

## 2017-04-25 ASSESSMENT — VISUAL ACUITY
OU: NORMAL ACUITY
OU: NORMAL ACUITY

## 2017-04-25 ASSESSMENT — PAIN DESCRIPTION - DESCRIPTORS: DESCRIPTORS: ACHING

## 2017-04-25 NOTE — PLAN OF CARE
Problem: Individualization  Goal: Patient Preferences  Outcome: No Change  Vitals:     04/24/17 2217 04/25/17 0047 04/25/17 0400 04/25/17 0700   BP: 119/67 124/58 145/73 141/70   BP Location: Left arm Left arm       Cuff Size:           Pulse:           Resp: 16 18 18 18   Temp: 96.8  F (36  C) 98.9  F (37.2  C) 95.6  F (35.3  C) 97.1  F (36.2  C)   TempSrc: Oral Oral Oral Oral   SpO2: 93% 93% 93% 94%   Weight:           Height:           Heparin drip on hold per current orders.

## 2017-04-25 NOTE — PROGRESS NOTES
"POST OP CHECK    Pt seen at bedside resting comfortably.  No acute complaints.  No SOB.     /73 (BP Location: Right arm)  Pulse 108  Temp 98.2  F (36.8  C) (Oral)  Resp 14  Ht 1.803 m (5' 11\")  Wt 76.9 kg (169 lb 8 oz)  SpO2 95%  BMI 23.64 kg/m2  A&Ox3, NAD  subq emphysema. Neck incision c/d/i   Breathing non-labored    CT with SS output no air leak    A/P: Jim Braden is a 56 year old male POD# 0 s/p EGD, pharyngostomy removal. Patient doing well. Continue cares per primary team.   Please page on call resident with any questions or concerns.     Ryan Spring MD  PGY-1, General Surgery  [phone]      "

## 2017-04-25 NOTE — PLAN OF CARE
Problem: Goal Outcome Summary  Goal: Goal Outcome Summary  SLP: Pt seen bedside for video swallow study per MD orders. Pt presents with mild to moderate oropharyngeal dysphagia under flouroscopy. Pharyngostomy tube removed this date; neck edema continues to be present. Pt exhibited persistent penetration upon trials of thin liquid via spoon, cup, and straw. Improved airway protection noted with use of chin tuck. Consecutive sips via straw resulted in aspiration. Pt is appropriate for participation in esophagram when taking single sips with use of a chin tuck. From an oropharyngeal standpoint, pt is appropriate for advancement to clear liquids when taking single sips and tucking chin; formal orders per Thoracic MD team pending the results of esophagram. ST to follow for diet tolerance, pharyngeal strengthening, and compliance with strategies.

## 2017-04-25 NOTE — PLAN OF CARE
Problem: Individualization  Goal: Patient Preferences  Afeb, vitals stable, 02 sats 93% on room air, states does have back pain, on scheduled oxycodone [id number] with relief, abd lap sites dry,intact and dermabonded, belly round with positive bowel sounds, had 1 stool thru the night, lungs coarse, strong cough, cycled tube feeds til 0800, CT to suction, 60cc of serous fluid out since midnight, iv infusing, heparin gtt at 1600units/hour, 0130 10a level 0.15, no change in gtt rate, left neck drsg with serosang drng, no increase this shift, up walking in halls and sitting in chair tonight, offers no further c/o, appeared to rest at intervals

## 2017-04-25 NOTE — PROGRESS NOTES
04/25/17 1413   General Information   Onset Date 04/14/17   Start of Care Date 04/25/17   Referring Physician Glenroy Palumbo MD   Patient Profile Review/OT: Additional Occupational Profile Info See Profile for full history and prior level of function   Patient/Family Goals Statement Pt would like to drink.     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  CT chest/abd to assess for source of subQ air yesterday inconclusive for source.  EGD yesterday with no evidence of leak.  Clinically doing well.  Prior video swallow study completed 4/21/2017 with penetration and aspiration of residuals noted.  Pt is now s/p pharyngostomy tube removal.     VFSS Eval: Radiology   Radiologist [facility] Resident    Views Taken left lateral   Physical Location of Procedure [facility] Radiology #4    VFSS Eval: Thin Liquid Texture Trial   Mode of Presentation, Thin Liquid cup;spoon;straw;self-fed;fed by clinician   Order of Presentation 1, 2, 3, 4, 5, 6, 7, 8, 9, 10   Preparatory Phase WFL   Oral Phase, Thin Liquid Premature pharyngeal entry   Pharyngeal Phase, Thin Liquid Residue in valleculae   Rosenbek's Penetration Aspiration Scale: Thin Liquid Trial Results 7 - contrast passes glottis, visible subglottic residue remains despite patient's response (aspiration)   Response to Aspiration other (see comments)  (Productive in clearing penetration )   Successful Strategies Trialed During Procedure, Thin Liquid chin tuck   Diagnostic Statement Persistent penetration which reduced with use of chin tuck strategy.  Aspiration occuring on consecutive sips via straw.     Swallow  Compensations   Swallow Compensations Pacing;Reduce amounts;Chin tuck   Esophageal Phase of Swallow   Esophageal sweep performed during today s vidofluoroscopic exam  Please refer to radiologist's report for details   General Therapy Interventions   Planned Therapy Interventions Dysphagia Treatment   Dysphagia treatment Oropharyngeal exercise training   Swallow Eval: Clinical Impressions   Skilled Criteria for Therapy Intervention Skilled criteria met.  Treatment indicated.   Functional Assessment Scale (FAS) 4   Treatment Diagnosis Mild to moderate oropharyngeal dysphagia    Diet texture recommendations Thin liquids  (Pending esophagram results )   Recommended Feeding/Eating Techniques small sips/bites;tuck chin during every swallow   Therapy Frequency 5 times/wk   Predicted Duration of Therapy Intervention (days/wks) 2 weeks   Anticipated Discharge Disposition home w/ assist   Risks and Benefits of Treatment have been explained. Yes   Patient, family and/or staff in agreement with Plan of Care Yes   Clinical Impression Comments Pt seen bedside for video swallow study per MD orders.  Pt presents with mild to moderate oropharyngeal dysphagia under flouroscopy.  Pharyngostomy tube removed this date; neck edema continues to be present.  Pt exhibited persistent penetration upon trials of thin liquid via spoon, cup, and straw.  Improved airway protection noted with use of chin tuck.  Consecutive sips via straw resulted in aspiration.  Pt is appropriate for participation in esophagram when taking single sips with use of a chin tuck.  From an oropharyngeal standpoint, pt is appropriate for advancement to clear liquids when taking single sips and tucking chin; formal orders per Thoracic MD team pending the results of esophagram.  ST to follow for diet tolerance, pharyngeal strengthening, and compliance with strategies.     Total Evaluation Time   Total Evaluation Time (Minutes) 15

## 2017-04-25 NOTE — ADDENDUM NOTE
Addendum  created 04/25/17 1007 by Ghazala Solo APRN CRNA    Anesthesia Event edited, Procedure Event Log accessed

## 2017-04-25 NOTE — PLAN OF CARE
"Problem: Individualization  Goal: Patient Preferences  Outcome: No Change  Blood pressure 141/70, pulse 108, temperature 97.1  F (36.2  C), temperature source Oral, resp. rate 18, height 1.803 m (5' 11\"), weight 76.9 kg (169 lb 8 oz), SpO2 94 %.  Restarted heparin drip at 1300. Pt left for swallow evale at 1300. CT taken out. CXR scheduled for 1600. MIV dc/ed. Continue with current cares.      "

## 2017-04-25 NOTE — PLAN OF CARE
Problem: Goal Outcome Summary  Goal: Goal Outcome Summary  Outcome: No Change  Vitals:     04/24/17 1958 04/24/17 2028 04/24/17 2104 04/24/17 2217   BP: 132/73   101/48 119/67   BP Location: Right arm   Right arm Left arm   Cuff Size:           Pulse:           Resp:     16 16   Temp:     96.3  F (35.7  C) 96.8  F (36  C)   TempSrc:     Axillary Oral   SpO2: 95% 94% 93% 93%   Weight:           Height:             Pt arrived to floor from PACU at 1700. O2 sats in 90s on RA. Pt's pain controlled with scheduled oxycodone 5 mg x1 and prn oxycodone and tylenol x2. Given zofran x1 with effective relief. Cycled tube feedings running at 105 cc/hr via J-tube. Chest tube to -20 suction with 50 ml of serosanguious output. Abdominal lap sites CDI. Neck dressing CDI. 1930 10a<0.1. Heparin drip restarted at 1600 units/hr, ok'd by pharmacist. Recheck 10a scheduled for 0130. Voiding adequate amounts. LR infusing at 100 cc/hr. Up with SBA. Continue with plan of care.

## 2017-04-25 NOTE — PROGRESS NOTES
Care Coordinator- Discharge Planning     Admission Date/Time:  4/14/2017  Attending MD:  Jeronimo Mendez*     Data  Date of initial CC assessment: 4/17/2017  Chart reviewed, discussed with interdisciplinary team.   Patient was admitted for:   1. History of esophagectomy         Assessment  Full assessment completed in previous note    Coordination of Care and Referrals: Provided patient/family with options for DME and Home Infusion.    Per [facility] patient does not have coverage for any tube feeding supplies or formula and would be private pay for services through them.  Met with patient to discuss discharge planning.  Patient asked that we would call different DME companies/Infusion agencies to get self pay quotes.  After discussing different options patient agreed for referrals to be made to [facility](P: [phone], F:[phone]) and [facility]  Called enteral nutrition liaison, Sabra(P: [phone]), and made a referral.  [facility] checked patients benefits and they reported that patient does not have coverage for formula but would have 80% coverage for supplies, but they are out of network for this patient insurance so would not be able to service him.  If he would self pay with [facility] for supplies and formula it would be approximately $776/month.  Called [facility](P: [phone], F: [phone]) and spoke with , Ghazala, who reports they are in network for his insurance.  She will call insurance and put together a monthly quote for the patient.  Spoke with patient and patients wife, Abril, about current options.  Will let the patient and wife know the monthly quote from [facility] and then the patient can make a decision of who he would like to go with for service.  CC will continue to follow.       Plan  Anticipated Discharge Date:  1-2 days  Anticipated Discharge Plan:  Home with tube feeding support, agency TBD    Surekha Arriaga  [facility]  [phone]

## 2017-04-25 NOTE — PLAN OF CARE
Problem: Goal Outcome Summary  Goal: Goal Outcome Summary  PT 7B: Cancel. Pt OOR at procedure when session attempted, unable to check-back in late PM.

## 2017-04-25 NOTE — PROGRESS NOTES
THORACIC & FOREGUT SURGERY    S:  No acute overnight events.  Pt seen at bedside resting comfortably.  Stable subQ air.    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  CT chest/abd to assess for source of subQ air yesterday inconclusive for source.  EGD yesterday with no evidence of leak.  Clinically doing well.  -Chest tube out today  -Video swallow and esophogram today  -Diet advancement once results of above test are complete  -Therapeutic lovenox likely tomorrow  -Continue daily WBC for slow rise (16.4 today)    Maxime Rosa PA-C  Thoracic Surgery

## 2017-04-26 ENCOUNTER — APPOINTMENT (OUTPATIENT)
Dept: PHYSICAL THERAPY | Facility: CLINIC | Age: 57
DRG: 357 | End: 2017-04-26
Attending: THORACIC SURGERY (CARDIOTHORACIC VASCULAR SURGERY)
Payer: COMMERCIAL

## 2017-04-26 ENCOUNTER — APPOINTMENT (OUTPATIENT)
Dept: SPEECH THERAPY | Facility: CLINIC | Age: 57
DRG: 357 | End: 2017-04-26
Attending: THORACIC SURGERY (CARDIOTHORACIC VASCULAR SURGERY)
Payer: COMMERCIAL

## 2017-04-26 ENCOUNTER — APPOINTMENT (OUTPATIENT)
Dept: GENERAL RADIOLOGY | Facility: CLINIC | Age: 57
DRG: 357 | End: 2017-04-26
Attending: THORACIC SURGERY (CARDIOTHORACIC VASCULAR SURGERY)
Payer: COMMERCIAL

## 2017-04-26 LAB
ANION GAP SERPL CALCULATED.3IONS-SCNC: 7 MMOL/L (ref 3–14)
BUN SERPL-MCNC: 38 MG/DL (ref 7–30)
CALCIUM SERPL-MCNC: 8.8 MG/DL (ref 8.5–10.1)
CHLORIDE SERPL-SCNC: 107 MMOL/L (ref 94–109)
CO2 SERPL-SCNC: 26 MMOL/L (ref 20–32)
COPATH REPORT: NORMAL
CREAT SERPL-MCNC: 0.63 MG/DL (ref 0.66–1.25)
ERYTHROCYTE [DISTWIDTH] IN BLOOD BY AUTOMATED COUNT: 14.7 % (ref 10–15)
GFR SERPL CREATININE-BSD FRML MDRD: ABNORMAL ML/MIN/1.7M2
GLUCOSE BLDC GLUCOMTR-MCNC: 107 MG/DL (ref 70–99)
GLUCOSE BLDC GLUCOMTR-MCNC: 116 MG/DL (ref 70–99)
GLUCOSE BLDC GLUCOMTR-MCNC: 121 MG/DL (ref 70–99)
GLUCOSE BLDC GLUCOMTR-MCNC: 135 MG/DL (ref 70–99)
GLUCOSE BLDC GLUCOMTR-MCNC: 138 MG/DL (ref 70–99)
GLUCOSE SERPL-MCNC: 171 MG/DL (ref 70–99)
HCT VFR BLD AUTO: 30.9 % (ref 40–53)
HGB BLD-MCNC: 9.7 G/DL (ref 13.3–17.7)
LACTATE BLD-SCNC: 0.8 MMOL/L (ref 0.7–2.1)
LMWH PPP CHRO-ACNC: 0.2 IU/ML
MCH RBC QN AUTO: 31.1 PG (ref 26.5–33)
MCHC RBC AUTO-ENTMCNC: 31.4 G/DL (ref 31.5–36.5)
MCV RBC AUTO: 99 FL (ref 78–100)
PLATELET # BLD AUTO: 436 10E9/L (ref 150–450)
POTASSIUM SERPL-SCNC: 4.2 MMOL/L (ref 3.4–5.3)
RBC # BLD AUTO: 3.12 10E12/L (ref 4.4–5.9)
SODIUM SERPL-SCNC: 140 MMOL/L (ref 133–144)
WBC # BLD AUTO: 12.4 10E9/L (ref 4–11)

## 2017-04-26 PROCEDURE — 71020 XR CHEST 2 VW: CPT

## 2017-04-26 PROCEDURE — 97116 GAIT TRAINING THERAPY: CPT | Mod: GP

## 2017-04-26 PROCEDURE — 12000008 ZZH R&B INTERMEDIATE UMMC

## 2017-04-26 PROCEDURE — 83605 ASSAY OF LACTIC ACID: CPT | Performed by: THORACIC SURGERY (CARDIOTHORACIC VASCULAR SURGERY)

## 2017-04-26 PROCEDURE — 00000146 ZZHCL STATISTIC GLUCOSE BY METER IP

## 2017-04-26 PROCEDURE — 27210429 ZZH NUTRITION PRODUCT INTERMEDIATE LITER

## 2017-04-26 PROCEDURE — 80048 BASIC METABOLIC PNL TOTAL CA: CPT | Performed by: SURGERY

## 2017-04-26 PROCEDURE — 71020 XR CHEST 2 VW: CPT | Mod: 77

## 2017-04-26 PROCEDURE — 36415 COLL VENOUS BLD VENIPUNCTURE: CPT | Performed by: SURGERY

## 2017-04-26 PROCEDURE — 85027 COMPLETE CBC AUTOMATED: CPT | Performed by: SURGERY

## 2017-04-26 PROCEDURE — 40000225 ZZH STATISTIC SLP WARD VISIT

## 2017-04-26 PROCEDURE — 25000132 ZZH RX MED GY IP 250 OP 250 PS 637: Performed by: PHYSICIAN ASSISTANT

## 2017-04-26 PROCEDURE — 40000193 ZZH STATISTIC PT WARD VISIT

## 2017-04-26 PROCEDURE — 92526 ORAL FUNCTION THERAPY: CPT | Mod: GN

## 2017-04-26 PROCEDURE — 25000132 ZZH RX MED GY IP 250 OP 250 PS 637: Performed by: THORACIC SURGERY (CARDIOTHORACIC VASCULAR SURGERY)

## 2017-04-26 PROCEDURE — 97530 THERAPEUTIC ACTIVITIES: CPT | Mod: GP

## 2017-04-26 PROCEDURE — 36415 COLL VENOUS BLD VENIPUNCTURE: CPT | Performed by: THORACIC SURGERY (CARDIOTHORACIC VASCULAR SURGERY)

## 2017-04-26 PROCEDURE — 25000132 ZZH RX MED GY IP 250 OP 250 PS 637: Performed by: SURGERY

## 2017-04-26 PROCEDURE — 25000128 H RX IP 250 OP 636: Performed by: SURGERY

## 2017-04-26 PROCEDURE — 97110 THERAPEUTIC EXERCISES: CPT | Mod: GP

## 2017-04-26 PROCEDURE — 85520 HEPARIN ASSAY: CPT | Performed by: SURGERY

## 2017-04-26 RX ADMIN — METOPROLOL TARTRATE 12.5 MG: 100 TABLET ORAL at 20:19

## 2017-04-26 RX ADMIN — HEPARIN SODIUM 1750 UNITS/HR: 10000 INJECTION, SOLUTION INTRAVENOUS at 18:26

## 2017-04-26 RX ADMIN — OXYCODONE HYDROCHLORIDE 5 MG: 5 SOLUTION ORAL at 16:22

## 2017-04-26 RX ADMIN — OXYCODONE HYDROCHLORIDE 5 MG: 5 SOLUTION ORAL at 20:13

## 2017-04-26 RX ADMIN — ANTISEPTIC SURGICAL SCRUB: 0.04 SOLUTION TOPICAL at 09:02

## 2017-04-26 RX ADMIN — HEPARIN SODIUM 1750 UNITS/HR: 10000 INJECTION, SOLUTION INTRAVENOUS at 01:12

## 2017-04-26 RX ADMIN — PANTOPRAZOLE SODIUM 40 MG: 40 TABLET, DELAYED RELEASE ORAL at 09:01

## 2017-04-26 RX ADMIN — INSULIN ASPART 1 UNITS: 100 INJECTION, SOLUTION INTRAVENOUS; SUBCUTANEOUS at 09:02

## 2017-04-26 RX ADMIN — OXYCODONE HYDROCHLORIDE 5 MG: 5 SOLUTION ORAL at 23:53

## 2017-04-26 RX ADMIN — MULTIVITAMIN 15 ML: LIQUID ORAL at 09:01

## 2017-04-26 RX ADMIN — ACETAMINOPHEN 650 MG: 325 SOLUTION ORAL at 20:13

## 2017-04-26 RX ADMIN — PANTOPRAZOLE SODIUM 40 MG: 40 TABLET, DELAYED RELEASE ORAL at 20:18

## 2017-04-26 RX ADMIN — OXYCODONE HYDROCHLORIDE 5 MG: 5 SOLUTION ORAL at 09:01

## 2017-04-26 RX ADMIN — OXYCODONE HYDROCHLORIDE 5 MG: 5 SOLUTION ORAL at 12:15

## 2017-04-26 RX ADMIN — CHLORHEXIDINE GLUCONATE 15 ML: 1.2 RINSE ORAL at 09:01

## 2017-04-26 RX ADMIN — OXYCODONE HYDROCHLORIDE 5 MG: 5 SOLUTION ORAL at 01:11

## 2017-04-26 RX ADMIN — OXYCODONE HYDROCHLORIDE 5 MG: 5 SOLUTION ORAL at 04:20

## 2017-04-26 RX ADMIN — SENNOSIDES A AND B 5 ML: 415.36 LIQUID ORAL at 20:18

## 2017-04-26 RX ADMIN — METOPROLOL TARTRATE 12.5 MG: 100 TABLET ORAL at 09:01

## 2017-04-26 RX ADMIN — ANTISEPTIC SURGICAL SCRUB: 0.04 SOLUTION TOPICAL at 20:18

## 2017-04-26 NOTE — PROGRESS NOTES
CLINICAL NUTRITION SERVICES - BRIEF NOTE     Nutrition Prescription    RECOMMENDATIONS FOR MDs/PROVIDERS TO ORDER:  Adjust free water per team discretion    Recommendations already ordered by Registered Dietitian (RD):  Recommend switch to maintenance TF formula Isosource 1.5 prior to pt discharge. Start Isosource 1.5 @ 80 mL/hr and adv by 10 mL q4h as tolerated to goal 110 ml/hr and resume 16 hour cycled TF (Isosource 1.5 @ 110 mL/hr x 16 hours (1760 mL/day) to provide 2640 kcal, 119 g PRO, and 1338 mL/day free water).   -- If pt prefers feed-and-flush style TF/Free water administration, can run free water at 65 mL/hr x 16 hours (1040 mL/day) during TF administration so TF + free water to provide 2378 mL free water daily    Future/Additional Recommendations:   -- If pt prefers to due free water flushes during the day to get free water, recommend 150 mL water flushes 6 times per day + 60 mL water flushes before and after TF cycle (additional 1020 mL water per day.  TF + free water flushes + free water given with BID beneprotein admin  = 2358 mL/day free water (31 mL/kg)   - TF: Impact Peptide 1.5 @ 105 mL/hr x 16 hours + free water @ 75 mL/hr x 16 hours during TF cycle  - Weight: has consistently been ~77 kg since 4/23.  Will adjust dosing weight to 77 kg and reassess estimated nutrition needs below:    REASSESSED NUTRITION NEEDS  Estimated Energy Needs: 9014-5937 kcals/day (30 - 35 kcals/kg)  Justification: Increased needs and Post-op  Estimated Protein Needs: 116-154 grams protein/day (1.5 - 2.0+ grams of pro/kg)  Justification: Increased needs and Post-op  Estimated Fluid Needs: 1 mL/kcal or per provider    INTERVENTIONS  Implementation  Collaboration with other providers: alerted by unit care coordinator that pt discharging soon and will have to pay for TF formula out of pocket, asked if appropriate to switch to a more mainteance TF formula (writer advised yes and there are recs for this is last RD note on  4/24), discussed TF formula changes with patient. Discussed that pt can either do free water as ordered (65 mL/hr x 16 hours during TF administration) or can do free water flushes during the day (see recs above), pt would prefer water to be run overnight as ordered  Enteral Nutrition - Modify composition  Feeding tube flush    Monitoring/Evaluation  Progress toward goals will be monitored and evaluated per protocol.     Katina Brock, RAUL, LD   [address]B RD Pager: [phone]

## 2017-04-26 NOTE — PLAN OF CARE
Problem: Goal Outcome Summary  Goal: Goal Outcome Summary  Outcome: Improving  Vitals:     04/25/17 1727 04/25/17 1845 04/25/17 2036 04/25/17 2110   BP: 125/74 121/67 130/75 106/57   BP Location: Right arm Right arm Right arm Left arm   Cuff Size:           Pulse:           Resp: 18 18 18   Temp: 96.9  F (36.1  C) 99  F (37.2  C)   98.1  F (36.7  C)   TempSrc: Oral Oral   Oral   SpO2: 92% 96% 93% 93%   Weight: 77.1 kg (170 lb)         Height:             Pt's pain controlled with scheduled oxycodone and prn oxycodone and prn tylenol. Old CT site CDI. Tolerating cycled tube feedings at 105 ml/hr via J-tube. BGs 113 and 129. Heparin drip running at 1750 cc/hr. Abdominal lap sites CDI. Neck dressing CDI. Lactic triggered at start of shift, 0.6. Voiding, not saving. BM. Up with SBA. Continue with plan of care.

## 2017-04-26 NOTE — PLAN OF CARE
Problem: Goal Outcome Summary  Goal: Goal Outcome Summary  PT: Pt able to demo ambulating 1000'+ with IV pole or without, no overt LOB, mild fatigue and standing rest breaks help. Pt asc/desc 9 stairs with 1 rail progressing SBA to mod I. Pt issued HEP with proximal LE strengthening to continue to progress strength at home, declined balance exercises as hip was quite sore after. Pt is moving safely, ambulates I on units. Pt safe to discharge to home when medically stable. Pt has met PT goals, will discharge from PT.      Physical Therapy Discharge Summary     Reason for therapy discharge:    All goals and outcomes met, no further needs identified.     Progress towards therapy goal(s). See goals on Care Plan in [facility] electronic health record for goal details.  Goals met     Therapy recommendation(s):    Continue home exercise program.

## 2017-04-26 NOTE — PLAN OF CARE
Problem: Goal Outcome Summary  Goal: Goal Outcome Summary  ST 7B: Will continue to defer to thoracic team for initiation of clear liquid diet as appropriate from surgical standpoint. If diet initiated, Pt to sit upright and tuck chin with each swallow. Encourage completion of pharyngeal strengthening exercises. ST to follow; likely no ongoing ST needs post discharge.

## 2017-04-26 NOTE — PLAN OF CARE
Problem: Individualization  Goal: Patient Preferences  Afeb, vitals stable, 02 sats 93% on room air, scheduled oxycodone with relief, no requests for more pain meds, abd incisions dry and intact, belly soft with positive bowel sounds, lungs coarse, strong cough, glucoses 136 and 135, no coveraged needed, up walking in halls x2, sat in day room x2, old CT drsg dry and intact, heparin gtt cont at 1750units/hour, check 10a level this am, appeared to rest/sleep at intervals

## 2017-04-26 NOTE — PLAN OF CARE
Problem: Individualization  Goal: Patient Preferences  Outcome: No Change  Vitals:     04/26/17 0103 04/26/17 0400 04/26/17 0700 04/26/17 1211   BP: 132/71 134/75 116/71 117/73   BP Location: Left arm   Left arm     Cuff Size:           Pulse:           Resp: 18 18 18 18   Temp: 96  F (35.6  C) 96.7  F (35.9  C) 97.7  F (36.5  C) 98.1  F (36.7  C)   TempSrc: Oral Oral Oral Oral   SpO2: 93% 95% 93% 94%   Weight:           Height:           AVSS. Pain well controled with scheduled oxycodone given x 2 this shift. No c/o nausea. OOB independently. Old CT site leaking. Dressing changed x1. Tolerated tube feeding. Voiding in good amounts. Heparin drip continues at 1750-units per hr. Last 10a=0.2 this am. No change. Next check tomorrow am. Currently resting in bed. Continue with current cares and update team with any changes.

## 2017-04-26 NOTE — PROGRESS NOTES
THORACIC & FOREGUT SURGERY    S:  No acute overnight events. R CT removed yesterday, postpull CXR with small PTX which is stable this AM. Pt seen at bedside resting comfortably.  Stable subQ air.    O:  Vitals:    04/26/17 0103 04/26/17 0400 04/26/17 0700 04/26/17 1211   BP: 132/71 134/75 116/71 117/73   BP Location: Left arm  Left arm    Cuff Size:       Pulse:       Resp: 18 18 18 18   Temp: 96  F (35.6  C) 96.7  F (35.9  C) 97.7  F (36.5  C) 98.1  F (36.7  C)   TempSrc: Oral Oral Oral Oral   SpO2: 93% 95% 93% 94%   Weight:       Height:           A&Ox3, NAD  Breathing non-labored  Soft, NDNT  Distal extremities warm         A/P: Jim Braden is a 56 year old male with history of LE DVT on lovenox, neoadjuvant chemo, s/p thoracoscopic/laparoscopic esophagectomy with cervical anastomosis, J tube, pharyngostomy tube placement 4/14 for lower esophageal adenocarcinoma.  CT chest/abd to assess for source of subQ air yesterday inconclusive for source.  EGD 4/24 with no evidence of leak.  Clinically doing well.     -Post-CT removal with small R PTX, stable. Will continue to monitor  -Will discuss with SLP on advancing to CLD after video swallow/esophagram yesterday.  -Heparin gtt, may consider switch to therapeutic Lovenox  -Continue daily WBC for slow rise, now 12 today    Discussed with fellow, Dr. Luna.    Corby Palumbo MD  PGY-1 General Surgery  [facility]

## 2017-04-26 NOTE — PROGRESS NOTES
Care Coordinator- Discharge Planning     Admission Date/Time:  4/14/2017  Attending MD:  Jeronimo Mendez*     Data  Date of initial CC assessment: 4/17/2017  Chart reviewed, discussed with interdisciplinary team.   Patient was admitted for:   1. History of esophagectomy         Assessment  Full assessment completed in previous note    Coordination of Care and Referrals: Provided patient/family with options for Home Care.    [facility] and patient does have insurance coverage for the tube feeding supplies but NOT for the formula.  Ghazala faxed over order forms for the supplies and this will need to be signed by Robby Rosa, thoracic PA.  Talked with the patient and his wife Abril and they are ok with this plan.  Patients wife was going to order 4 cases of isosource 1.5, tube feed formula, through [employer] as this was the cheapest route.  Got a call from the patients wife that she tried to order off of [employer] and it was going to take 2 weeks for delivery so was wondering if there was a different way to get the formula.  Contacted [facility]eral liaison, [facility], and they would be able to provide the patient with 4 cases of tube feed formula for approximately $28/case and they would be delivered to the patients home the day of discharge free of charge.  Abril was ok with this plan.  Sabra will place an order for the patient.  CC will need to let Sabra know day of discharge and they will have the cases delivered that day.  Patient and wife given information about the [employer] where they can access tube feed formula donations for free with paying for the cost of shipping only.  Patients wife Abril plans on looking into this.           Met with patient at bedside to discuss discharge planning.  Discussed plan for tube feedings for home.  Discussed home care for skilled nursing visits.  Patient was open to this.  After giving patient choice of agency he choose  [facility](P: [phone], F: [phone]).  Referral made and orders placed.  CC will continue to follow and assist with discharge planning as needed.            Plan  Anticipated Discharge Date: 1-2 days  Anticipated Discharge Plan: Discharge to home with self pay formula through [facility], supplies for enteral nutrition from [facility], and [facility] for skilled nursing visits      Surekha Arriaga RNCC  [phone]

## 2017-04-27 ENCOUNTER — APPOINTMENT (OUTPATIENT)
Dept: CT IMAGING | Facility: CLINIC | Age: 57
DRG: 357 | End: 2017-04-27
Attending: PHYSICIAN ASSISTANT
Payer: COMMERCIAL

## 2017-04-27 ENCOUNTER — APPOINTMENT (OUTPATIENT)
Dept: SPEECH THERAPY | Facility: CLINIC | Age: 57
DRG: 357 | End: 2017-04-27
Attending: THORACIC SURGERY (CARDIOTHORACIC VASCULAR SURGERY)
Payer: COMMERCIAL

## 2017-04-27 LAB
ANION GAP SERPL CALCULATED.3IONS-SCNC: 8 MMOL/L (ref 3–14)
BACTERIA SPEC CULT: ABNORMAL
BUN SERPL-MCNC: 28 MG/DL (ref 7–30)
CALCIUM SERPL-MCNC: 8.8 MG/DL (ref 8.5–10.1)
CHLORIDE SERPL-SCNC: 105 MMOL/L (ref 94–109)
CO2 SERPL-SCNC: 26 MMOL/L (ref 20–32)
CREAT SERPL-MCNC: 0.59 MG/DL (ref 0.66–1.25)
ERYTHROCYTE [DISTWIDTH] IN BLOOD BY AUTOMATED COUNT: 14.6 % (ref 10–15)
GFR SERPL CREATININE-BSD FRML MDRD: ABNORMAL ML/MIN/1.7M2
GLUCOSE BLDC GLUCOMTR-MCNC: 103 MG/DL (ref 70–99)
GLUCOSE BLDC GLUCOMTR-MCNC: 105 MG/DL (ref 70–99)
GLUCOSE BLDC GLUCOMTR-MCNC: 130 MG/DL (ref 70–99)
GLUCOSE BLDC GLUCOMTR-MCNC: 131 MG/DL (ref 70–99)
GLUCOSE BLDC GLUCOMTR-MCNC: 158 MG/DL (ref 70–99)
GLUCOSE BLDC GLUCOMTR-MCNC: 159 MG/DL (ref 70–99)
GLUCOSE SERPL-MCNC: 139 MG/DL (ref 70–99)
GRAM STN SPEC: ABNORMAL
HCT VFR BLD AUTO: 31.7 % (ref 40–53)
HGB BLD-MCNC: 10 G/DL (ref 13.3–17.7)
LMWH PPP CHRO-ACNC: 0.16 IU/ML
Lab: ABNORMAL
MCH RBC QN AUTO: 31.1 PG (ref 26.5–33)
MCHC RBC AUTO-ENTMCNC: 31.5 G/DL (ref 31.5–36.5)
MCV RBC AUTO: 98 FL (ref 78–100)
MICRO REPORT STATUS: ABNORMAL
MICRO REPORT STATUS: ABNORMAL
PLATELET # BLD AUTO: 512 10E9/L (ref 150–450)
POTASSIUM SERPL-SCNC: 4.4 MMOL/L (ref 3.4–5.3)
RADIOLOGIST FLAGS: ABNORMAL
RBC # BLD AUTO: 3.22 10E12/L (ref 4.4–5.9)
SODIUM SERPL-SCNC: 140 MMOL/L (ref 133–144)
SPECIMEN SOURCE: ABNORMAL
SPECIMEN SOURCE: ABNORMAL
WBC # BLD AUTO: 11.9 10E9/L (ref 4–11)

## 2017-04-27 PROCEDURE — 25000132 ZZH RX MED GY IP 250 OP 250 PS 637: Performed by: THORACIC SURGERY (CARDIOTHORACIC VASCULAR SURGERY)

## 2017-04-27 PROCEDURE — 80048 BASIC METABOLIC PNL TOTAL CA: CPT | Performed by: SURGERY

## 2017-04-27 PROCEDURE — 87181 SC STD AGAR DILUTION PER AGT: CPT | Performed by: STUDENT IN AN ORGANIZED HEALTH CARE EDUCATION/TRAINING PROGRAM

## 2017-04-27 PROCEDURE — 87205 SMEAR GRAM STAIN: CPT | Performed by: STUDENT IN AN ORGANIZED HEALTH CARE EDUCATION/TRAINING PROGRAM

## 2017-04-27 PROCEDURE — 71260 CT THORAX DX C+: CPT

## 2017-04-27 PROCEDURE — 25000132 ZZH RX MED GY IP 250 OP 250 PS 637: Performed by: PHYSICIAN ASSISTANT

## 2017-04-27 PROCEDURE — 27210429 ZZH NUTRITION PRODUCT INTERMEDIATE LITER

## 2017-04-27 PROCEDURE — 25500064 ZZH RX 255 OP 636: Performed by: STUDENT IN AN ORGANIZED HEALTH CARE EDUCATION/TRAINING PROGRAM

## 2017-04-27 PROCEDURE — 40000225 ZZH STATISTIC SLP WARD VISIT: Performed by: SPEECH-LANGUAGE PATHOLOGIST

## 2017-04-27 PROCEDURE — 25000132 ZZH RX MED GY IP 250 OP 250 PS 637: Performed by: STUDENT IN AN ORGANIZED HEALTH CARE EDUCATION/TRAINING PROGRAM

## 2017-04-27 PROCEDURE — 36415 COLL VENOUS BLD VENIPUNCTURE: CPT | Performed by: SURGERY

## 2017-04-27 PROCEDURE — 12000008 ZZH R&B INTERMEDIATE UMMC

## 2017-04-27 PROCEDURE — 87070 CULTURE OTHR SPECIMN AEROBIC: CPT | Performed by: STUDENT IN AN ORGANIZED HEALTH CARE EDUCATION/TRAINING PROGRAM

## 2017-04-27 PROCEDURE — 25000128 H RX IP 250 OP 636: Performed by: STUDENT IN AN ORGANIZED HEALTH CARE EDUCATION/TRAINING PROGRAM

## 2017-04-27 PROCEDURE — 85520 HEPARIN ASSAY: CPT | Performed by: SURGERY

## 2017-04-27 PROCEDURE — 92526 ORAL FUNCTION THERAPY: CPT | Mod: GN | Performed by: SPEECH-LANGUAGE PATHOLOGIST

## 2017-04-27 PROCEDURE — 85027 COMPLETE CBC AUTOMATED: CPT | Performed by: SURGERY

## 2017-04-27 PROCEDURE — 70491 CT SOFT TISSUE NECK W/DYE: CPT

## 2017-04-27 PROCEDURE — 25000132 ZZH RX MED GY IP 250 OP 250 PS 637: Performed by: SURGERY

## 2017-04-27 PROCEDURE — 87077 CULTURE AEROBIC IDENTIFY: CPT | Performed by: STUDENT IN AN ORGANIZED HEALTH CARE EDUCATION/TRAINING PROGRAM

## 2017-04-27 PROCEDURE — 00000146 ZZHCL STATISTIC GLUCOSE BY METER IP

## 2017-04-27 RX ORDER — IOPAMIDOL 755 MG/ML
100 INJECTION, SOLUTION INTRAVASCULAR ONCE
Status: COMPLETED | OUTPATIENT
Start: 2017-04-27 | End: 2017-04-27

## 2017-04-27 RX ORDER — HYDROMORPHONE HYDROCHLORIDE 1 MG/ML
0.5 INJECTION, SOLUTION INTRAMUSCULAR; INTRAVENOUS; SUBCUTANEOUS ONCE
Status: COMPLETED | OUTPATIENT
Start: 2017-04-27 | End: 2017-04-27

## 2017-04-27 RX ORDER — PIPERACILLIN SODIUM, TAZOBACTAM SODIUM 3; .375 G/15ML; G/15ML
3.38 INJECTION, POWDER, LYOPHILIZED, FOR SOLUTION INTRAVENOUS EVERY 6 HOURS
Status: DISCONTINUED | OUTPATIENT
Start: 2017-04-27 | End: 2017-05-01

## 2017-04-27 RX ORDER — FLUCONAZOLE 2 MG/ML
200 INJECTION, SOLUTION INTRAVENOUS EVERY 24 HOURS
Status: DISCONTINUED | OUTPATIENT
Start: 2017-04-27 | End: 2017-05-01

## 2017-04-27 RX ADMIN — ACETAMINOPHEN 650 MG: 325 SOLUTION ORAL at 12:47

## 2017-04-27 RX ADMIN — METOPROLOL TARTRATE 12.5 MG: 100 TABLET ORAL at 09:05

## 2017-04-27 RX ADMIN — CHLORHEXIDINE GLUCONATE 15 ML: 1.2 RINSE ORAL at 21:02

## 2017-04-27 RX ADMIN — ANTISEPTIC SURGICAL SCRUB: 0.04 SOLUTION TOPICAL at 09:05

## 2017-04-27 RX ADMIN — MULTIVITAMIN 15 ML: LIQUID ORAL at 09:04

## 2017-04-27 RX ADMIN — OXYCODONE HYDROCHLORIDE 5 MG: 5 SOLUTION ORAL at 12:47

## 2017-04-27 RX ADMIN — OXYCODONE HYDROCHLORIDE 5 MG: 5 SOLUTION ORAL at 09:04

## 2017-04-27 RX ADMIN — METOPROLOL TARTRATE 12.5 MG: 100 TABLET ORAL at 21:03

## 2017-04-27 RX ADMIN — PANTOPRAZOLE SODIUM 40 MG: 40 TABLET, DELAYED RELEASE ORAL at 21:02

## 2017-04-27 RX ADMIN — PANTOPRAZOLE SODIUM 40 MG: 40 TABLET, DELAYED RELEASE ORAL at 09:05

## 2017-04-27 RX ADMIN — PIPERACILLIN SODIUM,TAZOBACTAM SODIUM 3.38 G: 3; .375 INJECTION, POWDER, FOR SOLUTION INTRAVENOUS at 15:33

## 2017-04-27 RX ADMIN — PIPERACILLIN SODIUM,TAZOBACTAM SODIUM 3.38 G: 3; .375 INJECTION, POWDER, FOR SOLUTION INTRAVENOUS at 21:15

## 2017-04-27 RX ADMIN — PIPERACILLIN SODIUM,TAZOBACTAM SODIUM 3.38 G: 3; .375 INJECTION, POWDER, FOR SOLUTION INTRAVENOUS at 08:57

## 2017-04-27 RX ADMIN — INSULIN ASPART 1 UNITS: 100 INJECTION, SOLUTION INTRAVENOUS; SUBCUTANEOUS at 04:05

## 2017-04-27 RX ADMIN — HYDROMORPHONE HYDROCHLORIDE 0.5 MG: 1 INJECTION, SOLUTION INTRAMUSCULAR; INTRAVENOUS; SUBCUTANEOUS at 08:47

## 2017-04-27 RX ADMIN — FLUCONAZOLE 200 MG: 2 INJECTION INTRAVENOUS at 12:56

## 2017-04-27 RX ADMIN — Medication 1 PACKET: at 10:01

## 2017-04-27 RX ADMIN — SENNOSIDES A AND B 10 ML: 415.36 LIQUID ORAL at 09:04

## 2017-04-27 RX ADMIN — OXYCODONE HYDROCHLORIDE 5 MG: 5 SOLUTION ORAL at 15:43

## 2017-04-27 RX ADMIN — SENNOSIDES A AND B 5 ML: 415.36 LIQUID ORAL at 21:01

## 2017-04-27 RX ADMIN — OXYCODONE HYDROCHLORIDE 5 MG: 5 SOLUTION ORAL at 21:01

## 2017-04-27 RX ADMIN — INSULIN ASPART 1 UNITS: 100 INJECTION, SOLUTION INTRAVENOUS; SUBCUTANEOUS at 09:03

## 2017-04-27 RX ADMIN — OXYCODONE HYDROCHLORIDE 5 MG: 5 SOLUTION ORAL at 04:05

## 2017-04-27 RX ADMIN — IOPAMIDOL 100 ML: 755 INJECTION, SOLUTION INTRAVENOUS at 10:24

## 2017-04-27 ASSESSMENT — PAIN DESCRIPTION - DESCRIPTORS: DESCRIPTORS: ACHING

## 2017-04-27 NOTE — PLAN OF CARE
Problem: Goal Outcome Summary  Goal: Goal Outcome Summary  Outcome: Improving  Afebrile, p 102, 110/73. Triggered lactic, it was 8 hours.  Tf at 90/hr, increase again at midnight.  jt site cleansed.  Old ct site draining serous, drsg changed.  Receiving tylenol q4 and prn tylenol down jt. Tolerated clears tonight.  Voiding, not saving, had a bm tonight.  Walking halls.

## 2017-04-27 NOTE — PLAN OF CARE
Problem: Goal Outcome Summary  Goal: Goal Outcome Summary  Outcome: No Change  Vitals:     04/26/17 2013 04/27/17 0000 04/27/17 0400 04/27/17 0700   BP: 110/73 127/71 125/67 141/78   BP Location: Right arm     Right arm   Cuff Size:           Pulse:           Resp: 16 16 16 16   Temp: 98.6  F (37  C) 98.2  F (36.8  C) 98.6  F (37  C) 100.1  F (37.8  C)   TempSrc: Oral Oral   Oral   SpO2: 97% 95% 95% 94%   Weight:           Height:             Pt's pain controlled with scheduled oxycodone and prn tylenol x1. Neck incision CDI, changed by MDs. Pus noted around neck incision. Neck and chest CT done this AM. IV zosyn and diflucan started.  Abdominal incisions CDI. NPO, cycled tube feedings stopped at 0800. BGs 159 and 105. Heparin infusing at 1750 cc/hr. Voiding adequate amounts, BM. Up independently. Continue with plan of care.

## 2017-04-27 NOTE — PROGRESS NOTES
THORACIC & FOREGUT SURGERY    S:  No acute overnight events. Pt seen at bedside resting comfortably. Purulence noted draining from neck wound upon eval this morning.    O:  Vitals:    04/26/17 2013 04/27/17 0000 04/27/17 0400 04/27/17 0700   BP: 110/73 127/71 125/67 141/78   BP Location: Right arm   Right arm   Cuff Size:       Pulse:       Resp: 16 16 16 16   Temp: 98.6  F (37  C) 98.2  F (36.8  C) 98.6  F (37  C) 100.1  F (37.8  C)   TempSrc: Oral Oral  Oral   SpO2: 97% 95% 95% 94%   Weight:       Height:           Gen: A&Ox3, NAD  HEENT: neck wound with tanisha purulent drainage.   Pulm: Breathing non-labored  Abd: Soft, NDNT  Ext: WWP        A/P: Jim Braden is a 56 year old male with history of LE DVT on lovenox, neoadjuvant chemo, s/p thoracoscopic/laparoscopic esophagectomy with cervical anastomosis, J tube, pharyngostomy tube placement 4/14 for lower esophageal adenocarcinoma.  CT chest/abd to assess for source of subQ air yesterday inconclusive for source.  EGD 4/24 with no evidence of leak.  Now with purulence from neck wound, explored at bedside.     - Starting antibiotics: Zosyn, Fluconazole  - F/U wound cultures taken at bedside today  - F/U CT neck/chest  - Heparin gtt, may consider switch to therapeutic Lovenox  - Continue daily WBC for slow rise, now 11 today    Discussed with fellow, Dr. Luna.    Corby Palumbo MD  PGY-1 General Surgery  [facility]

## 2017-04-27 NOTE — PLAN OF CARE
Problem: Goal Outcome Summary  Goal: Goal Outcome Summary  SLP: Pt seen bedside for dysphagia f/u.  PO trials ok'ed per Thoracic MD team.  Pt demonstrated tolerance of clear liquids with use of chin tuck strategy.  Recommend advance diet to clear liquids with use of chin tuck as medically appropriate per Thoracic MD team.  Encourage swallow exercises as able.  ST to follow.

## 2017-04-27 NOTE — PLAN OF CARE
Problem: Individualization  Goal: Patient Preferences  Afeb, vitals stable, 02 sats 95% on room air, states pain is manageable, scheduled oxycodone with relief, tube feeds increased thru the night to goal of 110cc/hour, glucoses 130 and 158, covered per sliding scale, incisions dry, intact and dermabonded, old CT sites intact, voiding not saving, up walking in halls x2, offers no c/o, appeared to rest/sleep at intervals,

## 2017-04-28 ENCOUNTER — ANESTHESIA EVENT (OUTPATIENT)
Dept: SURGERY | Facility: CLINIC | Age: 57
DRG: 357 | End: 2017-04-28
Payer: COMMERCIAL

## 2017-04-28 ENCOUNTER — ANESTHESIA (OUTPATIENT)
Dept: SURGERY | Facility: CLINIC | Age: 57
DRG: 357 | End: 2017-04-28
Payer: COMMERCIAL

## 2017-04-28 LAB
ABO + RH BLD: NORMAL
ABO + RH BLD: NORMAL
ANION GAP SERPL CALCULATED.3IONS-SCNC: 9 MMOL/L (ref 3–14)
BLD GP AB SCN SERPL QL: NORMAL
BLOOD BANK CMNT PATIENT-IMP: NORMAL
BUN SERPL-MCNC: 21 MG/DL (ref 7–30)
CALCIUM SERPL-MCNC: 8.3 MG/DL (ref 8.5–10.1)
CHLORIDE SERPL-SCNC: 105 MMOL/L (ref 94–109)
CO2 SERPL-SCNC: 24 MMOL/L (ref 20–32)
CREAT SERPL-MCNC: 0.65 MG/DL (ref 0.66–1.25)
ERYTHROCYTE [DISTWIDTH] IN BLOOD BY AUTOMATED COUNT: 14.4 % (ref 10–15)
GFR SERPL CREATININE-BSD FRML MDRD: ABNORMAL ML/MIN/1.7M2
GLUCOSE BLDC GLUCOMTR-MCNC: 120 MG/DL (ref 70–99)
GLUCOSE BLDC GLUCOMTR-MCNC: 126 MG/DL (ref 70–99)
GLUCOSE BLDC GLUCOMTR-MCNC: 147 MG/DL (ref 70–99)
GLUCOSE BLDC GLUCOMTR-MCNC: 90 MG/DL (ref 70–99)
GLUCOSE BLDC GLUCOMTR-MCNC: 97 MG/DL (ref 70–99)
GLUCOSE SERPL-MCNC: 104 MG/DL (ref 70–99)
HCT VFR BLD AUTO: 30.6 % (ref 40–53)
HGB BLD-MCNC: 9.8 G/DL (ref 13.3–17.7)
INR PPP: 1.04 (ref 0.86–1.14)
LMWH PPP CHRO-ACNC: NORMAL IU/ML
MCH RBC QN AUTO: 31.3 PG (ref 26.5–33)
MCHC RBC AUTO-ENTMCNC: 32 G/DL (ref 31.5–36.5)
MCV RBC AUTO: 98 FL (ref 78–100)
PLATELET # BLD AUTO: 503 10E9/L (ref 150–450)
POTASSIUM SERPL-SCNC: 4.6 MMOL/L (ref 3.4–5.3)
RBC # BLD AUTO: 3.13 10E12/L (ref 4.4–5.9)
SODIUM SERPL-SCNC: 139 MMOL/L (ref 133–144)
SPECIMEN EXP DATE BLD: NORMAL
WBC # BLD AUTO: 7.3 10E9/L (ref 4–11)

## 2017-04-28 PROCEDURE — 36415 COLL VENOUS BLD VENIPUNCTURE: CPT | Performed by: STUDENT IN AN ORGANIZED HEALTH CARE EDUCATION/TRAINING PROGRAM

## 2017-04-28 PROCEDURE — 86900 BLOOD TYPING SEROLOGIC ABO: CPT | Performed by: ANESTHESIOLOGY

## 2017-04-28 PROCEDURE — 25000132 ZZH RX MED GY IP 250 OP 250 PS 637: Performed by: PHYSICIAN ASSISTANT

## 2017-04-28 PROCEDURE — 25000128 H RX IP 250 OP 636

## 2017-04-28 PROCEDURE — 85027 COMPLETE CBC AUTOMATED: CPT | Performed by: SURGERY

## 2017-04-28 PROCEDURE — 27210429 ZZH NUTRITION PRODUCT INTERMEDIATE LITER

## 2017-04-28 PROCEDURE — 86850 RBC ANTIBODY SCREEN: CPT | Performed by: ANESTHESIOLOGY

## 2017-04-28 PROCEDURE — 71000014 ZZH RECOVERY PHASE 1 LEVEL 2 FIRST HR: Performed by: STUDENT IN AN ORGANIZED HEALTH CARE EDUCATION/TRAINING PROGRAM

## 2017-04-28 PROCEDURE — 36000064 ZZH SURGERY LEVEL 4 EA 15 ADDTL MIN - UMMC: Performed by: STUDENT IN AN ORGANIZED HEALTH CARE EDUCATION/TRAINING PROGRAM

## 2017-04-28 PROCEDURE — 86901 BLOOD TYPING SEROLOGIC RH(D): CPT | Performed by: ANESTHESIOLOGY

## 2017-04-28 PROCEDURE — 25000128 H RX IP 250 OP 636: Performed by: STUDENT IN AN ORGANIZED HEALTH CARE EDUCATION/TRAINING PROGRAM

## 2017-04-28 PROCEDURE — 85520 HEPARIN ASSAY: CPT | Performed by: SURGERY

## 2017-04-28 PROCEDURE — 27210794 ZZH OR GENERAL SUPPLY STERILE: Performed by: STUDENT IN AN ORGANIZED HEALTH CARE EDUCATION/TRAINING PROGRAM

## 2017-04-28 PROCEDURE — 40000170 ZZH STATISTIC PRE-PROCEDURE ASSESSMENT II: Performed by: STUDENT IN AN ORGANIZED HEALTH CARE EDUCATION/TRAINING PROGRAM

## 2017-04-28 PROCEDURE — 25000125 ZZHC RX 250

## 2017-04-28 PROCEDURE — 0DJ08ZZ INSPECTION OF UPPER INTESTINAL TRACT, VIA NATURAL OR ARTIFICIAL OPENING ENDOSCOPIC: ICD-10-PCS | Performed by: STUDENT IN AN ORGANIZED HEALTH CARE EDUCATION/TRAINING PROGRAM

## 2017-04-28 PROCEDURE — 25800025 ZZH RX 258: Performed by: STUDENT IN AN ORGANIZED HEALTH CARE EDUCATION/TRAINING PROGRAM

## 2017-04-28 PROCEDURE — 00000146 ZZHCL STATISTIC GLUCOSE BY METER IP

## 2017-04-28 PROCEDURE — 25000566 ZZH SEVOFLURANE, EA 15 MIN: Performed by: STUDENT IN AN ORGANIZED HEALTH CARE EDUCATION/TRAINING PROGRAM

## 2017-04-28 PROCEDURE — 85610 PROTHROMBIN TIME: CPT | Performed by: STUDENT IN AN ORGANIZED HEALTH CARE EDUCATION/TRAINING PROGRAM

## 2017-04-28 PROCEDURE — 37000008 ZZH ANESTHESIA TECHNICAL FEE, 1ST 30 MIN: Performed by: STUDENT IN AN ORGANIZED HEALTH CARE EDUCATION/TRAINING PROGRAM

## 2017-04-28 PROCEDURE — 12000003 ZZH R&B CRITICAL UMMC

## 2017-04-28 PROCEDURE — 37000009 ZZH ANESTHESIA TECHNICAL FEE, EACH ADDTL 15 MIN: Performed by: STUDENT IN AN ORGANIZED HEALTH CARE EDUCATION/TRAINING PROGRAM

## 2017-04-28 PROCEDURE — 3E1038Z IRRIGATION OF SKIN AND MUCOUS MEMBRANES USING IRRIGATING SUBSTANCE, PERCUTANEOUS APPROACH: ICD-10-PCS | Performed by: STUDENT IN AN ORGANIZED HEALTH CARE EDUCATION/TRAINING PROGRAM

## 2017-04-28 PROCEDURE — 25000132 ZZH RX MED GY IP 250 OP 250 PS 637: Performed by: STUDENT IN AN ORGANIZED HEALTH CARE EDUCATION/TRAINING PROGRAM

## 2017-04-28 PROCEDURE — 36000062 ZZH SURGERY LEVEL 4 1ST 30 MIN - UMMC: Performed by: STUDENT IN AN ORGANIZED HEALTH CARE EDUCATION/TRAINING PROGRAM

## 2017-04-28 PROCEDURE — 27210995 ZZH RX 272

## 2017-04-28 PROCEDURE — 36415 COLL VENOUS BLD VENIPUNCTURE: CPT | Performed by: SURGERY

## 2017-04-28 PROCEDURE — 80048 BASIC METABOLIC PNL TOTAL CA: CPT | Performed by: SURGERY

## 2017-04-28 PROCEDURE — 25000132 ZZH RX MED GY IP 250 OP 250 PS 637: Performed by: THORACIC SURGERY (CARDIOTHORACIC VASCULAR SURGERY)

## 2017-04-28 PROCEDURE — 25000128 H RX IP 250 OP 636: Performed by: SURGERY

## 2017-04-28 PROCEDURE — 40000556 ZZH STATISTIC PERIPHERAL IV START W US GUIDANCE

## 2017-04-28 RX ORDER — FENTANYL CITRATE 50 UG/ML
25-50 INJECTION, SOLUTION INTRAMUSCULAR; INTRAVENOUS EVERY 5 MIN PRN
Status: DISCONTINUED | OUTPATIENT
Start: 2017-04-28 | End: 2017-04-28 | Stop reason: HOSPADM

## 2017-04-28 RX ORDER — ACETAMINOPHEN 325 MG/1
975 TABLET ORAL ONCE
Status: DISCONTINUED | OUTPATIENT
Start: 2017-04-28 | End: 2017-04-28 | Stop reason: ALTCHOICE

## 2017-04-28 RX ORDER — NALOXONE HYDROCHLORIDE 0.4 MG/ML
.1-.4 INJECTION, SOLUTION INTRAMUSCULAR; INTRAVENOUS; SUBCUTANEOUS
Status: DISCONTINUED | OUTPATIENT
Start: 2017-04-28 | End: 2017-04-28 | Stop reason: HOSPADM

## 2017-04-28 RX ORDER — LIDOCAINE HYDROCHLORIDE 20 MG/ML
INJECTION, SOLUTION INFILTRATION; PERINEURAL PRN
Status: DISCONTINUED | OUTPATIENT
Start: 2017-04-28 | End: 2017-04-28

## 2017-04-28 RX ORDER — FENTANYL CITRATE 50 UG/ML
INJECTION, SOLUTION INTRAMUSCULAR; INTRAVENOUS PRN
Status: DISCONTINUED | OUTPATIENT
Start: 2017-04-28 | End: 2017-04-28

## 2017-04-28 RX ORDER — SODIUM CHLORIDE, SODIUM LACTATE, POTASSIUM CHLORIDE, CALCIUM CHLORIDE 600; 310; 30; 20 MG/100ML; MG/100ML; MG/100ML; MG/100ML
INJECTION, SOLUTION INTRAVENOUS CONTINUOUS
Status: DISCONTINUED | OUTPATIENT
Start: 2017-04-28 | End: 2017-04-28 | Stop reason: HOSPADM

## 2017-04-28 RX ORDER — MEPERIDINE HYDROCHLORIDE 25 MG/ML
12.5 INJECTION INTRAMUSCULAR; INTRAVENOUS; SUBCUTANEOUS
Status: DISCONTINUED | OUTPATIENT
Start: 2017-04-28 | End: 2017-04-28 | Stop reason: HOSPADM

## 2017-04-28 RX ORDER — ONDANSETRON 2 MG/ML
INJECTION INTRAMUSCULAR; INTRAVENOUS PRN
Status: DISCONTINUED | OUTPATIENT
Start: 2017-04-28 | End: 2017-04-28

## 2017-04-28 RX ORDER — ONDANSETRON 2 MG/ML
4 INJECTION INTRAMUSCULAR; INTRAVENOUS EVERY 30 MIN PRN
Status: DISCONTINUED | OUTPATIENT
Start: 2017-04-28 | End: 2017-04-28 | Stop reason: HOSPADM

## 2017-04-28 RX ORDER — SODIUM CHLORIDE, SODIUM LACTATE, POTASSIUM CHLORIDE, CALCIUM CHLORIDE 600; 310; 30; 20 MG/100ML; MG/100ML; MG/100ML; MG/100ML
INJECTION, SOLUTION INTRAVENOUS CONTINUOUS
Status: DISCONTINUED | OUTPATIENT
Start: 2017-04-28 | End: 2017-05-01 | Stop reason: HOSPADM

## 2017-04-28 RX ORDER — KETAMINE HYDROCHLORIDE 10 MG/ML
INJECTION, SOLUTION INTRAMUSCULAR; INTRAVENOUS PRN
Status: DISCONTINUED | OUTPATIENT
Start: 2017-04-28 | End: 2017-04-28

## 2017-04-28 RX ORDER — PROPOFOL 10 MG/ML
INJECTION, EMULSION INTRAVENOUS PRN
Status: DISCONTINUED | OUTPATIENT
Start: 2017-04-28 | End: 2017-04-28

## 2017-04-28 RX ORDER — ONDANSETRON 4 MG/1
4 TABLET, ORALLY DISINTEGRATING ORAL EVERY 30 MIN PRN
Status: DISCONTINUED | OUTPATIENT
Start: 2017-04-28 | End: 2017-04-28 | Stop reason: HOSPADM

## 2017-04-28 RX ORDER — HYDROMORPHONE HYDROCHLORIDE 1 MG/ML
.3-.5 INJECTION, SOLUTION INTRAMUSCULAR; INTRAVENOUS; SUBCUTANEOUS EVERY 10 MIN PRN
Status: DISCONTINUED | OUTPATIENT
Start: 2017-04-28 | End: 2017-04-28 | Stop reason: HOSPADM

## 2017-04-28 RX ADMIN — FENTANYL CITRATE 50 MCG: 50 INJECTION, SOLUTION INTRAMUSCULAR; INTRAVENOUS at 18:30

## 2017-04-28 RX ADMIN — ACETAMINOPHEN 650 MG: 325 SOLUTION ORAL at 21:12

## 2017-04-28 RX ADMIN — FENTANYL CITRATE 25 MCG: 50 INJECTION, SOLUTION INTRAMUSCULAR; INTRAVENOUS at 19:18

## 2017-04-28 RX ADMIN — OXYCODONE HYDROCHLORIDE 5 MG: 5 SOLUTION ORAL at 21:13

## 2017-04-28 RX ADMIN — ONDANSETRON 4 MG: 2 INJECTION INTRAMUSCULAR; INTRAVENOUS at 19:13

## 2017-04-28 RX ADMIN — LIDOCAINE HYDROCHLORIDE 100 MG: 20 INJECTION, SOLUTION INFILTRATION; PERINEURAL at 18:38

## 2017-04-28 RX ADMIN — PROPOFOL 20 MG: 10 INJECTION, EMULSION INTRAVENOUS at 19:09

## 2017-04-28 RX ADMIN — PROPOFOL 120 MG: 10 INJECTION, EMULSION INTRAVENOUS at 18:38

## 2017-04-28 RX ADMIN — PIPERACILLIN SODIUM,TAZOBACTAM SODIUM 3.38 G: 3; .375 INJECTION, POWDER, FOR SOLUTION INTRAVENOUS at 02:32

## 2017-04-28 RX ADMIN — SODIUM CHLORIDE, POTASSIUM CHLORIDE, SODIUM LACTATE AND CALCIUM CHLORIDE: 600; 310; 30; 20 INJECTION, SOLUTION INTRAVENOUS at 17:42

## 2017-04-28 RX ADMIN — MIDAZOLAM HYDROCHLORIDE 1 MG: 1 INJECTION, SOLUTION INTRAMUSCULAR; INTRAVENOUS at 18:19

## 2017-04-28 RX ADMIN — FLUCONAZOLE 200 MG: 2 INJECTION INTRAVENOUS at 12:26

## 2017-04-28 RX ADMIN — ANTISEPTIC SURGICAL SCRUB: 0.04 SOLUTION TOPICAL at 08:18

## 2017-04-28 RX ADMIN — HEPARIN SODIUM 2050 UNITS/HR: 10000 INJECTION, SOLUTION INTRAVENOUS at 20:57

## 2017-04-28 RX ADMIN — OXYCODONE HYDROCHLORIDE 5 MG: 5 SOLUTION ORAL at 08:05

## 2017-04-28 RX ADMIN — PHENYLEPHRINE HYDROCHLORIDE 100 MCG: 10 INJECTION, SOLUTION INTRAMUSCULAR; INTRAVENOUS; SUBCUTANEOUS at 18:53

## 2017-04-28 RX ADMIN — ACETAMINOPHEN 975 MG: 325 SOLUTION ORAL at 15:11

## 2017-04-28 RX ADMIN — PROPOFOL 20 MG: 10 INJECTION, EMULSION INTRAVENOUS at 19:07

## 2017-04-28 RX ADMIN — MULTIVITAMIN 15 ML: LIQUID ORAL at 08:05

## 2017-04-28 RX ADMIN — SODIUM CHLORIDE, POTASSIUM CHLORIDE, SODIUM LACTATE AND CALCIUM CHLORIDE: 600; 310; 30; 20 INJECTION, SOLUTION INTRAVENOUS at 14:18

## 2017-04-28 RX ADMIN — OXYCODONE HYDROCHLORIDE 5 MG: 5 SOLUTION ORAL at 12:27

## 2017-04-28 RX ADMIN — PIPERACILLIN SODIUM,TAZOBACTAM SODIUM 3.38 G: 3; .375 INJECTION, POWDER, FOR SOLUTION INTRAVENOUS at 14:18

## 2017-04-28 RX ADMIN — PANTOPRAZOLE SODIUM 40 MG: 40 TABLET, DELAYED RELEASE ORAL at 08:04

## 2017-04-28 RX ADMIN — OXYCODONE HYDROCHLORIDE 5 MG: 5 SOLUTION ORAL at 04:10

## 2017-04-28 RX ADMIN — OXYCODONE HYDROCHLORIDE 5 MG: 5 SOLUTION ORAL at 01:14

## 2017-04-28 RX ADMIN — SUCCINYLCHOLINE CHLORIDE 100 MG: 20 INJECTION, SOLUTION INTRAMUSCULAR; INTRAVENOUS at 18:38

## 2017-04-28 RX ADMIN — HEPARIN SODIUM 1750 UNITS/HR: 10000 INJECTION, SOLUTION INTRAVENOUS at 02:34

## 2017-04-28 RX ADMIN — KETAMINE HYDROCHLORIDE 10 MG: 10 INJECTION, SOLUTION INTRAMUSCULAR; INTRAVENOUS at 18:48

## 2017-04-28 RX ADMIN — FENTANYL CITRATE 100 MCG: 50 INJECTION, SOLUTION INTRAMUSCULAR; INTRAVENOUS at 18:45

## 2017-04-28 RX ADMIN — PIPERACILLIN SODIUM,TAZOBACTAM SODIUM 3.38 G: 3; .375 INJECTION, POWDER, FOR SOLUTION INTRAVENOUS at 08:02

## 2017-04-28 RX ADMIN — INSULIN ASPART 1 UNITS: 100 INJECTION, SOLUTION INTRAVENOUS; SUBCUTANEOUS at 04:15

## 2017-04-28 RX ADMIN — METOPROLOL TARTRATE 12.5 MG: 100 TABLET ORAL at 08:04

## 2017-04-28 RX ADMIN — Medication 1 PACKET: at 12:27

## 2017-04-28 ASSESSMENT — ENCOUNTER SYMPTOMS: ORTHOPNEA: 0

## 2017-04-28 ASSESSMENT — LIFESTYLE VARIABLES: TOBACCO_USE: 1

## 2017-04-28 NOTE — PLAN OF CARE
Problem: Goal Outcome Summary  Goal: Goal Outcome Summary  SLP: Dysphagia therapy on hold as he remains medically NPO.  From an oropharyngeal swallow standpoint, pt is appropriate for clear liquids when tucking chin and taking small, single sips.  ST to follow as indicated on POC.

## 2017-04-28 NOTE — PLAN OF CARE
Problem: Goal Outcome Summary  Goal: Goal Outcome Summary  Outcome: Improving  VSS. A&O. Up ambulating IND. Pain controlled with scheduled oxycodone. J-tube infusing feeding at 110cc/hour. Heparin drip unchanged. 1740 Dressing reinforced to neck- packing was not removed.  and 131. Voiding without difficulty. Continue with POC.

## 2017-04-28 NOTE — PROGRESS NOTES
THORACIC & FOREGUT SURGERY    S:  No acute overnight events. Pt seen at bedside resting comfortably. Neck wound dressing changed at bedside, scant purulent drainage. Afebrile. Voiding, BMs without issue.    O:  Vitals:    04/27/17 1657 04/27/17 2040 04/27/17 2207 04/28/17 0749   BP:  125/76 120/70 124/66   BP Location:  Right arm Right arm Right arm   Cuff Size:       Pulse:       Resp:  16 16 16   Temp:  97  F (36.1  C) 97.1  F (36.2  C) 97  F (36.1  C)   TempSrc:  Oral Oral Oral   SpO2:  96% 95% 96%   Weight: 78.9 kg (173 lb 14.4 oz)      Height:           Gen: A&Ox3, NAD  HEENT: L neck wound with scant purulent drainage.   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  CT chest/abd to assess for source of subQ air yesterday inconclusive for source.  EGD 4/24 with no evidence of leak.  Now with purulence from neck wound, explored at bedside.     - Continue antibiotics: Zosyn, Fluconazole  - F/U wound cultures  - Heparin gtt, may consider switch to therapeutic Lovenox if no procedures planned    Discussed with fellow, Dr. Luna.    Corby Palumbo MD  PGY-1 General Surgery  [facility]

## 2017-04-28 NOTE — PLAN OF CARE
Problem: Goal Outcome Summary  Goal: Goal Outcome Summary  Vital signs:  Temp: 97.1  F (36.2  C) Temp src: Oral BP: 120/70   Heart Rate: 80 Resp: 16 SpO2: 95 % O2 Device: None (Room air)   VSS. C/O pain in lower abdomen, oxycodone scheduled q4h through J-tube. Lap sites dermabonded, C/D/I. Dressing on R side changed D/T leakage. Pharyngostomy removal site packed and covered with primapore, some drainage on dressing, no change during shift. Heparin drip infusing at 17.5 mL/hr. Abx infused. TF's running at 110 mL/hr tolerating.  and 147, 1 unit administered via sliding scale. Up independently in room and halls. Sleeping between cares.

## 2017-04-28 NOTE — ANESTHESIA PREPROCEDURE EVALUATION
Anesthesia Evaluation     . Pt has had prior anesthetic. Type: General and MAC    No history of anesthetic complications          ROS/MED HX    ENT/Pulmonary:     (+)tobacco use, Past use Quit in 2007 packs/day  , . Other pulmonary disease Alpha 1 antitrypsin deficiency carrier.    Neurologic:  - neg neurologic ROS     Cardiovascular:     (+) hypertension-range: 140s/80s, ---. Taking blood thinners Pt has received instructions: Instructions Given to patient: Patient instructed to hold Lovenox in preparation for surgery. . . :. .      (-) JUNIOR and orthopnea/PND   METS/Exercise Tolerance:  >4 METS   Hematologic: Comments: Blood clot in right leg, diagnosed in January/February.  Thought to be related to chemotherapy.    (+) History of blood clots pt is anticoagulated, -      Musculoskeletal:  - neg musculoskeletal ROS       GI/Hepatic:     (+) GERD Asymptomatic on medication,       Renal/Genitourinary:  - ROS Renal section negative       Endo:  - neg endo ROS       Psychiatric:  - neg psychiatric ROS       Infectious Disease:  - neg infectious disease ROS       Malignancy:   (+) Malignancy History of GI  GI CA  Active status post Chemo,         Other:    - neg other ROS                 Physical Exam  Normal systems: pulmonary and dental    Airway   Mallampati: II  TM distance: >3 FB  Neck ROM: full    Dental     Cardiovascular   Rhythm and rate: regular and normal      Pulmonary                     Anesthesia Plan      History & Physical Review  History and physical reviewed and following examination; no interval change.    ASA Status:  3 .        Plan for General and ETT with Intravenous induction. Maintenance will be Balanced.    PONV prophylaxis:  Ondansetron (or other 5HT-3) and Dexamethasone or Solumedrol  Additional equipment: 2nd IV     Jim Braden is a 56 year old male with infected neck wound who needs and incision and drainage of left neck wound with Dr. Nobles. Previous surgeries for EGD in medical record.        Postoperative Care  Postoperative pain management:  IV analgesics and Multi-modal analgesia.      Consents  Anesthetic plan, risks, benefits and alternatives discussed with:  Patient.  Use of blood products discussed: Yes.   Use of blood products discussed with Patient.  Consented to blood products.  .      Anesthesia plan was discussed in detail with patient. He understood and agreed to proceed as planned all questions answered    Raymon Jackman MD

## 2017-04-29 LAB
BACTERIA SPEC CULT: NO GROWTH
BACTERIA SPEC CULT: NO GROWTH
GLUCOSE BLDC GLUCOMTR-MCNC: 100 MG/DL (ref 70–99)
GLUCOSE BLDC GLUCOMTR-MCNC: 115 MG/DL (ref 70–99)
GLUCOSE BLDC GLUCOMTR-MCNC: 121 MG/DL (ref 70–99)
GLUCOSE BLDC GLUCOMTR-MCNC: 142 MG/DL (ref 70–99)
GLUCOSE BLDC GLUCOMTR-MCNC: 164 MG/DL (ref 70–99)
GLUCOSE BLDC GLUCOMTR-MCNC: 91 MG/DL (ref 70–99)
LMWH PPP CHRO-ACNC: 0.51 IU/ML
LMWH PPP CHRO-ACNC: 0.6 IU/ML
LMWH PPP CHRO-ACNC: 0.6 IU/ML
MICRO REPORT STATUS: NORMAL
MICRO REPORT STATUS: NORMAL
PLATELET # BLD AUTO: 494 10E9/L (ref 150–450)
SPECIMEN SOURCE: NORMAL
SPECIMEN SOURCE: NORMAL

## 2017-04-29 PROCEDURE — 25000132 ZZH RX MED GY IP 250 OP 250 PS 637: Performed by: PHYSICIAN ASSISTANT

## 2017-04-29 PROCEDURE — 85520 HEPARIN ASSAY: CPT | Performed by: SURGERY

## 2017-04-29 PROCEDURE — 25000132 ZZH RX MED GY IP 250 OP 250 PS 637: Performed by: STUDENT IN AN ORGANIZED HEALTH CARE EDUCATION/TRAINING PROGRAM

## 2017-04-29 PROCEDURE — 85520 HEPARIN ASSAY: CPT | Performed by: THORACIC SURGERY (CARDIOTHORACIC VASCULAR SURGERY)

## 2017-04-29 PROCEDURE — 25000128 H RX IP 250 OP 636: Performed by: STUDENT IN AN ORGANIZED HEALTH CARE EDUCATION/TRAINING PROGRAM

## 2017-04-29 PROCEDURE — 25000132 ZZH RX MED GY IP 250 OP 250 PS 637: Performed by: THORACIC SURGERY (CARDIOTHORACIC VASCULAR SURGERY)

## 2017-04-29 PROCEDURE — 25000132 ZZH RX MED GY IP 250 OP 250 PS 637: Performed by: SURGERY

## 2017-04-29 PROCEDURE — 12000008 ZZH R&B INTERMEDIATE UMMC

## 2017-04-29 PROCEDURE — 00000146 ZZHCL STATISTIC GLUCOSE BY METER IP

## 2017-04-29 PROCEDURE — 36415 COLL VENOUS BLD VENIPUNCTURE: CPT | Performed by: THORACIC SURGERY (CARDIOTHORACIC VASCULAR SURGERY)

## 2017-04-29 PROCEDURE — 85049 AUTOMATED PLATELET COUNT: CPT | Performed by: SURGERY

## 2017-04-29 PROCEDURE — 25000128 H RX IP 250 OP 636: Performed by: SURGERY

## 2017-04-29 PROCEDURE — 25800025 ZZH RX 258: Performed by: STUDENT IN AN ORGANIZED HEALTH CARE EDUCATION/TRAINING PROGRAM

## 2017-04-29 RX ADMIN — ACETAMINOPHEN 650 MG: 325 SOLUTION ORAL at 18:37

## 2017-04-29 RX ADMIN — ENOXAPARIN SODIUM 80 MG: 80 INJECTION SUBCUTANEOUS at 18:20

## 2017-04-29 RX ADMIN — FLUCONAZOLE 200 MG: 2 INJECTION INTRAVENOUS at 12:02

## 2017-04-29 RX ADMIN — MULTIVITAMIN 15 ML: LIQUID ORAL at 07:55

## 2017-04-29 RX ADMIN — PIPERACILLIN SODIUM,TAZOBACTAM SODIUM 3.38 G: 3; .375 INJECTION, POWDER, FOR SOLUTION INTRAVENOUS at 07:53

## 2017-04-29 RX ADMIN — PANTOPRAZOLE SODIUM 40 MG: 40 TABLET, DELAYED RELEASE ORAL at 07:55

## 2017-04-29 RX ADMIN — OXYCODONE HYDROCHLORIDE 5 MG: 5 SOLUTION ORAL at 07:55

## 2017-04-29 RX ADMIN — OXYCODONE HYDROCHLORIDE 5 MG: 5 SOLUTION ORAL at 20:23

## 2017-04-29 RX ADMIN — CHLORHEXIDINE GLUCONATE 15 ML: 1.2 RINSE ORAL at 20:22

## 2017-04-29 RX ADMIN — INSULIN ASPART 1 UNITS: 100 INJECTION, SOLUTION INTRAVENOUS; SUBCUTANEOUS at 00:27

## 2017-04-29 RX ADMIN — ANTISEPTIC SURGICAL SCRUB: 0.04 SOLUTION TOPICAL at 07:56

## 2017-04-29 RX ADMIN — OXYCODONE HYDROCHLORIDE 5 MG: 5 SOLUTION ORAL at 04:34

## 2017-04-29 RX ADMIN — INSULIN ASPART 1 UNITS: 100 INJECTION, SOLUTION INTRAVENOUS; SUBCUTANEOUS at 07:51

## 2017-04-29 RX ADMIN — HEPARIN SODIUM 1650 UNITS/HR: 10000 INJECTION, SOLUTION INTRAVENOUS at 13:35

## 2017-04-29 RX ADMIN — ACETAMINOPHEN 650 MG: 325 SOLUTION ORAL at 04:40

## 2017-04-29 RX ADMIN — METOPROLOL TARTRATE 12.5 MG: 100 TABLET ORAL at 07:55

## 2017-04-29 RX ADMIN — PIPERACILLIN SODIUM,TAZOBACTAM SODIUM 3.38 G: 3; .375 INJECTION, POWDER, FOR SOLUTION INTRAVENOUS at 02:16

## 2017-04-29 RX ADMIN — OXYCODONE HYDROCHLORIDE 5 MG: 5 SOLUTION ORAL at 16:12

## 2017-04-29 RX ADMIN — OXYCODONE HYDROCHLORIDE 5 MG: 5 SOLUTION ORAL at 12:05

## 2017-04-29 RX ADMIN — OXYCODONE HYDROCHLORIDE 5 MG: 5 SOLUTION ORAL at 00:24

## 2017-04-29 RX ADMIN — INSULIN ASPART 1 UNITS: 100 INJECTION, SOLUTION INTRAVENOUS; SUBCUTANEOUS at 04:34

## 2017-04-29 RX ADMIN — METOPROLOL TARTRATE 12.5 MG: 100 TABLET ORAL at 20:23

## 2017-04-29 RX ADMIN — PIPERACILLIN SODIUM,TAZOBACTAM SODIUM 3.38 G: 3; .375 INJECTION, POWDER, FOR SOLUTION INTRAVENOUS at 20:22

## 2017-04-29 RX ADMIN — CHLORHEXIDINE GLUCONATE 15 ML: 1.2 RINSE ORAL at 07:54

## 2017-04-29 RX ADMIN — Medication 1 PACKET: at 07:56

## 2017-04-29 RX ADMIN — ACETAMINOPHEN 650 MG: 325 SOLUTION ORAL at 12:09

## 2017-04-29 RX ADMIN — PIPERACILLIN SODIUM,TAZOBACTAM SODIUM 3.38 G: 3; .375 INJECTION, POWDER, FOR SOLUTION INTRAVENOUS at 14:17

## 2017-04-29 RX ADMIN — ANTISEPTIC SURGICAL SCRUB: 0.04 SOLUTION TOPICAL at 20:23

## 2017-04-29 RX ADMIN — SODIUM CHLORIDE, POTASSIUM CHLORIDE, SODIUM LACTATE AND CALCIUM CHLORIDE: 600; 310; 30; 20 INJECTION, SOLUTION INTRAVENOUS at 07:52

## 2017-04-29 RX ADMIN — PANTOPRAZOLE SODIUM 40 MG: 40 TABLET, DELAYED RELEASE ORAL at 20:22

## 2017-04-29 RX ADMIN — HEPARIN SODIUM 1850 UNITS/HR: 10000 INJECTION, SOLUTION INTRAVENOUS at 10:27

## 2017-04-29 NOTE — PROGRESS NOTES
THORACIC & FOREGUT SURGERY    S:  No acute overnight events. Yesterday went to OR for neck wound exploration and EGD. Pt with no complaints this AM. Afebrile. Voiding, BMs without issue.    O:  Vitals:    04/28/17 2300 04/29/17 0000 04/29/17 0417 04/29/17 0730   BP: 115/78 122/80 110/43 121/79   BP Location:   Right arm Right arm   Cuff Size:       Pulse:       Resp:   16 16   Temp:   98.4  F (36.9  C) 97.1  F (36.2  C)   TempSrc:   Oral Oral   SpO2: 96% 93% 98% 97%   Weight:       Height:           Gen: A&Ox3, NAD  HEENT: L neck wound with scant drainage.  Pulm: Breathing non-labored  Abd: Soft, NDNT  Ext: WWP    Labs: none new    A/P: Jim Braden is a 56 year old male with history of LE DVT on lovenox, neoadjuvant chemo, s/p thoracoscopic/laparoscopic esophagectomy with cervical anastomosis, J tube, pharyngostomy tube placement 4/14 for lower esophageal adenocarcinoma.  CT chest/abd to assess for source of subQ air yesterday inconclusive for source.  EGD 4/24 with no evidence of leak.  Now with purulence from neck wound, explored at bedside.     - Continue antibiotics: Zosyn, Fluconazole  - F/U wound cultures  - Heparin gtt, may consider switch to therapeutic Lovenox if no further procedures planned    Discussed with fellow, Dr. Luna.    Corby Palumbo MD  PGY-1 General Surgery  [facility]

## 2017-04-29 NOTE — BRIEF OP NOTE
[facility], [place]    Brief Operative Note    Pre-operative diagnosis: Neck Wound Infection   Post-operative diagnosis As above  Procedure: Procedure(s):  Neck Wound Exploration and Washout, Esophagogastroduodenoscopy  - Wound Class: IV-Dirty or Infected  Surgeon: Surgeon(s) and Role:     * Lucy Nobles MD - Primary     * Isidoro Perez MD- Resident- Assisting   Anesthesia: General   Estimated blood loss: None  Drains: None  Specimens: * No specimens in log *  Findings:   Anastomosis appeared intact, negative bubble test, some purulence expressed, wound packed  Complications: None.  Implants: None.    - Okay to resume heparin gtt when returns to floor  - To remain NPO

## 2017-04-29 NOTE — ANESTHESIA POSTPROCEDURE EVALUATION
Patient: Jim Braden    Procedure(s):  Neck Wound Exploration and Washout, Esophagogastroduodenoscopy  - Wound Class: IV-Dirty or Infected    Diagnosis:Neck Wound Infection   Diagnosis Additional Information: No value filed.    Anesthesia Type:  General, ETT    Note:  Anesthesia Post Evaluation    Patient location during evaluation: PACU  Patient participation: Able to fully participate in evaluation  Level of consciousness: awake and alert  Pain management: adequate  Airway patency: patent  Cardiovascular status: acceptable  Respiratory status: acceptable  Hydration status: acceptable  PONV: none     Anesthetic complications: None          Last vitals:  Vitals:    04/28/17 1940 04/28/17 1945 04/28/17 2000   BP: (!) 153/96 (!) 163/97 (!) 156/92   Pulse:      Resp: 16 16 16   Temp: 36.6  C (97.8  F)  36.7  C (98  F)   SpO2: 100% 100% 97%         Electronically Signed By: Tresa Wills MD  April 28, 2017  8:09 PM

## 2017-04-29 NOTE — PROGRESS NOTES
"Surgery Brief Post-Op Check  Jim Braden MRN: [id number]    S: Patient reports feeling well and appreciative of cares. Pain controlled. Urinating spontaneously. Denies headache, chest pain, shortness of breath, nausea or emesis. No fevers or chills.    O: /80  Pulse 108  Temp 96.9  F (36.1  C) (Oral)  Resp 16  Ht 1.803 m (5' 11\")  Wt 78.9 kg (173 lb 14.4 oz)  SpO2 93%  BMI 24.25 kg/m2    UOP: spontaneous, unmeasured    No acute distress, interactive  Non labored respirations on room air  Neck incision covered with surgical dressings  Regular rate, regular rhythm, peripheral pulses 2+  Abdomen soft, non-tender, non distended  Extremities warm, well perfused  Alert, oriented    Post-op labs: heparin Xa level daily    A/P: 56 year old male POD#0 s/p neck wound exploration, washout, and EGD. No acute issues at this time.    - Scheduled tylenol and oxycodone for pain  - Monitor vitals  - Encourage IS  - Resume TFs  - LR @ 50  - Resume heparin gtt  - SCDs      - - - - - - - - - - - -  Yonathan Loco MD  PGY-1, General Surgery  [facility]  Pager: [phone]  Please contact primary team after 6am.  "

## 2017-04-29 NOTE — PLAN OF CARE
Problem: Goal Outcome Summary  Goal: Goal Outcome Summary  Outcome: No Change  Vitals:     04/28/17 2205 04/28/17 2300 04/29/17 0000 04/29/17 0417   BP: 126/76 115/78 122/80 110/43   BP Location: Right arm     Right arm   Cuff Size:           Pulse:           Resp: 16     16   Temp: 96.9  F (36.1  C)     98.4  F (36.9  C)   TempSrc: Oral     Oral   SpO2: 94% 96% 93% 98%   Weight:           Height:             Arrived from PACU @ 2040. A&Ox4. Afeb, AVSS. O2 sats 98% on RA. Denies SOB. LS clear. C/o pain in neck, given scheduled Oxy and tylenol x2 w/ some relief. Up w/ SBA. Voiding and not saving. Had loose/watery BM x2 overnight. NPO. Denies N/V. TF restarted via Jtube at 2200 @ goal of 110 mL/h, to be turned off at 8am as scheduled. Abdominal lap sites w/ dermabond, BINDU, CDI. L neck wound covered, dressing CDI. R CT dressing site leaking, changed x2 overnight. Hep gtt to be continued at previous rate of 2050 units/h. 10a redraw @ 0300 was 0.51. Hep gtt held for 30 minutes at 0405 and resumed at 0435 and decreasedd 200 units to 1850 units/h. 10a recheck set for 1030 this morning.  and 164, insulin given per orders. PIV w/ LR @ 50 mL/h, IV ABX given per orders. Pt voice wanting to start on clears this AM. Will address with team. Able to make needs known. Continue with POC.

## 2017-04-29 NOTE — ANESTHESIA CARE TRANSFER NOTE
Patient: Jim Braden    Procedure(s):  Neck Wound Exploration and Washout, Esophagogastroduodenoscopy  - Wound Class: IV-Dirty or Infected    Diagnosis: Neck Wound Infection   Diagnosis Additional Information: No value filed.    Anesthesia Type:   General, ETT     Note:  Airway :Face Mask  Patient transferred to:PACU  Comments: Pt suctioned and extubated once following commands and exhibiting return of muscle strength. Transferred to PACU on 6L O2 via facemask without issues.    HR 93, /100, SpO2 100%    Pauline Boyle MD    4/28/2017  7:39 PM        Vitals: (Last set prior to Anesthesia Care Transfer)    CRNA VITALS  4/28/2017 1901 - 4/28/2017 1938      4/28/2017             Pulse: 98    SpO2: 100 %                Electronically Signed By: Pauline Boyle MD  April 28, 2017  7:38 PM

## 2017-04-29 NOTE — PLAN OF CARE
Problem: Goal Outcome Summary  Goal: Goal Outcome Summary  Outcome: No Change  RN [id number]  B/P: 124/66, T: 97, P: 108, R: 16  Patient states neck discomfort is tolerable with scheduled OxyContin.  Right old chest tube dressing changed multiple times because of leaking.  Lap sites C/D/I.  Left neck wound dressing C/D/I.  Up independently. Voiding and not saving.  Abdomen soft with bowel sounds present, no BM today.  Report given to  nurse around 1400.  Patient transferred to .

## 2017-04-29 NOTE — PLAN OF CARE
Problem: Goal Outcome Summary  Goal: Goal Outcome Summary  Outcome: Improving  RN [id number]  B/P: 131/77, T: 97.3, P: 108, R: 18  Patient states abdominal pain is tolerable with scheduled Oxycodone every 4 hours, and Tylenol.  Left neck wound packing, and right chest tube site changed and teaching was done with patient and wife present.  Right chest tube site leaking and dressing changed x 2 today. Tube feeding infusing at goal rate of 110 ml/hr.  Abdomen soft with bowel sounds present, patient reports having 2 bowel movements today. Lap sites with dermabond C/D/I. Voiding adequate amount of urine. Up independently. Patient competent and demonstrated understanding of Lovenox shot and administration. Possible discharge in the am. Continue with POC.

## 2017-04-30 ENCOUNTER — APPOINTMENT (OUTPATIENT)
Dept: GENERAL RADIOLOGY | Facility: CLINIC | Age: 57
DRG: 357 | End: 2017-04-30
Attending: THORACIC SURGERY (CARDIOTHORACIC VASCULAR SURGERY)
Payer: COMMERCIAL

## 2017-04-30 LAB
BACTERIA SPEC CULT: NORMAL
GLUCOSE BLDC GLUCOMTR-MCNC: 113 MG/DL (ref 70–99)
GLUCOSE BLDC GLUCOMTR-MCNC: 121 MG/DL (ref 70–99)
GLUCOSE BLDC GLUCOMTR-MCNC: 134 MG/DL (ref 70–99)
GLUCOSE BLDC GLUCOMTR-MCNC: 137 MG/DL (ref 70–99)
GLUCOSE BLDC GLUCOMTR-MCNC: 150 MG/DL (ref 70–99)
GLUCOSE BLDC GLUCOMTR-MCNC: 91 MG/DL (ref 70–99)
Lab: NORMAL
MICRO REPORT STATUS: NORMAL
SPECIMEN SOURCE: NORMAL

## 2017-04-30 PROCEDURE — 25000132 ZZH RX MED GY IP 250 OP 250 PS 637: Performed by: STUDENT IN AN ORGANIZED HEALTH CARE EDUCATION/TRAINING PROGRAM

## 2017-04-30 PROCEDURE — 71020 XR CHEST 2 VW: CPT

## 2017-04-30 PROCEDURE — 25800025 ZZH RX 258: Performed by: STUDENT IN AN ORGANIZED HEALTH CARE EDUCATION/TRAINING PROGRAM

## 2017-04-30 PROCEDURE — 12000008 ZZH R&B INTERMEDIATE UMMC

## 2017-04-30 PROCEDURE — 25000132 ZZH RX MED GY IP 250 OP 250 PS 637: Performed by: SURGERY

## 2017-04-30 PROCEDURE — 00000146 ZZHCL STATISTIC GLUCOSE BY METER IP

## 2017-04-30 PROCEDURE — 25000128 H RX IP 250 OP 636: Performed by: STUDENT IN AN ORGANIZED HEALTH CARE EDUCATION/TRAINING PROGRAM

## 2017-04-30 PROCEDURE — 25000132 ZZH RX MED GY IP 250 OP 250 PS 637: Performed by: PHYSICIAN ASSISTANT

## 2017-04-30 PROCEDURE — 25000132 ZZH RX MED GY IP 250 OP 250 PS 637: Performed by: THORACIC SURGERY (CARDIOTHORACIC VASCULAR SURGERY)

## 2017-04-30 PROCEDURE — 27210429 ZZH NUTRITION PRODUCT INTERMEDIATE LITER

## 2017-04-30 RX ADMIN — Medication 1 LOZENGE: at 16:38

## 2017-04-30 RX ADMIN — CHLORHEXIDINE GLUCONATE 15 ML: 1.2 RINSE ORAL at 20:09

## 2017-04-30 RX ADMIN — OXYCODONE HYDROCHLORIDE 5 MG: 5 SOLUTION ORAL at 00:19

## 2017-04-30 RX ADMIN — ENOXAPARIN SODIUM 80 MG: 80 INJECTION SUBCUTANEOUS at 17:48

## 2017-04-30 RX ADMIN — SODIUM CHLORIDE, POTASSIUM CHLORIDE, SODIUM LACTATE AND CALCIUM CHLORIDE: 600; 310; 30; 20 INJECTION, SOLUTION INTRAVENOUS at 08:53

## 2017-04-30 RX ADMIN — ENOXAPARIN SODIUM 80 MG: 80 INJECTION SUBCUTANEOUS at 06:11

## 2017-04-30 RX ADMIN — OXYCODONE HYDROCHLORIDE 5 MG: 5 SOLUTION ORAL at 20:09

## 2017-04-30 RX ADMIN — OXYCODONE HYDROCHLORIDE 5 MG: 5 SOLUTION ORAL at 11:54

## 2017-04-30 RX ADMIN — METOPROLOL TARTRATE 12.5 MG: 100 TABLET ORAL at 08:56

## 2017-04-30 RX ADMIN — PIPERACILLIN SODIUM,TAZOBACTAM SODIUM 3.38 G: 3; .375 INJECTION, POWDER, FOR SOLUTION INTRAVENOUS at 14:07

## 2017-04-30 RX ADMIN — MULTIVITAMIN 15 ML: LIQUID ORAL at 08:55

## 2017-04-30 RX ADMIN — HYDROXYZINE HYDROCHLORIDE 25 MG: 10 SYRUP ORAL at 20:08

## 2017-04-30 RX ADMIN — PIPERACILLIN SODIUM,TAZOBACTAM SODIUM 3.38 G: 3; .375 INJECTION, POWDER, FOR SOLUTION INTRAVENOUS at 20:14

## 2017-04-30 RX ADMIN — ACETAMINOPHEN 650 MG: 325 SOLUTION ORAL at 16:45

## 2017-04-30 RX ADMIN — ACETAMINOPHEN 650 MG: 325 SOLUTION ORAL at 11:54

## 2017-04-30 RX ADMIN — FLUCONAZOLE 200 MG: 2 INJECTION INTRAVENOUS at 11:54

## 2017-04-30 RX ADMIN — INSULIN ASPART 1 UNITS: 100 INJECTION, SOLUTION INTRAVENOUS; SUBCUTANEOUS at 08:56

## 2017-04-30 RX ADMIN — PANTOPRAZOLE SODIUM 40 MG: 40 TABLET, DELAYED RELEASE ORAL at 20:08

## 2017-04-30 RX ADMIN — PIPERACILLIN SODIUM,TAZOBACTAM SODIUM 3.38 G: 3; .375 INJECTION, POWDER, FOR SOLUTION INTRAVENOUS at 02:11

## 2017-04-30 RX ADMIN — Medication 1 PACKET: at 08:56

## 2017-04-30 RX ADMIN — METOPROLOL TARTRATE 12.5 MG: 100 TABLET ORAL at 20:09

## 2017-04-30 RX ADMIN — Medication 1 LOZENGE: at 12:41

## 2017-04-30 RX ADMIN — HYDROXYZINE HYDROCHLORIDE 10 MG: 10 SYRUP ORAL at 11:00

## 2017-04-30 RX ADMIN — PANTOPRAZOLE SODIUM 40 MG: 40 TABLET, DELAYED RELEASE ORAL at 08:00

## 2017-04-30 RX ADMIN — ACETAMINOPHEN 650 MG: 325 SOLUTION ORAL at 03:57

## 2017-04-30 RX ADMIN — ACETAMINOPHEN 650 MG: 325 SOLUTION ORAL at 08:54

## 2017-04-30 RX ADMIN — PIPERACILLIN SODIUM,TAZOBACTAM SODIUM 3.38 G: 3; .375 INJECTION, POWDER, FOR SOLUTION INTRAVENOUS at 08:55

## 2017-04-30 NOTE — OP NOTE
DATE OF PROCEDURE:  2017      PREOPERATIVE DIAGNOSIS:  Suspected leak post-esophagectomy.        PROCEDURE PERFORMED:   1.  Esophagogastroscopy.   2.  Fluoroscopy with interpretation of images.   3.  Incision and drainage of left neck wound.      SURGEON:  Mayr Pena MD (present and participated in the entire procedure).      RESIDENT SURGEON:  Isidoro Perez MD      ANESTHESIA:  General.      ESTIMATED BLOOD LOSS:  5 mL.      COMPLICATIONS:  None immediate.      FINDINGS:  We could not find any evidence of a leak.  The anastomotic stenosis looked intact endoscopically, there was no leakage upon injection of contrast material, hence the neck wound was clean.  We could not elucidate any bubbling, insufflation and irrigation of the wound.      DESCRIPTION OF PROCEDURE:  With Jim Braden in supine position under general anesthesia, we performed an esophagogastroscopy with the above-mentioned findings.  We then injected contrast under fluoroscopic guidance and could not see a leak.  Finally, we reopened the neck incision and placed some saline in the neck incision and dissected it way down into the mediastinum, placed saline, and insufflation could not elucidate the leak either.  The neck was packed.      The patient tolerated the procedure well.         MARY PENA MD             D: 2017 09:54   T: 2017 08:00   MT: LQ      Name:     JIM BRADEN   MRN:      [id number]        Account:        [id number]   :      1960           Procedure Date: 2017      Document: [id number]

## 2017-04-30 NOTE — PLAN OF CARE
Problem: Goal Outcome Summary  Goal: Goal Outcome Summary  Outcome: Improving  Vitals:     04/29/17 1149 04/29/17 1655 04/29/17 2100 04/30/17 0015   BP: 115/71 131/77   122/76   BP Location:   Right arm   Right arm   Cuff Size:           Pulse:           Resp: 16 18 18   Temp: 98.3  F (36.8  C) 97.3  F (36.3  C)   96.4  F (35.8  C)   TempSrc:   Oral   Oral   SpO2: 96% 93%   93%   Weight:     78.7 kg (173 lb 7.6 oz)     Height:             A&Ox4. Afeb, AVSS. O2 sats 93% on RA. Denies SOB. LS clear. Pain well controlled w/ scheduled Oxy and tylenol x1. Pt refused last Oxy dose at 0400. Up ad fabi. Voiding adequately. + BS, + flatus, 1 BM overnight. NPO. Denies N/V. TF via Jtube @ goal of 110 mL/h, to be turned off at 8am as scheduled. Abdominal lap sites w/ dermabond, BINDU, CDI. L neck wound covered, dressing CDI. R CT dressing site leaking, changed x2 overnight.  and 121. Reinforce teaching on Lovenox and pt self-administered. PIV w/ LR @ 50 mL/h, IV ABX given per orders. Possible DC today. Able to make needs known. Continue with POC.

## 2017-04-30 NOTE — PLAN OF CARE
Problem: Goal Outcome Summary  Goal: Goal Outcome Summary  Outcome: Improving  B/P: 123/78, T: 97.6, P: 108, R: 16  Patient reports neck and abdominal discomfort tolerable with Oxycodone and Tylenol.  Right old chest tube dressing changed x 3 today.  J-tube intact clamped.  Voiding adequate amounts of urine.  Abdomen soft with bowel sounds present, patient states he has had 2 loose BM's today.  Up independently.  Abdominal lap sites C/D/I.  Family at bed side most of the day.  Continue with POC.

## 2017-04-30 NOTE — OP NOTE
DATE OF PROCEDURE:  2017      OPERATING SURGEON:  Phylicia Harman MD       ASSISTING SURGEON:  Isidoro Perez MD      PREOPERATIVE DIAGNOSIS:  Subcutaneous emphysema and purulent drainage from the neck wound, status post minimally invasive 3-hole esophagectomy.      POSTOPERATIVE DIAGNOSIS:  Subcutaneous emphysema and purulent drainage from the neck wound, status post minimally invasive 3-hole esophagectomy.      PROCEDURE PERFORMED:  Esophagogastroduodenoscopy and neck wound exploration and washout.      DESCRIPTION OF PROCEDURE:  After obtaining informed consent, Jim Krause was brought to the operating room and laid supine on the operating table.  After induction of general anesthesia, and introduction of an endotracheal tube, a pediatric endoscope was passed per orally into the esophagus, through the anastomosis into the conduit.  There were no significant findings.  The anastomosis actually appeared quite well healed.  There was a small crevice which was not completely visualize due to some secretions, but there were no obvious defects.  All the mucosa looked healthy and viable.  At the same time, the neck wound that was already open was dissected further and washed out.  We created a pool of water here and insufflated using the gastroscope.  There were some questionable bubbles but nothing obvious.  We then continued to wash the space out and after dissecting further in the pretracheal plane, did a thorough washout and suction and this wound was then packed with Kerlix and a dry dressing was applied.  The patient was then recovered and transferred back to the recovery room in stable condition.         PHYLICIA HARMAN MD             D: 2017 19:29   T: 2017 20:40   MT: LQ      Name:     JIM BASHIR   MRN:      [id number]        Account:        [id number]   :      1960           Procedure Date: 2017      Document: [id number]

## 2017-04-30 NOTE — PROGRESS NOTES
THORACIC & FOREGUT SURGERY    S:  No acute overnight events. Pt with a persistent cough, minimally productive of clear sputum. Otherwise no complaints this AM. Afebrile. Voiding, BMs without issue.    O:  Temp:  [96.4  F (35.8  C)-97.6  F (36.4  C)] 97.6  F (36.4  C)  Heart Rate:  [] 90  Resp:  [16-18] 16  BP: (122-131)/(71-78) 123/78  SpO2:  [93 %-97 %] 97 %     I/O last 3 completed shifts:  In: 1820 [I.V.:1530; NG/GT:180]  Out: 875 [Urine:875]    Gen: A&Ox3, NAD  HEENT: L neck wound with scant drainage.  Pulm: Breathing non-labored  Abd: Soft, NDNT  Ext: WWP    Labs: none new  CXR reviewed.    A/P: Jim Braden is a 56 year old male with history of LE DVT on lovenox, neoadjuvant chemo, s/p thoracoscopic/laparoscopic esophagectomy with cervical anastomosis, J tube, pharyngostomy tube placement 4/14 for lower esophageal adenocarcinoma.  CT chest/abd to assess for source of subQ air yesterday inconclusive for source. EGD 4/24 with no evidence of leak.  Now with purulence from neck wound, explored at bedside.     - Continue antibiotics: Zosyn, Fluconazole.   - F/U wound cultures  - Switched to therapeutic Lovenox yesterday  - Pulmonary toilet  - 7B, DC pending culture sensitivities and appropriate PO Abx.    Discussed with fellow, Dr. Luna.    Corby Palumbo MD  PGY-1 General Surgery  [facility]

## 2017-05-01 ENCOUNTER — APPOINTMENT (OUTPATIENT)
Dept: SPEECH THERAPY | Facility: CLINIC | Age: 57
DRG: 357 | End: 2017-05-01
Attending: THORACIC SURGERY (CARDIOTHORACIC VASCULAR SURGERY)
Payer: COMMERCIAL

## 2017-05-01 VITALS
WEIGHT: 173.48 LBS | TEMPERATURE: 98.3 F | HEIGHT: 71 IN | OXYGEN SATURATION: 94 % | SYSTOLIC BLOOD PRESSURE: 126 MMHG | DIASTOLIC BLOOD PRESSURE: 72 MMHG | BODY MASS INDEX: 24.29 KG/M2 | HEART RATE: 108 BPM | RESPIRATION RATE: 16 BRPM

## 2017-05-01 LAB
BACTERIA SPEC CULT: ABNORMAL
CRP SERPL-MCNC: 13 MG/L (ref 0–8)
GLUCOSE BLDC GLUCOMTR-MCNC: 106 MG/DL (ref 70–99)
GLUCOSE BLDC GLUCOMTR-MCNC: 117 MG/DL (ref 70–99)
GLUCOSE BLDC GLUCOMTR-MCNC: 124 MG/DL (ref 70–99)
GLUCOSE BLDC GLUCOMTR-MCNC: 143 MG/DL (ref 70–99)
Lab: ABNORMAL
MICRO REPORT STATUS: ABNORMAL
MICROORGANISM SPEC CULT: ABNORMAL
SPECIMEN SOURCE: ABNORMAL

## 2017-05-01 PROCEDURE — 27210429 ZZH NUTRITION PRODUCT INTERMEDIATE LITER

## 2017-05-01 PROCEDURE — 92526 ORAL FUNCTION THERAPY: CPT | Mod: GN

## 2017-05-01 PROCEDURE — 00000146 ZZHCL STATISTIC GLUCOSE BY METER IP

## 2017-05-01 PROCEDURE — 25000132 ZZH RX MED GY IP 250 OP 250 PS 637: Performed by: PHYSICIAN ASSISTANT

## 2017-05-01 PROCEDURE — 25000132 ZZH RX MED GY IP 250 OP 250 PS 637: Performed by: THORACIC SURGERY (CARDIOTHORACIC VASCULAR SURGERY)

## 2017-05-01 PROCEDURE — 86140 C-REACTIVE PROTEIN: CPT | Performed by: SURGERY

## 2017-05-01 PROCEDURE — 25000128 H RX IP 250 OP 636: Performed by: STUDENT IN AN ORGANIZED HEALTH CARE EDUCATION/TRAINING PROGRAM

## 2017-05-01 PROCEDURE — 25000132 ZZH RX MED GY IP 250 OP 250 PS 637: Performed by: SURGERY

## 2017-05-01 PROCEDURE — 92507 TX SP LANG VOICE COMM INDIV: CPT | Mod: GN

## 2017-05-01 PROCEDURE — 40000225 ZZH STATISTIC SLP WARD VISIT

## 2017-05-01 PROCEDURE — 25000132 ZZH RX MED GY IP 250 OP 250 PS 637: Performed by: STUDENT IN AN ORGANIZED HEALTH CARE EDUCATION/TRAINING PROGRAM

## 2017-05-01 RX ORDER — AMOXICILLIN AND CLAVULANATE POTASSIUM 400; 57 MG/5ML; MG/5ML
500 POWDER, FOR SUSPENSION ORAL 2 TIMES DAILY
Status: DISCONTINUED | OUTPATIENT
Start: 2017-05-01 | End: 2017-05-01 | Stop reason: HOSPADM

## 2017-05-01 RX ORDER — ONDANSETRON 4 MG/1
4 TABLET, ORALLY DISINTEGRATING ORAL EVERY 6 HOURS PRN
Qty: 120 TABLET | Refills: 0 | Status: SHIPPED | OUTPATIENT
Start: 2017-05-01

## 2017-05-01 RX ORDER — BISACODYL 10 MG
10 SUPPOSITORY, RECTAL RECTAL DAILY PRN
Qty: 30 SUPPOSITORY | Refills: 0 | Status: SHIPPED | OUTPATIENT
Start: 2017-05-01

## 2017-05-01 RX ORDER — POLYETHYLENE GLYCOL 3350 17 G/17G
17 POWDER, FOR SOLUTION ORAL DAILY
Qty: 30 PACKET | Refills: 0 | Status: SHIPPED | OUTPATIENT
Start: 2017-05-01

## 2017-05-01 RX ORDER — AMOXICILLIN AND CLAVULANATE POTASSIUM 400; 57 MG/5ML; MG/5ML
500 POWDER, FOR SUSPENSION ORAL 2 TIMES DAILY
Qty: 126 ML | Refills: 0 | Status: SHIPPED | OUTPATIENT
Start: 2017-05-01 | End: 2017-05-11

## 2017-05-01 RX ORDER — OXYCODONE HCL 5 MG/5 ML
5 SOLUTION, ORAL ORAL EVERY 4 HOURS PRN
Qty: 150 ML | Refills: 0 | Status: SHIPPED | OUTPATIENT
Start: 2017-05-01

## 2017-05-01 RX ADMIN — PIPERACILLIN SODIUM,TAZOBACTAM SODIUM 3.38 G: 3; .375 INJECTION, POWDER, FOR SOLUTION INTRAVENOUS at 02:10

## 2017-05-01 RX ADMIN — Medication 1 PACKET: at 09:49

## 2017-05-01 RX ADMIN — ACETAMINOPHEN 650 MG: 325 SOLUTION ORAL at 09:46

## 2017-05-01 RX ADMIN — METOPROLOL TARTRATE 12.5 MG: 100 TABLET ORAL at 09:47

## 2017-05-01 RX ADMIN — AMOXICILLIN AND CLAVULANATE POTASSIUM 500 MG: 400; 57 POWDER, FOR SUSPENSION ORAL at 12:57

## 2017-05-01 RX ADMIN — OXYCODONE HYDROCHLORIDE 5 MG: 5 SOLUTION ORAL at 12:57

## 2017-05-01 RX ADMIN — PIPERACILLIN SODIUM,TAZOBACTAM SODIUM 3.38 G: 3; .375 INJECTION, POWDER, FOR SOLUTION INTRAVENOUS at 09:47

## 2017-05-01 RX ADMIN — PANTOPRAZOLE SODIUM 40 MG: 40 TABLET, DELAYED RELEASE ORAL at 09:47

## 2017-05-01 RX ADMIN — INSULIN ASPART 1 UNITS: 100 INJECTION, SOLUTION INTRAVENOUS; SUBCUTANEOUS at 04:07

## 2017-05-01 RX ADMIN — MULTIVITAMIN 15 ML: LIQUID ORAL at 09:47

## 2017-05-01 RX ADMIN — ACETAMINOPHEN 650 MG: 325 SOLUTION ORAL at 04:12

## 2017-05-01 RX ADMIN — OXYCODONE HYDROCHLORIDE 5 MG: 5 SOLUTION ORAL at 09:46

## 2017-05-01 RX ADMIN — ENOXAPARIN SODIUM 80 MG: 80 INJECTION SUBCUTANEOUS at 09:47

## 2017-05-01 RX ADMIN — OXYCODONE HYDROCHLORIDE 5 MG: 5 SOLUTION ORAL at 00:05

## 2017-05-01 RX ADMIN — CHLORHEXIDINE GLUCONATE 15 ML: 1.2 RINSE ORAL at 09:47

## 2017-05-01 ASSESSMENT — PAIN DESCRIPTION - DESCRIPTORS: DESCRIPTORS: SORE

## 2017-05-01 NOTE — PROGRESS NOTES
CLINICAL NUTRITION SERVICES - REASSESSMENT NOTE     Nutrition Prescription    RECOMMENDATIONS FOR MDs/PROVIDERS TO ORDER:  - None at this time    Malnutrition Status:    - Patient does not meet two of the above criteria necessary for diagnosing malnutrition, but is at risk    Recommendations already ordered by Registered Dietitian (RD):  - None at this time     Future/Additional Recommendations:  -- If pt prefers to due free water flushes during the day to get free water, recommend 150 mL water flushes 6 times per day + 60 mL water flushes before and after TF cycle (additional 1020 mL water per day. TF + free water flushes + free water given with BID beneprotein admin = 2358 mL/day free water (31 mL/kg)  -- Would rec pt not cycle any more, as rate will be too high     EVALUATION OF THE PROGRESS TOWARD GOALS   Diet: NPO  Nutrition Support: Isosource 1.5 @ 110ml/hr from 4pm-8am (16 hours), + 1 pkt Nutrisource. TF water flushes, 65 ml/hr x 16 hours during TF cycle. (Isosource 1.5 @ 110 mL/hr x 16 hours (1760 mL/day) to provide 2640 kcal, 119 g PRO, and 1338 mL/day free water). Dosing wt 77kg.     Intake:  Pt transitioned from Impact Peptide 1.5 @ goal rate of 105ml/hr x 16 hours over night, to Isosource 1.5 @ 110 ml/hr x 16 hours over cycled night feed. Over the past 7 days, the pt has received on average 964ml of formula per day. This average results in about 1446 kcals/day (19kcal/kg, 54-62%) 66-90 g/pro/day (.8-1.2 g/kg pro, 42-57%) between the two formulas and transition. 1 pkt Nutrisource will add 15kcals/day as well as 3g fiber. Unsure if documentation is correct.     NEW FINDINGS   Weight: ~3lb wt gain since last encounter 3 days ago.  Labs: Cr: 94.5 kg (L), Glucose trending upwards  Meds: Multivit w/ minerals  GI: Multiple BM+s noted per day (x1-x3)      MALNUTRITION  % Intake: < 75% for > 7 days (non-severe)  % Weight Loss: meets criteria > 2% in 1 week (severe), however unclear at this time if all true weight  loss given how quickly weight went down  Subcutaneous Fat Loss: None observed   Muscle Loss: None observed  Fluid Accumulation/Edema: None noted per chart review  Malnutrition Diagnosis: Patient does not meet two of the above criteria necessary for diagnosing malnutrition, but is at risk    Previous Goals   Total avg nutritional intake to meet a minimum of 30 kcal/kg and 1.5 g PRO/kg daily (per dosing wt 84 kg).  Evaluation: Pt dosing wt changed, however this has still not been met.     Previous Nutrition Diagnosis  Inadequate protein-energy intake related to TF advancing to goal this week and TF infusion interruptions as evidenced by TF off x 1 day for procedure and 7-day avg TF intake meeting 57% kcal needs and 74% PRO needs  Evaluation: Improving    CURRENT NUTRITION DIAGNOSIS  Inadequate protein-energy intake related to TF infusion interruptions, switching of TF to maintenance and reliance of TF as evidenced by 7-day avg TF intakes meeting 54% kcals and 42% pro needs    INTERVENTIONS  Implementation  Continue to monitor tolerance of TF    Goals  Total avg nutritional intake to meet a minimum of 30 kcal/kg and 1.5 g PRO/kg daily (per dosing wt 77 kg).    Monitoring/Evaluation  Progress toward goals will be monitored and evaluated per protocol.    Violeta Spencer, RD, MS, LD

## 2017-05-01 NOTE — PROGRESS NOTES
Care Coordinator  D: Jim Braden is a 56 year old male with history of LE DVT on lovenox, neoadjuvant chemo, s/p thoracoscopic/laparoscopic esophagectomy with cervical anastomosis, J tube, pharyngostomy tube placement 4/14 for lower esophageal adenocarcinoma. CT chest/abd to assess for source of subQ air yesterday inconclusive for source. EGD 4/24 with no evidence of leak. Now with purulence from neck wound, explored at bedside--note per Dr Palumbo today and he may d/c to home with enteral feeds 4p-8am today.  I: Dr Corby Palumbo signed d/c orders at approx 1330--[facility], [place] [phone] said they can have tube feeding pump/bags and IV pole delivered to pt's room after 4pm today--Bedside RN will hook pt up to enteral feeds BEFORE discharge/for home and pt is aware of this,--[facility] is supplying formula and per Sabra it has been delivered, and pt confirmed this. [facility] [phone] is working on an afternoon visit for tomorrrow (pt says he is comfortable unhooking his TF @ 0800--so visit will be to f/u teaching/post op check). Bedside nurse is Mike, and she is aware of plan.  A: d/c to home later evening  P: [facility] to follow and RTC per MD orders.

## 2017-05-01 NOTE — PLAN OF CARE
Problem: Individualization  Goal: Patient Preferences  Afeb, vitals stable, 02 sats 93% on room air, abd incision dry, intact and dermabonded, belly round with hypo bowel sounds and gas, lungs clear, up to bathroom x2, voiding not saving, 2 stools per patient, glucoses 117, no coverage, and 143, covered per sliding scale, right CT drsg dry and intact, left neck drsg intact, up walking in the halls x2, offers no c/o, appeared to sleep between cares

## 2017-05-01 NOTE — PROGRESS NOTES
THORACIC & FOREGUT SURGERY    S:  No acute overnight events. Cough improving. Otherwise no complaints this AM. Afebrile. Voiding, BMs without issue. Per discussion with patient he has never been on warfarin, was started on therapeutic Lovenox by his oncologist in [place]. He has Onc follow-up appointment already scheduled for 5/10/17.    O:  Temp:  [97.3  F (36.3  C)-98.3  F (36.8  C)] 98.3  F (36.8  C)  Heart Rate:  [90-96] 96  Resp:  [16] 16  BP: (123-132)/(67-78) 126/72  SpO2:  [93 %-97 %] 94 %     I/O last 3 completed shifts:  In: 2444 [P.O.:50; I.V.:244; NG/GT:610]  Out: 675 [Urine:675]    Gen: A&Ox3, NAD  HEENT: L neck wound with scant serous drainage.  Pulm: Breathing non-labored  Abd: Soft, NDNT  Ext: WWP    Labs: none new  CXR reviewed, no significant changes.    A/P: Jim Braden is a 56 year old male with history of LE DVT on lovenox, neoadjuvant chemo, s/p thoracoscopic/laparoscopic esophagectomy with cervical anastomosis, J tube, pharyngostomy tube placement 4/14 for lower esophageal adenocarcinoma.  CT chest/abd to assess for source of subQ air yesterday inconclusive for source. EGD 4/24 with no evidence of leak.  Now with purulence from neck wound, explored at bedside.     - Will discuss with pharmacy on appropriate oral Abx for discharge  - Therapeutic Lovenox, will DC with continued Lovenox until Oncology appointment  - Pulmonary toilet  - 7B, DC likely today pending appropriate PO Abx.    Discussed with fellow, Dr. Luna.    Corby Palumbo MD  PGY-1 General Surgery  [facility]

## 2017-05-01 NOTE — PLAN OF CARE
Problem: Goal Outcome Summary  Goal: Goal Outcome Summary  ST 7B: Will continue to defer to medical team for appropriateness of PO diet-- anticipate Pt will remain NPO until follow up visit with Thoracic as outpatient. Encourage Pt completion of pharyngeal strengthening exercises. Likely no ongoing ST needs post discharge.

## 2017-05-01 NOTE — DISCHARGE SUMMARY
[facility] Discharge Summary    Jim Braden MRN: [id number]   YOB: 1960 Age: 56 year old     Date of Admission:  4/14/2017  Date of Discharge::  5/1/2017  6:45 PM  Admitting Physician:  Lexus Carrington MD  Discharge Physician:  Lucy Nobles MD  Primary Care Physician:         Edith Dan          Admission Diagnoses:   Esophageal adenocarcinoma (H) [C15.9]          Discharge Diagnosis:   Esophageal adenocarcinoma (H) [C15.9]         Procedures:   4/14/17:   Thoracoscopic Laparoscopic Esophagectomy with cervical anastamosis, Laparoscopic Jejunostomy Tube, Pharyngostomy Tube Placement    4/24/17:   EGD with removal of pharyngostomy tube    4/28/17:  Esophagogastroduodenoscopy and neck wound exploration and washout         Consultations:   None          Brief History of Illness:   From pre-op H&P:  Jim Braden is a 56 year old male who presents for pre-operative H & P in preparation for Thoracoscopic Laparoscopic Esophagectomy, Laparoscopic Jejunostomy Tube, Upper Endoscopy, Possible Open on 4/14/17 with Dr. Jeronimo Mendez at [facility].      The patient was first evaluated by Dr. Mendez on 1/11/17 in regards to distal esophageal adenocarcinoma. At that time, tentative arrangements were made for surgery, after the patient had completed neoadjuvant therapy. The patient was also instructed to abstain from alcohol for at least 3 weeks prior to surgery. He was reevaluated on 3/15, and had completed his Folfox chemotherapy on 2/15.            Hospital Course:   The patient underwent esophagectomy on 4/14/17, which he tolerated well. He was transferred to SICU as planned for postoperative monitoring and care. He was transferred to the floor on POD 2. Tube feeds were started and advanced to goal which the patient tolerated well. The patient underwent repeat swallow studies and esophagrams for assessment of anastomosis and observation of aspiration  during these studies, and was brought to OR for EGD and pharyngostomy removal on 4/24. Under direct visualization the anastomosis was found to be intact.     Right chest tube was removed on 4/26 (POD 12) without issue.    The patient was noted to have frankly purulent drainage from neck wound on 4/27 (POD 13) and this wound was opened and explored at bedside. The patient was started on Zosyn and Fluconazole IV for wound infection. He was brought back to OR on 4/28 for exploration of neck wound, which was found not to be communicating with the anastomosis. The wound was packed and then changed daily at the bedside.    At the time of discharge, he was tolerating ice chips with tube feeds at goal, ambulating, voiding spontaneously without difficulty, and pain was controlled with oral pain medications. The patient was discharged home in stable and improved condition. He will follow up in clinic as scheduled.         Final Pathology Result:   SPECIMEN(S):   A: Lymph node, subcarinal   B: Level 7   C: Lymph node, left gastric artery   D: Lymph node, common hepatic artery   E: Final gastric margin   F: Esophagogastectomy     FINAL DIAGNOSIS:   A. LYMPH NODE, SUBCARINAL, EXCISION:   - One lymph node, negative for malignancy (0/1)     B. LYMPH NODES, LEVEL 7, EXCISION:   - Two lymph nodes, negative for malignancy (0//2)     C. LYMPH NODE, LEFT GASTRIC ARTERY, EXCISION:   - Fragments of benign fibroadipose tissue, negative for malignancy     D. LYMPH NODE, COMMON HEPATIC ARTERY, EXCISION:   - Fragments of lymph node tissue, negative for malignancy (0/1)     E. STOMACH, FINAL GASTRIC MARGIN, RESECTION:   - Unremarkable gastric tissue with body type mucosa and omental fatty   tissue, negative for malignancy     F. ESOPHAGUS AND STOMACH, ESOPHAGOGASTRECTOMY:   - Residual invasive adenocarcinoma, moderate-poorly differentiated   - Tumor extends to the submucosa (ypT1b)   - Lymphovascular invasion: Present (extensive)   -  Perineural invasion: Not identified   - Therapy response: Poor   - Adjacent unremarkable esophagus and stomach   - Resection margins uninvolved by tumor   - Nine of twenty lymph nodes involved by metastatic tumor (9/20, this   count represents specimen F nodes)   - See tumor synopsis below     COMMENT:   The final diagnosis confirms the interpretation provided   intraoperatively.     Report Name: Esophagus   Status: Submitted     Part(s) Involved:   F: Esophagogastectomy     Synoptic Report:     CLINICAL     Clinical History:         GERD     SPECIMEN     Specimen:         - Esophagus         - Proximal stomach     Procedure:         - Esophagogastrectomy     TUMOR     Primary Tumor Site:         - Distal esophagus (lower thoracic esophagus)     Relationship of Tumor to Esophagogastric Junction:         - Tumor midpoint lies in the distal esophagus AND tumor involves   the         esophagogastric junction     Distance of Tumor Center from Esophagogastric Junction: 0.6 cm     Histologic Type:         - Adenocarcinoma     Histologic Grade:         - G3:  Poorly differentiated     Tumor Size: Cannot be determined - clusters of ulcerated mucosa     Tumor Extent       Site(s) of Direct Extent of Tumor:           - Distal esophagus (lower thoracic esophagus)           - Esophagogastric junction (EGJ)       Microscopic Tumor Extension:           - Tumor invades submucosa     Accessory Tumor Findings       Treatment Effect:           - Extensive residual cancer with no evident tumor regression   (poor or no           response, score 3)       Lymph-Vascular Invasion:           - Present       Perineural Invasion:           - Not identified     MARGINS     Proximal Margin:         - Uninvolved by invasive carcinoma       Involvement by Dysplasia:           - Uninvolved by dysplasia     Distal Margin:         - Uninvolved by invasive carcinoma       Involvement by Dysplasia:           - Uninvolved by dysplasia      Circumferential (Adventitial) Margin (esophagectomy or   esophagogastrectomy     specimens) or Deep Margin (endoscopic resection specimens):         - Uninvolved by invasive carcinoma     Mucosal Margin:         - No endoscopic resection performed     LYMPH NODES     Number of Lymph Nodes Examined: 24     Number of Lymph Nodes Involved: 9     STAGE (PTNM)     TNM Descriptors:         - y (post-treatment)     Primary Tumor (pT):         - pT1b: Tumor invades submucosa     Regional Lymph Nodes (pN):         - pN3: 7 or more nodes involved     ADDITIONAL FINDINGS     Additional Pathologic Findings:         - None identified     COMMENTS    Blocks F3 and F9 for future ancillary tests.     CAP eCC March 2016 Annual Release     I have personally reviewed all specimens and or slides, including the   listed special stains, and used them with my medical judgement to   determine the final diagnosis.     Electronically signed out by:     Carlos Jansen M.D          Medications Prior to Admission:     Prescriptions Prior to Admission   Medication Sig Dispense Refill Last Dose     ACETAMINOPHEN PO Take 500-1,000 mg by mouth every 8 hours as needed for pain   4/13/2017 at 1600     omeprazole (PRILOSEC) 20 MG CR capsule Take 20 mg by mouth every morning Reported on 3/15/2017   4/13/2017 at 1600     lisinopril (PRINIVIL/ZESTRIL) 5 MG tablet Take 5 mg by mouth every evening Reported on 3/15/2017   Past Month at Unknown time     [DISCONTINUED] enoxaparin (LOVENOX) 80 MG/0.8ML injection Inject 80 mg Subcutaneous 2 times daily   Past Month at Unknown time            Discharge Medications:     Current Discharge Medication List      START taking these medications    Details   amoxicillin-clavulanate (AUGMENTIN) 400-57 MG/5ML suspension 6.3 mLs (500 mg) by Per J Tube route 2 times daily for 10 days  Qty: 126 mL, Refills: 0    Associated Diagnoses: History of esophagectomy      bisacodyl (DULCOLAX) 10 MG Suppository Place 1 suppository  (10 mg) rectally daily as needed for constipation  Qty: 30 suppository, Refills: 0    Associated Diagnoses: History of esophagectomy      ondansetron (ZOFRAN-ODT) 4 MG ODT tab Take 1 tablet (4 mg) by mouth every 6 hours as needed for nausea  Qty: 120 tablet, Refills: 0    Associated Diagnoses: History of esophagectomy      oxyCODONE (ROXICODONE) 5 MG/5ML solution 5 mLs (5 mg) by Per J Tube route every 4 hours as needed for moderate to severe pain  Qty: 150 mL, Refills: 0    Associated Diagnoses: History of esophagectomy      pantoprazole (PROTONIX) SUSP suspension 20 mLs (40 mg) by Per Feeding Tube route 2 times daily for 14 days  Qty: 560 mL, Refills: 0    Associated Diagnoses: History of esophagectomy      phenol (CHLORASEPTIC) 1.4 % LIQD spray Take 1 spray (1 mL) by mouth every hour as needed for sore throat  Qty: 177 mL, Refills: 0    Associated Diagnoses: History of esophagectomy      polyethylene glycol (MIRALAX/GLYCOLAX) Packet 17 g by Per Feeding Tube route daily  Qty: 30 packet, Refills: 0    Associated Diagnoses: History of esophagectomy      sennosides (SENOKOT) 1.76 mg/mL syrup 5-10 mLs by Per J Tube route daily as needed for constipation  Qty: 105 mL, Refills: 0    Associated Diagnoses: History of esophagectomy         CONTINUE these medications which have CHANGED    Details   enoxaparin (LOVENOX) 80 MG/0.8ML injection Inject 0.79 mLs (79 mg) Subcutaneous every 12 hours  Qty: 47.4 mL, Refills: 0    Associated Diagnoses: History of deep venous thrombosis         CONTINUE these medications which have NOT CHANGED    Details   ACETAMINOPHEN PO Take 500-1,000 mg by mouth every 8 hours as needed for pain      omeprazole (PRILOSEC) 20 MG CR capsule Take 20 mg by mouth every morning Reported on 3/15/2017      lisinopril (PRINIVIL/ZESTRIL) 5 MG tablet Take 5 mg by mouth every evening Reported on 3/15/2017                  Day of Discharge Physical Exam:   Temp:  [97.3  F (36.3  C)-98.3  F (36.8  C)] 98.3  F  (36.8  C)  Heart Rate:  [91-96] 96  Resp:  [16] 16  BP: (125-132)/(67-75) 126/72  SpO2:  [93 %-97 %] 94 %  General: AAOx4, NAD, lying comfortably in bed  HEENT: L neck wound with scant serous drainage.  CV/Pulm: RRR, no dyspnea, NLB on RA  Abd: soft, non-distended, non-tender  Extremities: WWP  Neuro: moving all extremities spontaneously          Discharge Instructions and Follow-Up:     Home infusion referral     Home care nursing referral     Reason for your hospital stay   Underwent esophagectomy     Adult [facility] Follow-up and recommended labs and tests   Follow up with Dr. Mendez , at (location with clinic name or city) [facility] Thoracic Surgery clinic, within 2 weeks  to evaluate after surgery. No follow up labs or test are needed.    Appointments on [place] and/or [facility] (with [facility] or [facility] provider or service). Call [phone] if you haven't heard regarding these appointments within 7 days of discharge.     Activity   Your activity upon discharge: activity as tolerated     Discharge Instructions   THORACIC SURGERY DISCHARGE INSTRUCTIONS    DIET: Nothing by mouth except for ice chips, up to 1 cup every 8 hours.    If your plans upon discharge include prolonged periods of sitting (i.e a lengthy car or plane ride), it is highly beneficial to get up and walk at least once per hour to help prevent swelling and blood clots.     You may get incision wet. Do not submerge, soak, or scrub incision or swim until seen in follow-up.    Take incentive spirometer home for continued frequent use    Activity as tolerated, no strenous activity until seen in follow-up, no lifting greater than 20 pounds for the next 4 weeks.    Stay hydrated. Take over the counter fiber (metamucil or benefiber) and stool softeners (Miralax, docusate or senna) if becoming constipated.     Call for fever greater than 101.5, chills, increased size of incision, red skin around incision, vision changes, muscle strength changes, sensation  changes, shortness of breath, or other concerns.    No driving while taking narcotic pain medication.    Transition to ibuprofen or tylenol/acetaminophen for pain control. Do not take tylenol/acetaminophen and acetaminophen containing narcotic (e.g., percocet or vicodin) at the same time. If you have known ulcer problems, or kidney trouble (elevated creatinine) do not take the ibuprofen.    In emergencies, call 911    For other Questions or Concerns;  A.) During weekday working hours (Monday through Friday 8am to 4:30pm)  call [phone] and ask to speak to a clinical nurse specialist.    B.) At nights (after 4:30pm), on weekends, or if urgent call [phone] and  tell the   I would like to page job code [phone], the thoracic surgery  fellow on call, please.      Wound care and dressings   Instructions to care for your wound at home: as directed.    Gauze dampened with saline packed in neck wound, to be changed once daily and as needed. Right chest tube site to have Tegaderm with gauze pad on wound, keep in place but change if drainage leaks through dressing.     Diet   Follow this diet upon discharge:      Adult Formula Drip Feeding: Specified Time Isosource 1.5; Jejunostomy; Goal Rate: 110; mL/hr; From: 4:00 PM; 8:00 AM; Medication - Tube Feeding Flush Frequency: At least 15-30 mL water before and after medication administration and with tube clogging    Nothing by mouth except for ice chips. OK to have up to 1 cup of ice chips every 8 hours until follow-up appointment with Thoracic Surgery.             Home Health Care:   Arranged   Tube feeds            Discharge Disposition:   Discharged to home      Condition at discharge: Stable      Patient discussed with staff on day of discharge.      Corby Palumbo MD  PGY-1 General Surgery  [facility]

## 2017-05-01 NOTE — PLAN OF CARE
Problem: Goal Outcome Summary  Goal: Goal Outcome Summary  Vitals:     04/30/17 0729 04/30/17 1143 04/30/17 1610 04/30/17 1944   BP: 130/71 123/78 126/72 132/75   BP Location: Right arm Right arm Right arm Right arm   Cuff Size:           Pulse:           Resp: 16 16 16 16   Temp: 97.6  F (36.4  C) 97.6  F (36.4  C) 97.3  F (36.3  C) 97.8  F (36.6  C)   TempSrc: Oral Oral Oral Oral   SpO2: 96% 97% 97% 93%   Weight:           Height:             AO x4, VSS. Chest tube removal site dressing changed x3, moderate amt of serous drainage. Pt tolerating tube feeding via J-tube as ordered, started @1630. PRN hydroxyzine administered for anxiety. Pharyngostomy removal site dressing CDI. BG 91, 134, no coverage given. No BM this shift, void spontaneously. Up independently, walked the montanez x2 this shift. Continuous LR decreased to @10mL/hr. Patient tolerating IV antibiotics per order. Continue with current POC.

## 2017-05-01 NOTE — PLAN OF CARE
Problem: Goal Outcome Summary  Goal: Goal Outcome Summary  Outcome: Improving  Vitals:     04/30/17 1610 04/30/17 1944 04/30/17 2353 05/01/17 0700   BP: 126/72 132/75 125/67 126/72   BP Location: Right arm Right arm Right arm Right arm   Cuff Size:           Pulse:           Resp: 16 16 16 16   Temp: 97.3  F (36.3  C) 97.8  F (36.6  C) 97.3  F (36.3  C) 98.3  F (36.8  C)   TempSrc: Oral Oral Oral     SpO2: 97% 93% 93% 94%   Weight:           Height:             A&Ox4. Afeb, AVSS. O2 sats 94% on RA. Denies SOB. LS clear. Pain well controlled w/ scheduled Oxy and tylenol x1. Up ad fabi. Ambulating in hallways. Voiding and not saving. + BS, + flatus, reports BMs today. NPO. Denies N/V. TF off at 0800. J tube currently clamped. Abdominal lap sites w/ dermabond, BINDU, CDI. L neck wound covered, dressing changed in AM per MD. R CT dressing site leaking, changed x1.  and 106. Pt self-administered Lovenox. IV ABX given per orders. Oral ABX ordered and administered. Per MD pt is adequate for discharge. Discharge instructions and meds reviewed w/ pt. Questions and concerns were addressed. MD paged to order liquid Tylenol for pt to go home with per pt request. Care coordinator working w/ pt to receive TF supplies at 1600 and nurse to hook pt up before pt leaves. Per pt he received teaching w/ family in [place] and is comfortable with TF and dressing changes. Pt would like wife to change neck and R CT dressing before pt leaves since wife has not changed them. Will let oncoming nurse know. No further questions/concerns.

## 2017-05-01 NOTE — DISCHARGE INSTRUCTIONS
I have fax'd d/c orders to [facility] and [facility] 5/1/17 Jazmyn BYERS

## 2017-05-02 ENCOUNTER — CARE COORDINATION (OUTPATIENT)
Dept: CARE COORDINATION | Facility: CLINIC | Age: 57
End: 2017-05-02

## 2017-05-02 NOTE — PROGRESS NOTES
I called patient in response to message sent to me from Kely Ramos RN who had made a discharge call to patient.   I asked him how I could help with his questions/concerns.       He said he thinks all of the questions were answered earlier today after talking to a nurse.   He said he actually does have medication for his reflux, and the only thing not arranged was a f/u appt.   His discharge summary says he should see Dr. Mendez in 2 weeks, no imaging needed.    Will arrange for him to see Dr. Mendez 5/17 @ 7:30am and he and his wife agreed with this plan.

## 2017-05-02 NOTE — PROGRESS NOTES
"[facility]  \"Hello, my name is Kely Ramos , and I am calling from the [facility].  I want to check in and see how you are doing, after leaving the hospital.  You may also receive a call from your Care Coordinator (care team), but I want to make sure you don t have any urgent needs.  I have a couple questions to review with you:     Post-Discharge Outreach                                                    Jim Braden is a 56 year old male     Follow-up Appointments           Adult [facility] Follow-up and recommended labs and tests       Follow up with Dr. Mendez , at (location with clinic name or city) [facility] Thoracic Surgery clinic, within 2 weeks to evaluate after surgery. No follow up labs or test are needed.                Care Team:    Patient Care Team       Relationship Specialty Notifications Start End    Edith Dan MD PCP - General Hematology  1/10/17     Comment:  referring to Dr Mendez    Phone: [phone] Fax: [phone]         [address] [address]    Jeronimo Mendez MD MD Thoracic Diseases  1/10/17     Phone: [phone] Fax: [phone]          [facility] [address] [facility] [address] [facility] [address]            Transition of Care Review                                                      Did you have a surgery or procedure during your hospital visit? Yes   If yes, do you have any of the following:     Signs of infection:  No    Pain:  Yes     Pain Scale (0-10) 2/10     Location: Back pain    Wound/incision concerns? No    Do you have all of your medications/refills?  Yes    Are you having any side effects or questions about your medication(s)? Yes- patient had lots of reflux last night, he is wondering what antacid he can take?    Do you have any new or worsening symptoms?  Yes- back pain from chest wrap, lots of GERD    Do you have any future appointments scheduled?   " "No        ----------------> Patient also has questions on how much feeding should he do (amount) and where can he get more \"special wraps\" for his chest cavity?             Plan                                                      Thanks for your time.  Your Care Coordinator may follow-up within the next couple days.  In the meantime if you have questions, concerns or problems call your care team.        Kely Ramos    "

## 2017-05-03 NOTE — PLAN OF CARE
Problem: Goal Outcome Summary  Goal: Goal Outcome Summary  Speech Language Therapy Discharge Summary     Reason for therapy discharge:    Discharged to home.     Progress towards therapy goal(s). See goals on Care Plan in [facility] electronic health record for goal details.  Goals not met.  Barriers to achieving goals:   not yet medically appropriate for PO intake.     Therapy recommendation(s):    likely no ongoing ST intervention needed   Ok to initiate clear liquid diet from oral-pharyngeal swallow perspective. Will defer to medical team for initiation of PO diet from surgical standpoint.

## 2017-05-04 DIAGNOSIS — G89.18 POST-OPERATIVE PAIN: Primary | ICD-10-CM

## 2017-05-04 DIAGNOSIS — C15.9 MALIGNANT NEOPLASM OF ESOPHAGUS, UNSPECIFIED LOCATION (H): Primary | ICD-10-CM

## 2017-05-04 DIAGNOSIS — R13.10 DYSPHAGIA, UNSPECIFIED TYPE: ICD-10-CM

## 2017-05-04 RX ORDER — OXYCODONE HCL 5 MG/5 ML
5 SOLUTION, ORAL ORAL EVERY 4 HOURS PRN
Qty: 150 ML | Refills: 0 | Status: SHIPPED | OUTPATIENT
Start: 2017-05-04

## 2017-05-05 ENCOUNTER — TELEPHONE (OUTPATIENT)
Dept: SURGERY | Facility: CLINIC | Age: 57
End: 2017-05-05

## 2017-05-05 DIAGNOSIS — F41.9 ANXIETY: Primary | ICD-10-CM

## 2017-05-05 NOTE — TELEPHONE ENCOUNTER
Call from Abril asking for something for Jim to take for anxiety as this has increased of late. He is scheduled to see Dr. Mendez on 5/17 and is waiting to hear from the schedulers about a video swallow and speech path referral to be done prior to this appointment. I will mail him a prescription.    Abril is fine with this plan.

## 2017-05-16 NOTE — PROGRESS NOTES
THORACIC SURGERY FOLLOW UP VISIT      Dear Dr. Dan,   I saw Mr. Braden in follow-up today. 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 Right thoracoscopic esophageal mobilization with mediastinal lymphadenectomy.   2. Thoracic duct ligation and excision.   3. Laparoscopic transhiatal esophagectomy.   4. Laparoscopic jejunostomy feeding tube placement.   5. Left tube thoracostomy.   6. Esophagogastroscopy.   7. Pharyngostomy tube placement.   8. Flexible bronchoscopy.     4/24/17:   1. Esophagogastroscopy.   2. Fluoroscopy with interpretation of images.   3. Incision and drainage of left neck wound.     4/28/17:  EGD and neck wound exploration and washout.     COMPLICATIONS POST PROCEDURE  Anastomotic leak  Vocal cord palsy     HISTOPATHOLOGY   4/14/2017: Stage IIIC, [id number] esophageal adenocarcinoma, 9/20 + nodes  12/12/2016: EGD esophageal biopsy with poorly differentiated adenocarcinoma  12/20/2016: EUS FNA left gastric LN positive for esophageal adenocarcinoma, uT3N3      INTERVAL STUDIES  None      ETOH 1 beer daily preop  TOB former smoker, quit 2007  BMI 24      PMH  Poorly differentiated esophageal adenocarcinoma  Left distal DVT  GERD  HTN  Intermittent chronic back pain  History of open appendectomy      BARBARA Braden is slowly regaining his strength, and his neck wound is healing well. He is still hoarse.     From a personal perspective, he is here with his wife, Abril. He works as a . He lives locally with his wife and his children.      IMPRESSION (C15.5) Malignant neoplasm of lower third of esophagus (H)  (primary encounter diagnosis)  (Z90.49) H/O esophagectomy       56  year-old male with 1 month s/p resection of a Stage IIIc esophageal adenocarcinoma.      PLAN  I reviewed the plan as follows:  1) Start clear liquids  2) ENT visit for vocal cord injection  3) Follow-up in 4-6 weeks, depending on how he is progressing with PO intake and TF weaning. The thoracic surgery CNS will  him over the phone.    They had all their questions answered and were in agreement with the plan.  I appreciate the opportunity to participate in the care of your patient and will keep you updated.  Sincerely,

## 2017-05-17 ENCOUNTER — OFFICE VISIT (OUTPATIENT)
Dept: SURGERY | Facility: CLINIC | Age: 57
End: 2017-05-17
Attending: SPECIALIST
Payer: COMMERCIAL

## 2017-05-17 VITALS
WEIGHT: 172.1 LBS | TEMPERATURE: 98.5 F | DIASTOLIC BLOOD PRESSURE: 81 MMHG | BODY MASS INDEX: 24.09 KG/M2 | HEIGHT: 71 IN | SYSTOLIC BLOOD PRESSURE: 126 MMHG | OXYGEN SATURATION: 97 % | HEART RATE: 89 BPM | RESPIRATION RATE: 18 BRPM

## 2017-05-17 DIAGNOSIS — C15.9 MALIGNANT NEOPLASM OF ESOPHAGUS, UNSPECIFIED LOCATION (H): Primary | ICD-10-CM

## 2017-05-17 DIAGNOSIS — C15.5 MALIGNANT NEOPLASM OF LOWER THIRD OF ESOPHAGUS (H): Primary | ICD-10-CM

## 2017-05-17 DIAGNOSIS — Z90.49 H/O ESOPHAGECTOMY: ICD-10-CM

## 2017-05-17 DIAGNOSIS — J38.00 VOCAL CORD PARALYSIS: Primary | ICD-10-CM

## 2017-05-17 DIAGNOSIS — K91.89: ICD-10-CM

## 2017-05-17 DIAGNOSIS — Z98.890 H/O ESOPHAGECTOMY: ICD-10-CM

## 2017-05-17 PROCEDURE — 99212 OFFICE O/P EST SF 10 MIN: CPT | Mod: ZF

## 2017-05-17 ASSESSMENT — PAIN SCALES - GENERAL: PAINLEVEL: NO PAIN (0)

## 2017-05-17 NOTE — NURSING NOTE
"Oncology Rooming Note    May 17, 2017 7:41 AM   Jim Braden is a 56 year old male who presents for:    Chief Complaint   Patient presents with     Oncology Clinic Visit     Post Op     Initial Vitals: /81  Pulse 89  Temp 98.5  F (36.9  C) (Oral)  Resp 18  Ht 1.803 m (5' 10.98\")  Wt 78.1 kg (172 lb 1.6 oz)  SpO2 97%  BMI 24.01 kg/m2 Estimated body mass index is 24.01 kg/(m^2) as calculated from the following:    Height as of this encounter: 1.803 m (5' 10.98\").    Weight as of this encounter: 78.1 kg (172 lb 1.6 oz). Body surface area is 1.98 meters squared.  No Pain (0) Comment: Data Unavailable   No LMP for male patient.  Allergies reviewed: Yes  Medications reviewed: Yes    Medications: Medication refills not needed today.  Pharmacy name entered into EPIC: Data Unavailable    Clinical concerns: None Dr Mendez was NOT notified.    7 minutes for nursing intake (face to face time)     Serenity Tabor LPN              "

## 2017-05-17 NOTE — MR AVS SNAPSHOT
"              After Visit Summary   5/17/2017    Jim Braden    MRN: [id number]           Patient Information     Date Of Birth          1960        Visit Information        Provider Department      5/17/2017 7:30 AM Jeronimo Mendez MD [facility]        Today's Diagnoses     Malignant neoplasm of lower third of esophagus (H)    -  1    H/O esophagectomy           Follow-ups after your visit        Your next 10 appointments already scheduled     Jul 13, 2017  2:00 PM CDT   (Arrive by 1:45 PM)   New Patient Visit with Lawrence Saunders MD   [facility] ([facility] [place])    [address]  [address] [facility] [address] [phone]              Who to contact     If you have questions or need follow up information about today's clinic visit or your schedule please contact [facility] directly at [phone].  Normal or non-critical lab and imaging results will be communicated to you by [employer], letter or phone within 4 business days after the clinic has received the results. If you do not hear from us within 7 days, please contact the clinic through [employer] or phone. If you have a critical or abnormal lab result, we will notify you by phone as soon as possible.  Submit refill requests through [employer] or call your pharmacy and they will forward the refill request to us. Please allow 3 business days for your refill to be completed.          Additional Information About Your Visit        [employer] Information     [employer] lets you send messages to your doctor, view your test results, renew your prescriptions, schedule appointments and more. To sign up, go to [email or web address] . Click on \"Log in\" on the left side of the screen, which will take you to the Welcome page. Then click on \"Sign up Now\" on the right side of the page.     You will be asked to enter the access code listed below, as well as some " "personal information. Please follow the directions to create your username and password.     Your access code is: [email or web address]  Expires: 2017 11:30 AM     Your access code will  in 90 days. If you need help or a new code, please call your [facility] or [phone].        Care EveryWhere ID     This is your Care EveryWhere ID. This could be used by other organizations to access your [facility] medical records  [id number]        Your Vitals Were     Pulse Temperature Respirations Height Pulse Oximetry BMI (Body Mass Index)    89 98.5  F (36.9  C) (Oral) 18 1.803 m (5' 10.98\") 97% 24.01 kg/m2       Blood Pressure from Last 3 Encounters:   17 126/81   17 126/72   17 (!) 165/94    Weight from Last 3 Encounters:   17 78.1 kg (172 lb 1.6 oz)   17 78.7 kg (173 lb 7.6 oz)   17 83.9 kg (185 lb)              Today, you had the following     No orders found for display       Primary Care Provider Office Phone # Fax #    Edith Dan [phone]       [address] [address]        Thank you!     Thank you for choosing [facility]  for your care. Our goal is always to provide you with excellent care. Hearing back from our patients is one way we can continue to improve our services. Please take a few minutes to complete the written survey that you may receive in the mail after your visit with us. Thank you!             Your Updated Medication List - Protect others around you: Learn how to safely use, store and throw away your medicines at www.disposemymeds.org.          This list is accurate as of: 17  5:14 PM.  Always use your most recent med list.                   Brand Name Dispense Instructions for use    * ACETAMINOPHEN PO      Take 500-1,000 mg by mouth every 8 hours as needed for pain Reported on 2017       * acetaminophen 32 mg/mL solution    TYLENOL    1000 mL    20.3 mLs (650 mg) " by Per J Tube route every 4 hours as needed for other (surgical pain)       bisacodyl 10 MG Suppository    DULCOLAX    30 suppository    Place 1 suppository (10 mg) rectally daily as needed for constipation       CITALOPRAM HYDROBROMIDE PO      Take 20 mg by mouth daily       diazepam 1 MG/ML solution    VALIUM    120 mL    1 mL (1 mg) by Per Feeding Tube route every 6 hours as needed for anxiety       enoxaparin 80 MG/0.8ML injection    LOVENOX    47.4 mL    Inject 0.79 mLs (79 mg) Subcutaneous every 12 hours       lisinopril 5 MG tablet    PRINIVIL/ZESTRIL     Take 5 mg by mouth every evening Reported on 5/17/2017       LORAZEPAM PO      Take 0.5 mg by mouth every 4 hours       omeprazole 20 MG CR capsule    priLOSEC     Take 20 mg by mouth every morning Reported on 5/17/2017       ondansetron 4 MG ODT tab    ZOFRAN-ODT    120 tablet    Take 1 tablet (4 mg) by mouth every 6 hours as needed for nausea       * oxyCODONE 5 MG/5ML solution    ROXICODONE    150 mL    5 mLs (5 mg) by Per J Tube route every 4 hours as needed for moderate to severe pain       * oxyCODONE 5 MG/5ML solution    ROXICODONE    150 mL    Take 5 mLs (5 mg) by mouth every 4 hours as needed for moderate to severe pain       pantoprazole Susp suspension    PROTONIX     Take 20 mg by mouth daily Reported on 5/17/2017       phenol 1.4 % Liqd spray    CHLORASEPTIC    177 mL    Take 1 spray (1 mL) by mouth every hour as needed for sore throat       polyethylene glycol Packet    MIRALAX/GLYCOLAX    30 packet    17 g by Per Feeding Tube route daily       sennosides 8.8 MG/5ML syrup    SENOKOT    105 mL    5-10 mLs by Per J Tube route daily as needed for constipation       * Notice:  This list has 4 medication(s) that are the same as other medications prescribed for you. Read the directions carefully, and ask your doctor or other care provider to review them with you.

## 2017-05-25 ENCOUNTER — TELEPHONE (OUTPATIENT)
Dept: SURGERY | Facility: CLINIC | Age: 57
End: 2017-05-25

## 2017-05-25 NOTE — TELEPHONE ENCOUNTER
I spoke with Jim today.  He is feeling well.  He's been walking every day for 45 minutes and his appetite is improving. He started a soft diet (scrambled eggs and mashed potatoes) and is doing well with that.  He will continue with a soft diet (small, frequent meals) and will decrease his tube feedings by about 2-3 cans.  He will call again to discuss when he's ready to wean more.

## 2017-06-08 ENCOUNTER — OFFICE VISIT (OUTPATIENT)
Dept: OTOLARYNGOLOGY | Facility: CLINIC | Age: 57
End: 2017-06-08

## 2017-06-08 VITALS — HEIGHT: 70 IN | BODY MASS INDEX: 24.91 KG/M2 | WEIGHT: 174 LBS

## 2017-06-08 DIAGNOSIS — J38.00 VOCAL CORD PARALYSIS: ICD-10-CM

## 2017-06-08 DIAGNOSIS — R49.0 DYSPHONIA: Primary | ICD-10-CM

## 2017-06-08 ASSESSMENT — PAIN SCALES - GENERAL: PAINLEVEL: NO PAIN (0)

## 2017-06-08 NOTE — LETTER
6/8/2017       RE: Jim Braden  [address] [address]     Dear Colleague,    Thank you for referring your patient, Jim Braden, to the [facility] at [facility]. Please see a copy of my visit note below.    HISTORY OF PRESENT ILLNESS:  Mr. 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  He had on April 14, 2017, a laparoscopic transethmoidal esophagectomy, left tube thoracostomy and laparoscopic jejunal feeding tube placement.  On 4/24 he had esophageal gastrostomy, incision drainage of the left neck wound.      POST-PROCEDURE:  There was an anastomotic leak and a vocal fold palsy.  He is doing relatively well and is able to move around.  He does have a voice and good articulation, but does have a weak quality to his voice.  He notes that he continues to have some indigestion, heartburn and chest pain.  He is here for evaluation of his vocal folds and potential left vocal fold injection.        Last 2 Scores for Patient-Answered VHI Questionnaire  VHI Total Score 6/8/2017   VHI Total Score 21       Last 2 Scores for Patient-Answered CSI Questionnaire  CSI Total Score 6/8/2017   CSI Total Score 15         Last 2 Scores for Patient-Answered EAT Questionnaire  EAT Total Score 6/8/2017   EAT Total Score 19           PAST MEDICAL HISTORY:   Past Medical History:   Diagnosis Date     Alpha-1-antitrypsin deficiency carrier (H)      Esophageal cancer (H)      GERD (gastroesophageal reflux disease)      HTN (hypertension)      Leg DVT (deep venous thromboembolism), acute, right (H)        PAST SURGICAL HISTORY:   Past Surgical History:   Procedure Laterality Date     APPENDECTOMY       BRONCHOSCOPY FLEXIBLE N/A 4/24/2017    Procedure: BRONCHOSCOPY FLEXIBLE;;  Surgeon: Jeronimo Mendez MD;  Location: [facility] OR     COLONOSCOPY       ESOPHAGECTOMY N/A 4/14/2017    Procedure: ESOPHAGECTOMY;  Surgeon:  Jeronimo Mendez MD;  Location: [facility] OR     ESOPHAGOSCOPY, GASTROSCOPY, DUODENOSCOPY (EGD), COMBINED N/A 4/14/2017    Procedure: COMBINED ESOPHAGOSCOPY, GASTROSCOPY, DUODENOSCOPY (EGD);  Surgeon: Jeronimo Mendez MD;  Location: [facility] OR     ESOPHAGOSCOPY, GASTROSCOPY, DUODENOSCOPY (EGD), COMBINED N/A 4/24/2017    Procedure: COMBINED ESOPHAGOSCOPY, GASTROSCOPY, DUODENOSCOPY (EGD);  Esophagogastroduodenoscopy;  Surgeon: Jeronimo Mendez MD;  Location: [facility] OR     EXPLORE NECK Left 4/28/2017    Procedure: EXPLORE NECK;  Neck Wound Exploration and Washout, Esophagogastroduodenoscopy ;  Surgeon: Lucy Nobles MD;  Location: [facility] OR     INCISION AND DRAINAGE NECK, COMBINED N/A 4/24/2017    Procedure: COMBINED INCISION AND DRAINAGE NECK;  with removal of pharangostomy tube;  Surgeon: Jeronimo Mendez MD;  Location: [facility] OR     LAPAROSCOPIC ASSISTED INSERTION TUBE JEJUNOSTOMY N/A 4/14/2017    Procedure: LAPAROSCOPIC ASSISTED INSERTION TUBE JEJUNOSTOMY;  Surgeon: Jeronimo Mendez MD;  Location: [facility] OR     ORTHOPEDIC SURGERY      Left arm after fracture     THORACOSCOPIC, LAPAROSCOPIC ESOPHAGECTOMY, COMBINED N/A 4/14/2017    Procedure: COMBINED THORACOSCOPIC, LAPAROSCOPIC ESOPHAGECTOMY;  Surgeon: Jeronimo Mendez MD;  Location: [facility] OR       FAMILY HISTORY:   Family History   Problem Relation Age of Onset     Other - See Comments Mother      Alpha-1 Antitrypsin Deficiency     Dementia Father        SOCIAL HISTORY:   Social History   Substance Use Topics     Smoking status: Former Smoker     Packs/day: 1.00     Types: Cigarettes     Smokeless tobacco: Never Used      Comment: Quit 2007     Alcohol use 0.0 oz/week     0 Standard drinks or equivalent per week      Comment: 1-2 cans of beer day       REVIEW OF SYSTEMS: Ten point review of systems was performed and is negative except for: No flowsheet data found.     ALLERGIES: Review of patient's allergies indicates no known  allergies.    MEDICATIONS:   Current Outpatient Prescriptions   Medication Sig Dispense Refill     pantoprazole (PROTONIX) SUSP suspension Take 20 mg by mouth daily Reported on 5/17/2017       CITALOPRAM HYDROBROMIDE PO Take 20 mg by mouth daily       LORAZEPAM PO Take 0.5 mg by mouth every 4 hours       diazepam (VALIUM) 1 MG/ML solution 1 mL (1 mg) by Per Feeding Tube route every 6 hours as needed for anxiety 120 mL 1     oxyCODONE (ROXICODONE) 5 MG/5ML solution Take 5 mLs (5 mg) by mouth every 4 hours as needed for moderate to severe pain 150 mL 0     bisacodyl (DULCOLAX) 10 MG Suppository Place 1 suppository (10 mg) rectally daily as needed for constipation 30 suppository 0     ondansetron (ZOFRAN-ODT) 4 MG ODT tab Take 1 tablet (4 mg) by mouth every 6 hours as needed for nausea 120 tablet 0     oxyCODONE (ROXICODONE) 5 MG/5ML solution 5 mLs (5 mg) by Per J Tube route every 4 hours as needed for moderate to severe pain 150 mL 0     phenol (CHLORASEPTIC) 1.4 % LIQD spray Take 1 spray (1 mL) by mouth every hour as needed for sore throat 177 mL 0     polyethylene glycol (MIRALAX/GLYCOLAX) Packet 17 g by Per Feeding Tube route daily 30 packet 0     sennosides (SENOKOT) 1.76 mg/mL syrup 5-10 mLs by Per J Tube route daily as needed for constipation 105 mL 0     acetaminophen (TYLENOL) 32 mg/mL solution 20.3 mLs (650 mg) by Per J Tube route every 4 hours as needed for other (surgical pain) 1000 mL 0     ACETAMINOPHEN PO Take 500-1,000 mg by mouth every 8 hours as needed for pain Reported on 5/17/2017       omeprazole (PRILOSEC) 20 MG CR capsule Take 20 mg by mouth every morning Reported on 5/17/2017       lisinopril (PRINIVIL/ZESTRIL) 5 MG tablet Take 5 mg by mouth every evening Reported on 5/17/2017           PHYSICAL EXAMINATION:  He  is awake, alert and in no apparent distress.    His tympanic membranes are clear and intact bilaterally. External auditory canals are clear.  Nasal exam shows a mild septal deviation  without obstruction.  Examination of the oral cavity shows no suspicious lesions.  There is symmetric movement of the tongue and soft palate.    The oropharynx is clear.  His neck is supple without significant adenopathy.  Pulse is regular.  Upper airway is clear.  Cranial nerves II-XII are grossly intact.       PROCEDURE:  Fiberoptic laryngoscopy was performed by our speech language pathologist.  This showed a left vocal fold paralysis with good abduction of the right vocal fold.  No nodules, polyps or ulcerations are seen.  The hypopharynx is clear.     IMPRESSION:  Left vocal fold paralysis dating back to his surgery in mid April of 2017.  His voice is bothersome to him as he works as a  and he is to talk over background noise.  After a thorough discussion, he decided that he was interested in a left vocal fold injection with Cymetra.    PROCEDURE NOTE: After obtaining informed consent, a topical solution of 3% lidocaine and 0.25% phenylephrine was sprayed into each nostril with time allowed to take effect. A fiberoptic scope was passed through the nose and the larynx was examined. This showed a left sided vocal fold immobility with good motion of the opposite vocal fold. The immediate subglottic area is clear. The fiberoptic scope was removed. After local preparation with an alcohol swab, 1% lidocaine with 1:100,000 epinephrine was injected into the area of the cricothyroid membrane and the left sidethyroid ala. This was given approximately five minutes to take effect. During this time, the Cymetra was reconstituted with saline and  prepared for injection. After this process was completed, a fiberoptic scope was then placed through the nostril, visualizing the larynx on a video monitor. A 23-gauge needle was passed through the cricothyroid membrane into the left sidevocal fold. Approximately 0.6 cc of Cymetra was injected into the vocal fold. Good medialization with a strong voice and cough post  injection. Following the injection, the needle was removed. The fiberoptic scope showed that there was an adequate airway and a convex character to the injected vocal fold where it had been concave previously. The scope was removed, and the procedure was completed. The patient tolerated the procedure well and left our clinic after a short observation.     Preinjection      Post injection            PLAN:  I will have him n.p.o. for the next hour and a half and then back to his preprocedure diet. He does have some codeine at home for pain.  I will have him follow back up in approximately three months' time.      KENTRELL/tabby         Again, thank you for allowing me to participate in the care of your patient.      Sincerely,    Lawrence Saunders MD

## 2017-06-08 NOTE — MR AVS SNAPSHOT
After Visit Summary   2017    Jim Braden    MRN: [id number]           Patient Information     Date Of Birth          1960        Visit Information        Provider Department      2017 12:30 PM Lawrence Saunders MD;  ENT PROCEDURE ROOM [facility] Th[facility]        Today's Diagnoses     Dysphonia    -  1    Vocal cord paralysis          Care Instructions    Plan of care:  Today you have had an injection of collagen (Cymetra) into your vocal cords.     Guidelines:  1. Nothing to eat or drink for one hour after you are discharged.  2. Start with a small sip of water and if that goes ok, you can eat and drink normally.  3. Your voice will probably sound a little tight or rough for the first day or two, and then become clearer and freer sounding as the absorption takes place.  You re also likely to notice the lack of the  leaky  feeling.    4. The positive effects from the collagen/Cymetra usually lasts 2-3 months  5. If you would like to have another injection, call the clinic and ask to speak to Pauline, the RN who works with Dr Saunders. The injections are usually done on Thursday's, between -.  6. If you are interested in discussing surgery (as Dr Saunders explained) or scheduling a surgery you can schedule a regular return visit.    Clinic contact information:  1. To schedule an appointment call [phone], option 1  2. To talk to the Triage RN call [phone], option 3  3. If you need to speak to Pauline or get a message to your doctor on a Friday, call the triage RN  4. PaulineRN: [phone]  5. Surgery scheduling:      Fiona Nobles: [phone]      Yesenia Sam: [phone]  6. Fax: [phone]  7. [phone]            Follow-ups after your visit        Your next 10 appointments already scheduled     Sep 12, 2017  9:45 AM CDT   (Arrive by 9:30 AM)   Return Visit with Lawrence Saunders MD   [facility] ([facility]  [facility])    [address]  [address] [address] [phone]              Who to contact     Please call your clinic at [phone] to:    Ask questions about your health    Make or cancel appointments    Discuss your medicines    Learn about your test results    Speak to your doctor   If you have compliments or concerns about an experience at your clinic, or if you wish to file a complaint, please contact [facility] Patient Relations at [phone] or email us at [email or web address]         Additional Information About Your Visit        [employer] Health Information     L[employer] is an electronic gateway that provides easy, online access to your medical records. With LÃ[employer], you can request a clinic appointment, read your test results, renew a prescription or communicate with your care team.     To sign up for [employer] visit the website at [email or web address]   You will be asked to enter the access code listed below, as well as some personal information. Please follow the directions to create your username and password.     Your access code is: [email or web address]  Expires: 2017 11:30 AM     Your access code will  in 90 days. If you need help or a new code, please contact your [facility] or call [phone] for assistance.        Care EveryWhere ID     This is your Care EveryWhere ID. This could be used by other organizations to access your [place] medical records  [id number]        Your Vitals Were     Height BMI (Body Mass Index)                1.78 m (5' 10.08\") 24.91 kg/m2           Blood Pressure from Last 3 Encounters:   17 126/81   17 126/72   17 (!) 165/94    Weight from Last 3 Encounters:   17 78.9 kg (174 lb)   17 78.1 kg (172 lb 1.6 oz)   17 78.7 kg (173 lb 7.6 oz)              We Performed the Following     IMAGESTREAM RECORDING ORDER        Primary Care " Provider Office Phone # Fax #    Edith Dna [phone]       [place] [address] [facility] [address]        Thank you!     Thank you for choosing [facility] TH[facility]  for your care. Our goal is always to provide you with excellent care. Hearing back from our patients is one way we can continue to improve our services. Please take a few minutes to complete the written survey that you may receive in the mail after your visit with us. Thank you!             Your Updated Medication List - Protect others around you: Learn how to safely use, store and throw away your medicines at www.disposemymeds.org.          This list is accurate as of: 6/8/17  1:40 PM.  Always use your most recent med list.                   Brand Name Dispense Instructions for use    * ACETAMINOPHEN PO      Take 500-1,000 mg by mouth every 8 hours as needed for pain Reported on 5/17/2017       * acetaminophen 32 mg/mL solution    TYLENOL    1000 mL    20.3 mLs (650 mg) by Per J Tube route every 4 hours as needed for other (surgical pain)       bisacodyl 10 MG Suppository    DULCOLAX    30 suppository    Place 1 suppository (10 mg) rectally daily as needed for constipation       CITALOPRAM HYDROBROMIDE PO      Take 20 mg by mouth daily       diazepam 1 MG/ML solution    VALIUM    120 mL    1 mL (1 mg) by Per Feeding Tube route every 6 hours as needed for anxiety       lisinopril 5 MG tablet    PRINIVIL/ZESTRIL     Take 5 mg by mouth every evening Reported on 5/17/2017       LORAZEPAM PO      Take 0.5 mg by mouth every 4 hours       omeprazole 20 MG CR capsule    priLOSEC     Take 20 mg by mouth every morning Reported on 5/17/2017       ondansetron 4 MG ODT tab    ZOFRAN-ODT    120 tablet    Take 1 tablet (4 mg) by mouth every 6 hours as needed for nausea       * oxyCODONE 5 MG/5ML solution    ROXICODONE    150 mL    5 mLs (5 mg) by Per J Tube route every 4 hours as needed for moderate to severe  pain       * oxyCODONE 5 MG/5ML solution    ROXICODONE    150 mL    Take 5 mLs (5 mg) by mouth every 4 hours as needed for moderate to severe pain       pantoprazole Susp suspension    PROTONIX     Take 20 mg by mouth daily Reported on 5/17/2017       phenol 1.4 % Liqd spray    CHLORASEPTIC    177 mL    Take 1 spray (1 mL) by mouth every hour as needed for sore throat       polyethylene glycol Packet    MIRALAX/GLYCOLAX    30 packet    17 g by Per Feeding Tube route daily       sennosides 8.8 MG/5ML syrup    SENOKOT    105 mL    5-10 mLs by Per J Tube route daily as needed for constipation       * Notice:  This list has 4 medication(s) that are the same as other medications prescribed for you. Read the directions carefully, and ask your doctor or other care provider to review them with you.

## 2017-06-08 NOTE — NURSING NOTE
Chief Complaint   Patient presents with     Procedure     Possible cymetra injection     Kirit Mason LPN

## 2017-06-08 NOTE — PROGRESS NOTES
HISTORY OF PRESENT ILLNESS:  Mr. 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  He had on April 14, 2017, a laparoscopic transethmoidal esophagectomy, left tube thoracostomy and laparoscopic jejunal feeding tube placement.  On 4/24 he had esophageal gastrostomy, incision drainage of the left neck wound.      POST-PROCEDURE:  There was an anastomotic leak and a vocal fold palsy.  He is doing relatively well and is able to move around.  He does have a voice and good articulation, but does have a weak quality to his voice.  He notes that he continues to have some indigestion, heartburn and chest pain.  He is here for evaluation of his vocal folds and potential left vocal fold injection.        Last 2 Scores for Patient-Answered VHI Questionnaire  VHI Total Score 6/8/2017   VHI Total Score 21       Last 2 Scores for Patient-Answered CSI Questionnaire  CSI Total Score 6/8/2017   CSI Total Score 15         Last 2 Scores for Patient-Answered EAT Questionnaire  EAT Total Score 6/8/2017   EAT Total Score 19           PAST MEDICAL HISTORY:   Past Medical History:   Diagnosis Date     Alpha-1-antitrypsin deficiency carrier (H)      Esophageal cancer (H)      GERD (gastroesophageal reflux disease)      HTN (hypertension)      Leg DVT (deep venous thromboembolism), acute, right (H)        PAST SURGICAL HISTORY:   Past Surgical History:   Procedure Laterality Date     APPENDECTOMY       BRONCHOSCOPY FLEXIBLE N/A 4/24/2017    Procedure: BRONCHOSCOPY FLEXIBLE;;  Surgeon: Jeronimo Mendez MD;  Location: [facility] OR     COLONOSCOPY       ESOPHAGECTOMY N/A 4/14/2017    Procedure: ESOPHAGECTOMY;  Surgeon: Jeronimo Mendez MD;  Location: [facility] OR     ESOPHAGOSCOPY, GASTROSCOPY, DUODENOSCOPY (EGD), COMBINED N/A 4/14/2017    Procedure: COMBINED ESOPHAGOSCOPY, GASTROSCOPY, DUODENOSCOPY (EGD);  Surgeon: Jeronimo Mendez MD;  Location: [facility] OR     ESOPHAGOSCOPY,  GASTROSCOPY, DUODENOSCOPY (EGD), COMBINED N/A 4/24/2017    Procedure: COMBINED ESOPHAGOSCOPY, GASTROSCOPY, DUODENOSCOPY (EGD);  Esophagogastroduodenoscopy;  Surgeon: Jeronimo Mendez MD;  Location: [facility] OR     EXPLORE NECK Left 4/28/2017    Procedure: EXPLORE NECK;  Neck Wound Exploration and Washout, Esophagogastroduodenoscopy ;  Surgeon: Lucy Nobles MD;  Location: [facility] OR     INCISION AND DRAINAGE NECK, COMBINED N/A 4/24/2017    Procedure: COMBINED INCISION AND DRAINAGE NECK;  with removal of pharangostomy tube;  Surgeon: Jeronimo Mendez MD;  Location: [facility] OR     LAPAROSCOPIC ASSISTED INSERTION TUBE JEJUNOSTOMY N/A 4/14/2017    Procedure: LAPAROSCOPIC ASSISTED INSERTION TUBE JEJUNOSTOMY;  Surgeon: Jeronimo Mendez MD;  Location: [facility] OR     ORTHOPEDIC SURGERY      Left arm after fracture     THORACOSCOPIC, LAPAROSCOPIC ESOPHAGECTOMY, COMBINED N/A 4/14/2017    Procedure: COMBINED THORACOSCOPIC, LAPAROSCOPIC ESOPHAGECTOMY;  Surgeon: Jeronimo Mendez MD;  Location: [facility] OR       FAMILY HISTORY:   Family History   Problem Relation Age of Onset     Other - See Comments Mother      Alpha-1 Antitrypsin Deficiency     Dementia Father        SOCIAL HISTORY:   Social History   Substance Use Topics     Smoking status: Former Smoker     Packs/day: 1.00     Types: Cigarettes     Smokeless tobacco: Never Used      Comment: Quit 2007     Alcohol use 0.0 oz/week     0 Standard drinks or equivalent per week      Comment: 1-2 cans of beer day       REVIEW OF SYSTEMS: Ten point review of systems was performed and is negative except for: No flowsheet data found.     ALLERGIES: Review of patient's allergies indicates no known allergies.    MEDICATIONS:   Current Outpatient Prescriptions   Medication Sig Dispense Refill     pantoprazole (PROTONIX) SUSP suspension Take 20 mg by mouth daily Reported on 5/17/2017       CITALOPRAM HYDROBROMIDE PO Take 20 mg by mouth daily       LORAZEPAM PO Take 0.5 mg by  mouth every 4 hours       diazepam (VALIUM) 1 MG/ML solution 1 mL (1 mg) by Per Feeding Tube route every 6 hours as needed for anxiety 120 mL 1     oxyCODONE (ROXICODONE) 5 MG/5ML solution Take 5 mLs (5 mg) by mouth every 4 hours as needed for moderate to severe pain 150 mL 0     bisacodyl (DULCOLAX) 10 MG Suppository Place 1 suppository (10 mg) rectally daily as needed for constipation 30 suppository 0     ondansetron (ZOFRAN-ODT) 4 MG ODT tab Take 1 tablet (4 mg) by mouth every 6 hours as needed for nausea 120 tablet 0     oxyCODONE (ROXICODONE) 5 MG/5ML solution 5 mLs (5 mg) by Per J Tube route every 4 hours as needed for moderate to severe pain 150 mL 0     phenol (CHLORASEPTIC) 1.4 % LIQD spray Take 1 spray (1 mL) by mouth every hour as needed for sore throat 177 mL 0     polyethylene glycol (MIRALAX/GLYCOLAX) Packet 17 g by Per Feeding Tube route daily 30 packet 0     sennosides (SENOKOT) 1.76 mg/mL syrup 5-10 mLs by Per J Tube route daily as needed for constipation 105 mL 0     acetaminophen (TYLENOL) 32 mg/mL solution 20.3 mLs (650 mg) by Per J Tube route every 4 hours as needed for other (surgical pain) 1000 mL 0     ACETAMINOPHEN PO Take 500-1,000 mg by mouth every 8 hours as needed for pain Reported on 5/17/2017       omeprazole (PRILOSEC) 20 MG CR capsule Take 20 mg by mouth every morning Reported on 5/17/2017       lisinopril (PRINIVIL/ZESTRIL) 5 MG tablet Take 5 mg by mouth every evening Reported on 5/17/2017           PHYSICAL EXAMINATION:  He  is awake, alert and in no apparent distress.    His tympanic membranes are clear and intact bilaterally. External auditory canals are clear.  Nasal exam shows a mild septal deviation without obstruction.  Examination of the oral cavity shows no suspicious lesions.  There is symmetric movement of the tongue and soft palate.    The oropharynx is clear.  His neck is supple without significant adenopathy.  Pulse is regular.  Upper airway is clear.  Cranial nerves  II-XII are grossly intact.       PROCEDURE:  Fiberoptic laryngoscopy was performed by our speech language pathologist.  This showed a left vocal fold paralysis with good abduction of the right vocal fold.  No nodules, polyps or ulcerations are seen.  The hypopharynx is clear.     IMPRESSION:  Left vocal fold paralysis dating back to his surgery in mid April of 2017.  His voice is bothersome to him as he works as a  and he is to talk over background noise.  After a thorough discussion, he decided that he was interested in a left vocal fold injection with Cymetra.    PROCEDURE NOTE: After obtaining informed consent, a topical solution of 3% lidocaine and 0.25% phenylephrine was sprayed into each nostril with time allowed to take effect. A fiberoptic scope was passed through the nose and the larynx was examined. This showed a left sided vocal fold immobility with good motion of the opposite vocal fold. The immediate subglottic area is clear. The fiberoptic scope was removed. After local preparation with an alcohol swab, 1% lidocaine with 1:100,000 epinephrine was injected into the area of the cricothyroid membrane and the left sidethyroid ala. This was given approximately five minutes to take effect. During this time, the Cymetra was reconstituted with saline and  prepared for injection. After this process was completed, a fiberoptic scope was then placed through the nostril, visualizing the larynx on a video monitor. A 23-gauge needle was passed through the cricothyroid membrane into the left sidevocal fold. Approximately 0.6 cc of Cymetra was injected into the vocal fold. Good medialization with a strong voice and cough post injection. Following the injection, the needle was removed. The fiberoptic scope showed that there was an adequate airway and a convex character to the injected vocal fold where it had been concave previously. The scope was removed, and the procedure was completed. The patient tolerated  the procedure well and left our clinic after a short observation.     Preinjection      Post injection            PLAN:  I will have him n.p.o. for the next hour and a half and then back to his preprocedure diet. He does have some codeine at home for pain.  I will have him follow back up in approximately three months' time.      KENTRELL/tabby

## 2017-06-08 NOTE — PATIENT INSTRUCTIONS
Plan of care:  Today you have had an injection of collagen (Cymetra) into your vocal cords.     Guidelines:  1. Nothing to eat or drink for one hour after you are discharged.  2. Start with a small sip of water and if that goes ok, you can eat and drink normally.  3. Your voice will probably sound a little tight or rough for the first day or two, and then become clearer and freer sounding as the absorption takes place.  You re also likely to notice the lack of the  leaky  feeling.    4. The positive effects from the collagen/Cymetra usually lasts 2-3 months  5. If you would like to have another injection, call the clinic and ask to speak to Pauline, the RN who works with Dr Saunders. The injections are usually done on Thursday's, between -.  6. If you are interested in discussing surgery (as Dr Saunders explained) or scheduling a surgery you can schedule a regular return visit.    Clinic contact information:  1. To schedule an appointment call [phone], option 1  2. To talk to the Triage RN call [phone], option 3  3. If you need to speak to Pauline or get a message to your doctor on a Friday, call the triage RN  4. PaulineRN: [phone]  5. Surgery scheduling:      Fiona Nobles: [phone]      Yesenia Sam: [phone]  6. Fax: [phone]  7. [phone]

## 2017-06-29 ENCOUNTER — TELEPHONE (OUTPATIENT)
Dept: SURGERY | Facility: CLINIC | Age: 57
End: 2017-06-29

## 2017-06-29 NOTE — TELEPHONE ENCOUNTER
"Call from Jim stating he is maintaining his weight with \"about 4 cans of tube feedings\" and by mouth. He would like to move toward getting the j-tube out. He will decrease to 3 cans of tube feeds a day for a week and then down to 2 cans of tube feeds a day. He will replace the calories orally. He will monitor his weight and will call me back with an update in a couple weeks to see if he can continue to decrease the tube feedings. Before we can consider removing the j-tube we need to make sure he can maintain his nutritional status by mouth. Jim understands this and will call me back with an update in a few weeks.  "

## 2017-07-21 DIAGNOSIS — C15.9 MALIGNANT NEOPLASM OF ESOPHAGUS, UNSPECIFIED LOCATION (H): Primary | ICD-10-CM

## 2017-07-21 NOTE — PROGRESS NOTES
"Jim reports the balloon \"has popped\" from his jejunostomy tube. He has it secured in place but needs to have it replaced. Orders placed.  "

## 2017-07-25 ENCOUNTER — TELEPHONE (OUTPATIENT)
Dept: SURGERY | Facility: CLINIC | Age: 57
End: 2017-07-25

## 2017-07-25 NOTE — TELEPHONE ENCOUNTER
Call from Jim reporting his j-tube was reinflated in the ER and is staying in place and working fine. He is calling to cancel his j-tube replacement in IR that is scheduled for this week. His plan is to stay on tube feedings through chemo. He will call if he has any future questions or concerns.

## 2017-09-14 ENCOUNTER — OFFICE VISIT (OUTPATIENT)
Dept: OTOLARYNGOLOGY | Facility: CLINIC | Age: 57
End: 2017-09-14

## 2017-09-14 DIAGNOSIS — J38.01 VOCAL FOLD PARALYSIS, LEFT: ICD-10-CM

## 2017-09-14 DIAGNOSIS — R49.0 DYSPHONIA: Primary | ICD-10-CM

## 2017-09-14 ASSESSMENT — PAIN SCALES - GENERAL: PAINLEVEL: NO PAIN (0)

## 2017-09-14 NOTE — LETTER
9/14/2017       RE: Jim Braden  [address] [address]     Dear Colleague,    Thank you for referring your patient, Jim Braden, to the [facility] EAR NOSE AND THROAT at [facility]. Please see a copy of my visit note below.        HISTORY OF PRESENT ILLNESS:  Mr. 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  He had on April 14, 2017, a laparoscopic transethmoidal esophagectomy, left tube thoracostomy and laparoscopic jejunal feeding tube placement.  On 4/24 he had esophageal gastrostomy, incision drainage of the left neck wound. Post procedure t here was an anastomotic leak and a vocal fold palsy.  He is doing relatively well and is able to move around.  He does have a voice and good articulation, but does have a weak quality to his voice.  He notes that he continues to have some indigestion, heartburn and chest pain.He had a Cymetra injection on 6/8/2017.  He had improved vocal straining for approximately a month for 6 weeks and a gradual deterioration over the next few weeks however his voice has stabilized and improved rate over the last month or 2. He has a stronger more effective cough does have some difficulties projecting his voice over background noise.      Last 2 Scores for Patient-Answered VHI Questionnaire  VHI Total Score 6/8/2017 9/14/2017   VHI Total Score 21 20       Last 2 Scores for Patient-Answered CSI Questionnaire  CSI Total Score 6/8/2017 9/14/2017   CSI Total Score 15 18         Last 2 Scores for Patient-Answered EAT Questionnaire  EAT Total Score 6/8/2017 9/14/2017   EAT Total Score 19 9           PAST MEDICAL HISTORY:   Past Medical History:   Diagnosis Date     Alpha-1-antitrypsin deficiency carrier (H)      Esophageal cancer (H)      GERD (gastroesophageal reflux disease)      HTN (hypertension)      Leg DVT (deep venous thromboembolism), acute, right (H)        PAST SURGICAL  HISTORY:   Past Surgical History:   Procedure Laterality Date     APPENDECTOMY       BRONCHOSCOPY FLEXIBLE N/A 4/24/2017    Procedure: BRONCHOSCOPY FLEXIBLE;;  Surgeon: Jeronimo Mendez MD;  Location: [facility]     COLONOSCOPY       ESOPHAGECTOMY N/A 4/14/2017    Procedure: ESOPHAGECTOMY;  Surgeon: Jeronimo Mendez MD;  [facility]     ESOPHAGOSCOPY, GASTROSCOPY, DUODENOSCOPY (EGD), COMBINED N/A 4/14/2017    Procedure: COMBINED ESOPHAGOSCOPY, GASTROSCOPY, DUODENOSCOPY (EGD);  Surgeon: Jeronimo Mendez MD;  [facility]     ESOPHAGOSCOPY, GASTROSCOPY, DUODENOSCOPY (EGD), COMBINED N/A 4/24/2017    Procedure: COMBINED ESOPHAGOSCOPY, GASTROSCOPY, DUODENOSCOPY (EGD);  Esophagogastroduodenoscopy;  Surgeon: Jeronimo Mendez MD;  [facility]     EXPLORE NECK Left 4/28/2017    Procedure: EXPLORE NECK;  Neck Wound Exploration and Washout, Esophagogastroduodenoscopy ;  Surgeon: Lucy Nobles MD;  [facility]     INCISION AND DRAINAGE NECK, COMBINED N/A 4/24/2017    Procedure: COMBINED INCISION AND DRAINAGE NECK;  with removal of pharangostomy tube;  Surgeon: Jeronimo Mendez MD;  [facility]     LAPAROSCOPIC ASSISTED INSERTION TUBE JEJUNOSTOMY N/A 4/14/2017    Procedure: LAPAROSCOPIC ASSISTED INSERTION TUBE JEJUNOSTOMY;  Surgeon: Jeronimo Mendez MD;  [facility]     ORTHOPEDIC SURGERY      Left arm after fracture     THORACOSCOPIC, LAPAROSCOPIC ESOPHAGECTOMY, COMBINED N/A 4/14/2017    Procedure: COMBINED THORACOSCOPIC, LAPAROSCOPIC ESOPHAGECTOMY;  Surgeon: Jeronimo Mendez MD;  [facility]       FAMILY HISTORY:   Family History   Problem Relation Age of Onset     Other - See Comments Mother      Alpha-1 Antitrypsin Deficiency     Dementia Father        SOCIAL HISTORY:   Social History   Substance Use Topics     Smoking status: Former Smoker     Packs/day: 1.00     Types: Cigarettes     Smokeless tobacco: Never Used      Comment: Quit 2007      Alcohol use 0.0 oz/week     0 Standard drinks or equivalent per week      Comment: 1-2 cans of beer day       REVIEW OF SYSTEMS: Ten point review of systems was performed and is negative except for: No flowsheet data found.     ALLERGIES: Review of patient's allergies indicates no known allergies.    MEDICATIONS:   Current Outpatient Prescriptions   Medication Sig Dispense Refill     pantoprazole (PROTONIX) SUSP suspension Take 20 mg by mouth daily Reported on 5/17/2017       CITALOPRAM HYDROBROMIDE PO Take 20 mg by mouth daily       LORAZEPAM PO Take 0.5 mg by mouth every 4 hours       diazepam (VALIUM) 1 MG/ML solution 1 mL (1 mg) by Per Feeding Tube route every 6 hours as needed for anxiety 120 mL 1     oxyCODONE (ROXICODONE) 5 MG/5ML solution Take 5 mLs (5 mg) by mouth every 4 hours as needed for moderate to severe pain 150 mL 0     bisacodyl (DULCOLAX) 10 MG Suppository Place 1 suppository (10 mg) rectally daily as needed for constipation 30 suppository 0     ondansetron (ZOFRAN-ODT) 4 MG ODT tab Take 1 tablet (4 mg) by mouth every 6 hours as needed for nausea 120 tablet 0     oxyCODONE (ROXICODONE) 5 MG/5ML solution 5 mLs (5 mg) by Per J Tube route every 4 hours as needed for moderate to severe pain 150 mL 0     phenol (CHLORASEPTIC) 1.4 % LIQD spray Take 1 spray (1 mL) by mouth every hour as needed for sore throat 177 mL 0     polyethylene glycol (MIRALAX/GLYCOLAX) Packet 17 g by Per Feeding Tube route daily 30 packet 0     sennosides (SENOKOT) 1.76 mg/mL syrup 5-10 mLs by Per J Tube route daily as needed for constipation 105 mL 0     acetaminophen (TYLENOL) 32 mg/mL solution 20.3 mLs (650 mg) by Per J Tube route every 4 hours as needed for other (surgical pain) 1000 mL 0     ACETAMINOPHEN PO Take 500-1,000 mg by mouth every 8 hours as needed for pain Reported on 5/17/2017       omeprazole (PRILOSEC) 20 MG CR capsule Take 20 mg by mouth every morning Reported on 5/17/2017       lisinopril  (PRINIVIL/ZESTRIL) 5 MG tablet Take 5 mg by mouth every evening Reported on 5/17/2017           PHYSICAL EXAMINATION:  He  is awake, alert and in no apparent distress.    His tympanic membranes are clear and intact bilaterally. External auditory canals are clear.  Nasal exam shows a mild septal deviation without obstruction.  Examination of the oral cavity shows no suspicious lesions.  There is symmetric movement of the tongue and soft palate.    The oropharynx is clear.  His neck is supple without significant adenopathy.  Pulse is regular.  Upper airway is clear.  Cranial nerves II-XII are grossly intact.       PROCEDURE: A flexible laryngoscopy  was performed.  Informed consent was obtained and a time out was performed. 3% lidocaine and 0.25% phenylephrine was sprayed into the nasal cavity and allowed 3 to 5 minutes for effect. The scope was passed through the left sided nostril. Examination showed the left  vocal fold to be immobile except for some subtle motion with respiration.  It is mildly bowed.  The right vocal fold is mobile and able to approximate the left vocal fold just off the midline.  There is some hyperfunction of the supraglottic larynx. No nodules, polyps or ulcerations are seen.  There is mild inflammation or erythema of the supraglottic or glottic larynx.  With phonation there is moderatecontraction of the supraglottic larynx.  The hypopharynx is otherwise clear as is the subglottis.     IMPRESSION: His voice has stabilized over the last month. His vocal quality is improved as compared with prior to injection and just below what he had after his injection. After thorough discussion we decided to delay any further injection and to have him follow-up in 3 months time to see if he has further recovery.      This report was dictated using Dragon voice recognition software. Please excuse any mistakes or omissions.         Again, thank you for allowing me to participate in the care of your patient.       Sincerely,    Lawrence Saunders MD

## 2017-09-14 NOTE — PATIENT INSTRUCTIONS
Plan of care:  Follow up with Dr Saunders in 3 months,  at 1200  Surgery would be called: thyroplasty  Clinic contact information:  1. To schedule an appointment call [phone], option 1  2. To talk to the Triage RN call [phone], option 3  3. If you need to speak to Pauline or get a message to your doctor on a Friday, call the triage RN  4. PaulineRN: [phone]  5. Surgery scheduling:      Fiona Nobles: [phone]      Yesenia Sam: [phone]  6. Fax: [phone]  7. [phone]

## 2017-09-14 NOTE — PROGRESS NOTES
HISTORY OF PRESENT ILLNESS:  Mr. 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  He had on April 14, 2017, a laparoscopic transethmoidal esophagectomy, left tube thoracostomy and laparoscopic jejunal feeding tube placement.  On 4/24 he had esophageal gastrostomy, incision drainage of the left neck wound. Post procedure there was an anastomotic leak and a vocal fold palsy.  He is doing relatively well and is able to move around.  He does have a voice and good articulation, but does have a weak quality to his voice.  He notes that he continues to have some indigestion, heartburn and chest pain.He had a Cymetra injection on 6/8/2017.  He had improved vocal straining for approximately a month for 6 weeks and a gradual deterioration over the next few weeks however his voice has stabilized and improved rate over the last month or 2. He has a stronger more effective cough does have some difficulties projecting his voice over background noise.      Last 2 Scores for Patient-Answered VHI Questionnaire  VHI Total Score 6/8/2017 9/14/2017   VHI Total Score 21 20       Last 2 Scores for Patient-Answered CSI Questionnaire  CSI Total Score 6/8/2017 9/14/2017   CSI Total Score 15 18         Last 2 Scores for Patient-Answered EAT Questionnaire  EAT Total Score 6/8/2017 9/14/2017   EAT Total Score 19 9           PAST MEDICAL HISTORY:   Past Medical History:   Diagnosis Date     Alpha-1-antitrypsin deficiency carrier (H)      Esophageal cancer (H)      GERD (gastroesophageal reflux disease)      HTN (hypertension)      Leg DVT (deep venous thromboembolism), acute, right (H)        PAST SURGICAL HISTORY:   Past Surgical History:   Procedure Laterality Date     APPENDECTOMY       BRONCHOSCOPY FLEXIBLE N/A 4/24/2017    Procedure: BRONCHOSCOPY FLEXIBLE;;  Surgeon: Jeronimo Mendez MD;  [facility]     COLONOSCOPY       ESOPHAGECTOMY N/A 4/14/2017    Procedure:  ESOPHAGECTOMY;  Surgeon: Jeronimo Mendez MD;  [facility]     ESOPHAGOSCOPY, GASTROSCOPY, DUODENOSCOPY (EGD), COMBINED N/A 4/14/2017    Procedure: COMBINED ESOPHAGOSCOPY, GASTROSCOPY, DUODENOSCOPY (EGD);  Surgeon: Jeronimo Mendez MD;  [facility]     ESOPHAGOSCOPY, GASTROSCOPY, DUODENOSCOPY (EGD), COMBINED N/A 4/24/2017    Procedure: COMBINED ESOPHAGOSCOPY, GASTROSCOPY, DUODENOSCOPY (EGD);  Esophagogastroduodenoscopy;  Surgeon: Jeronimo Mendez MD;  [facility]     EXPLORE NECK Left 4/28/2017    Procedure: EXPLORE NECK;  Neck Wound Exploration and Washout, Esophagogastroduodenoscopy ;  Surgeon: Lucy Nobles MD;  [facility]     INCISION AND DRAINAGE NECK, COMBINED N/A 4/24/2017    Procedure: COMBINED INCISION AND DRAINAGE NECK;  with removal of pharangostomy tube;  Surgeon: Jeronimo Mendez MD;  [facility]     LAPAROSCOPIC ASSISTED INSERTION TUBE JEJUNOSTOMY N/A 4/14/2017    Procedure: LAPAROSCOPIC ASSISTED INSERTION TUBE JEJUNOSTOMY;  Surgeon: Jeronimo Mendez MD;  [facility]     ORTHOPEDIC SURGERY      Left arm after fracture     THORACOSCOPIC, LAPAROSCOPIC ESOPHAGECTOMY, COMBINED N/A 4/14/2017    Procedure: COMBINED THORACOSCOPIC, LAPAROSCOPIC ESOPHAGECTOMY;  Surgeon: Jeronimo Mendez MD;  [facility]       FAMILY HISTORY:   Family History   Problem Relation Age of Onset     Other - See Comments Mother      Alpha-1 Antitrypsin Deficiency     Dementia Father        SOCIAL HISTORY:   Social History   Substance Use Topics     Smoking status: Former Smoker     Packs/day: 1.00     Types: Cigarettes     Smokeless tobacco: Never Used      Comment: Quit 2007     Alcohol use 0.0 oz/week     0 Standard drinks or equivalent per week      Comment: 1-2 cans of beer day       REVIEW OF SYSTEMS: Ten point review of systems was performed and is negative except for: No flowsheet data found.     ALLERGIES: Review of patient's allergies  indicates no known allergies.    MEDICATIONS:   Current Outpatient Prescriptions   Medication Sig Dispense Refill     pantoprazole (PROTONIX) SUSP suspension Take 20 mg by mouth daily Reported on 5/17/2017       CITALOPRAM HYDROBROMIDE PO Take 20 mg by mouth daily       LORAZEPAM PO Take 0.5 mg by mouth every 4 hours       diazepam (VALIUM) 1 MG/ML solution 1 mL (1 mg) by Per Feeding Tube route every 6 hours as needed for anxiety 120 mL 1     oxyCODONE (ROXICODONE) 5 MG/5ML solution Take 5 mLs (5 mg) by mouth every 4 hours as needed for moderate to severe pain 150 mL 0     bisacodyl (DULCOLAX) 10 MG Suppository Place 1 suppository (10 mg) rectally daily as needed for constipation 30 suppository 0     ondansetron (ZOFRAN-ODT) 4 MG ODT tab Take 1 tablet (4 mg) by mouth every 6 hours as needed for nausea 120 tablet 0     oxyCODONE (ROXICODONE) 5 MG/5ML solution 5 mLs (5 mg) by Per J Tube route every 4 hours as needed for moderate to severe pain 150 mL 0     phenol (CHLORASEPTIC) 1.4 % LIQD spray Take 1 spray (1 mL) by mouth every hour as needed for sore throat 177 mL 0     polyethylene glycol (MIRALAX/GLYCOLAX) Packet 17 g by Per Feeding Tube route daily 30 packet 0     sennosides (SENOKOT) 1.76 mg/mL syrup 5-10 mLs by Per J Tube route daily as needed for constipation 105 mL 0     acetaminophen (TYLENOL) 32 mg/mL solution 20.3 mLs (650 mg) by Per J Tube route every 4 hours as needed for other (surgical pain) 1000 mL 0     ACETAMINOPHEN PO Take 500-1,000 mg by mouth every 8 hours as needed for pain Reported on 5/17/2017       omeprazole (PRILOSEC) 20 MG CR capsule Take 20 mg by mouth every morning Reported on 5/17/2017       lisinopril (PRINIVIL/ZESTRIL) 5 MG tablet Take 5 mg by mouth every evening Reported on 5/17/2017           PHYSICAL EXAMINATION:  He  is awake, alert and in no apparent distress.    His tympanic membranes are clear and intact bilaterally. External auditory canals are clear.  Nasal exam shows a mild  septal deviation without obstruction.  Examination of the oral cavity shows no suspicious lesions.  There is symmetric movement of the tongue and soft palate.    The oropharynx is clear.  His neck is supple without significant adenopathy.  Pulse is regular.  Upper airway is clear.  Cranial nerves II-XII are grossly intact.       PROCEDURE: A flexible laryngoscopy  was performed.  Informed consent was obtained and a time out was performed. 3% lidocaine and 0.25% phenylephrine was sprayed into the nasal cavity and allowed 3 to 5 minutes for effect. The scope was passed through the left sided nostril. Examination showed the left  vocal fold to be immobile except for some subtle motion with respiration.  It is mildly bowed.  The right vocal fold is mobile and able to approximate the left vocal fold just off the midline.  There is some hyperfunction of the supraglottic larynx. No nodules, polyps or ulcerations are seen.  There is mild inflammation or erythema of the supraglottic or glottic larynx.  With phonation there is moderatecontraction of the supraglottic larynx.  The hypopharynx is otherwise clear as is the subglottis.     IMPRESSION: His voice has stabilized over the last month. His vocal quality is improved as compared with prior to injection and just below what he had after his injection. After thorough discussion we decided to delay any further injection and to have him follow-up in 3 months time to see if he has further recovery.      This report was dictated using Dragon voice recognition software. Please excuse any mistakes or omissions.

## 2017-09-14 NOTE — MR AVS SNAPSHOT
After Visit Summary   2017    Jim Braden    MRN: [id number]           Patient Information     Date Of Birth          1960        Visit Information        Provider Department      2017 12:30 PM Lawrence Saunders MD;  ENT PROCEDURE ROOM [facility] Ear Nose and Throat        Today's Diagnoses     Dysphonia    -  1      Care Instructions    Plan of care:  Follow up with Dr Saunders in 3 months,  at 1200  Surgery would be called: thyroplasty  Clinic contact information:  1. To schedule an appointment call [phone], option 1  2. To talk to the Triage RN call [phone], option 3  3. If you need to speak to Pauline or get a message to your doctor on a Friday, call the triage RN  4. PaulineRN: [phone]  5. Surgery scheduling:      Fiona Nobles: [phone]      Yesenia Flaco: [phone]  6. Fax: [phone]  7. [phone]            Follow-ups after your visit        Who to contact     Please call your clinic at [phone] to:    Ask questions about your health    Make or cancel appointments    Discuss your medicines    Learn about your test results    Speak to your doctor   If you have compliments or concerns about an experience at your clinic, or if you wish to file a complaint, please contact [facility] Patient Relations at [phone] or email us at [email or web address]         Additional Information About Your Visit        MyChart Information     [email or web address] is an electronic gateway that provides easy, online access to your medical records. With [email or web address], you can request a clinic appointment, read your test results, renew a prescription or communicate with your care team.     To sign up for [email or web address] visit the website at [email or web address]   You will be asked to enter the access code listed below, as well as some personal information. Please follow the directions to create your username and password.     Your  access code is: [email or web address]  Expires: 2017  6:30 AM     Your access code will  in 90 days. If you need help or a new code, please contact your [facility] or call [phone] for assistance.        Care EveryWhere ID     This is your Care EveryWhere ID. This could be used by other organizations to access your [place] medical records  [id number]         Blood Pressure from Last 3 Encounters:   17 126/81   17 126/72   17 (!) 165/94    Weight from Last 3 Encounters:   17 78.9 kg (174 lb)   17 78.1 kg (172 lb 1.6 oz)   17 78.7 kg (173 lb 7.6 oz)              We Performed the Following     IMAGESTREAM RECORDING ORDER        Primary Care Provider Office Phone # Fax #    Edith Dan [phone]       [address] [address]        Equal Access to Services     [facility]: Hadii aad ku hadasho Soomaali, waaxda luqadaha, qaybta kaalmada adeegyada, waxay idiin haydeyvi morales . So [facility] [phone].    ATENCIÓN: Si habla español, tiene a selby disposición servicios gratuitos de asistencia lingüística. Llame al [phone].    We comply with applicable federal civil rights laws and [place] laws. We do not discriminate on the basis of race, color, national origin, age, disability sex, sexual orientation or gender identity.            Thank you!     Thank you for choosing [facility] EAR NOSE AND THROAT  for your care. Our goal is always to provide you with excellent care. Hearing back from our patients is one way we can continue to improve our services. Please take a few minutes to complete the written survey that you may receive in the mail after your visit with us. Thank you!             Your Updated Medication List - Protect others around you: Learn how to safely use, store and throw away your medicines at www.disposemymeds.org.          This list is accurate as of: 17   1:12 PM.  Always use your most recent med list.                   Brand Name Dispense Instructions for use Diagnosis    * ACETAMINOPHEN PO      Take 500-1,000 mg by mouth every 8 hours as needed for pain Reported on 5/17/2017        * acetaminophen 32 mg/mL solution    TYLENOL    1000 mL    20.3 mLs (650 mg) by Per J Tube route every 4 hours as needed for other (surgical pain)    History of esophagectomy       bisacodyl 10 MG Suppository    DULCOLAX    30 suppository    Place 1 suppository (10 mg) rectally daily as needed for constipation    History of esophagectomy       CITALOPRAM HYDROBROMIDE PO      Take 20 mg by mouth daily        diazepam 1 MG/ML solution    VALIUM    120 mL    1 mL (1 mg) by Per Feeding Tube route every 6 hours as needed for anxiety    Anxiety       lisinopril 5 MG tablet    PRINIVIL/ZESTRIL     Take 5 mg by mouth every evening Reported on 5/17/2017        LORAZEPAM PO      Take 0.5 mg by mouth every 4 hours        omeprazole 20 MG CR capsule    priLOSEC     Take 20 mg by mouth every morning Reported on 5/17/2017        ondansetron 4 MG ODT tab    ZOFRAN-ODT    120 tablet    Take 1 tablet (4 mg) by mouth every 6 hours as needed for nausea    History of esophagectomy       * oxyCODONE 5 MG/5ML solution    ROXICODONE    150 mL    5 mLs (5 mg) by Per J Tube route every 4 hours as needed for moderate to severe pain    History of esophagectomy       * oxyCODONE 5 MG/5ML solution    ROXICODONE    150 mL    Take 5 mLs (5 mg) by mouth every 4 hours as needed for moderate to severe pain    Post-operative pain       pantoprazole Susp suspension    PROTONIX     Take 20 mg by mouth daily Reported on 5/17/2017        phenol 1.4 % Liqd spray    CHLORASEPTIC    177 mL    Take 1 spray (1 mL) by mouth every hour as needed for sore throat    History of esophagectomy       polyethylene glycol Packet    MIRALAX/GLYCOLAX    30 packet    17 g by Per Feeding Tube route daily    History of esophagectomy        sennosides 8.8 MG/5ML syrup    SENOKOT    105 mL    5-10 mLs by Per J Tube route daily as needed for constipation    History of esophagectomy       * Notice:  This list has 4 medication(s) that are the same as other medications prescribed for you. Read the directions carefully, and ask your doctor or other care provider to review them with you.

## 2017-10-26 ENCOUNTER — TELEPHONE (OUTPATIENT)
Dept: SURGERY | Facility: CLINIC | Age: 57
End: 2017-10-26

## 2017-10-26 NOTE — TELEPHONE ENCOUNTER
Patient left VM asking me to arrange for PEG removal, saying he is maintaining his weight and not using it.   He didn't say how long it has been since using it.  I am sending a message to ENT coordinators Papa and Ghazala so they can talk to Dr. Saunders to be sure he has no reservations about removing this at this time.  Will await a message back from ENT.   I also tried calling Jim back, left him a VM telling him I was checking with ENT because I cannot just take it out without the OK from his provider.    ADDENDUM:   Had another VM from Jim saying that he sees Dr. Edith Dan at [facility].   He said he was pretty sure she would give the OK for PEG removal.  I called Dr. Dan and left message, will await her call back with further direction/instructions.      ADDENDUM:   Received VM from [facility] nurse who works with Dr. Dan.   She said Jim has disease progression on last scan and is being sent for a liver biopsy, advised that PEG tube should not be removed at this time.   She said patient is aware of this change of plan.

## 2021-01-12 NOTE — PLAN OF CARE
Problem: Goal Outcome Summary  Goal: Goal Outcome Summary  Outcome: Improving  Pt alert and oriented with stable vitals.  Pt weaned back off O2 this shift.  Sats >90%.  Pt up in halls, chair, and room.  Pain managed with scheduled oxy and tylenol.  No nausea.  J tube clamped all day.  Tube feeds to start this afternoon.  1 very small bowel movement today.  Adequate urine output.  Wife at the bedside now.  Will continue with plan of care as ordered and notify team prn           Quality 47: Advance Care Plan: Advance Care Planning discussed and documented; advance care plan or surrogate decision maker documented in the medical record. Quality 226: Preventive Care And Screening: Tobacco Use: Screening And Cessation Intervention: Patient screened for tobacco use and is an ex/non-smoker Quality 130: Documentation Of Current Medications In The Medical Record: Current Medications Documented Quality 431: Preventive Care And Screening: Unhealthy Alcohol Use - Screening: Patient screened for unhealthy alcohol use using a single question and scores less than 2 times per year Quality 111:Pneumonia Vaccination Status For Older Adults: Pneumococcal Vaccination Previously Received Detail Level: Generalized Quality 110: Preventive Care And Screening: Influenza Immunization: Influenza Immunization Administered during Influenza season

## (undated) DEVICE — SU SILK 2-0 TIE 12X30" A305H

## (undated) DEVICE — SU VICRYL 1 CT-1 27" UND J261H

## (undated) DEVICE — DRSG DRAIN 2X2" 7087

## (undated) DEVICE — NDL 18GAX1.5" 305185

## (undated) DEVICE — SUCTION DRY CHEST DRAIN OASIS 3600-100

## (undated) DEVICE — DRAPE IOBAN INCISE 23X17" 6650EZ

## (undated) DEVICE — DRAPE LAP W/ARMBOARD 29410

## (undated) DEVICE — SPONGE RAY-TEC 4X8" 7318

## (undated) DEVICE — SYR BULB IRRIG 50ML LATEX FREE 0035280

## (undated) DEVICE — DRAIN CHEST TUBE 20FR STR 8020

## (undated) DEVICE — STPL ENDO RELOAD 60MM MEDIUM THICK PURPLE EGIA60AMT

## (undated) DEVICE — SU MONOCRYL 4-0 PS-2 27" UND Y426H

## (undated) DEVICE — BLADE KNIFE SURG 11 371111

## (undated) DEVICE — ENDO TROCAR 05MM VERSAONE BLADELESS W/STD FIX CAN NONB5STF

## (undated) DEVICE — WIPES FOLEY CARE SURESTEP PROVON DFC100

## (undated) DEVICE — LINEN GOWN XLG 5407

## (undated) DEVICE — CATH TRAY FOLEY SURESTEP 16FR W/URNE MTR STLK LATEX A303316A

## (undated) DEVICE — Device

## (undated) DEVICE — DRSG GAUZE 4X4" 2187

## (undated) DEVICE — SPONGE KITTNER 30-101

## (undated) DEVICE — NDL COUNTER 20CT 31142493

## (undated) DEVICE — APPLICATOR COTTON TIP 6"X2 STERILE LF 6012

## (undated) DEVICE — SU ENDO STITCH SURGIDAC 2-0 ES-9 7" TRIPLE STITCH 170041

## (undated) DEVICE — SOL WATER IRRIG 1000ML BOTTLE 2F7114

## (undated) DEVICE — SU VICRYL 4-0 PS-2 18" UND J496H

## (undated) DEVICE — SYR PISTON URETHRAL 60ML 68000

## (undated) DEVICE — CATH VA INTR 14FRX14CM KIT LATEX 612613

## (undated) DEVICE — ENDO BITE BLOCK ADULT OMNI-BLOC

## (undated) DEVICE — SOL NACL 0.9% IRRIG 1000ML BOTTLE 2F7124

## (undated) DEVICE — DRSG TEGADERM 2 3/8X2 3/4" 1624W

## (undated) DEVICE — CONNECTOR BLAKE DRAIN SGL BCC1

## (undated) DEVICE — SU VICRYL 2-0 SH 27" UND J417H

## (undated) DEVICE — GOWN IMPERVIOUS 2XL BLUE

## (undated) DEVICE — GUIDEWIRE AMPLATZ 0.035"X260CM XSTFF STR THSF-35-260-AES-BH

## (undated) DEVICE — ENDO TUBING CO2 SMARTCAP STERILE DISP 100145CO2EXT

## (undated) DEVICE — SPONGE TONSIL W/STRING MED 23275-680

## (undated) DEVICE — ENDO CONNECTOR ENDOGATOR AUX WATER JET FOR OLYMPUS SCOPE

## (undated) DEVICE — BLADE CLIPPER SGL USE 9680

## (undated) DEVICE — BLADE KNIFE SURG 10 371110

## (undated) DEVICE — ENDO TROCAR OPTICAL 12MM VERSAONE W/STD FIX CAN UNVCA12STF

## (undated) DEVICE — ENDO VALVE BX EVIS MAJ-210

## (undated) DEVICE — STPL ENDO RELOAD 45MM MEDIUM THICK PURPLE EGIA45AMT

## (undated) DEVICE — SUCTION TIP YANKAUER STR K87

## (undated) DEVICE — WIRE GUIDE AMPLATZ SUPER STIFF 0.035"X180CM 46-501

## (undated) DEVICE — TUBE JEJUNOSTOMY 12FR  0200-12LV

## (undated) DEVICE — DRSG ABDOMINAL 07 1/2X8" 7197D

## (undated) DEVICE — SU VICRYL 0 UR-6 27" J603H

## (undated) DEVICE — DEVICE ENDO STITCH APPLIER 10MM 173016

## (undated) DEVICE — STPL SKIN 35W 059037

## (undated) DEVICE — GLOVE PROTEXIS POWDER FREE 8.0 ORTHOPEDIC 2D73ET80

## (undated) DEVICE — DRSG KERLIX 4 1/2"X4YDS ROLL 6715

## (undated) DEVICE — SU VICRYL 2-0 CT-2 27" J333H

## (undated) DEVICE — SU PROLENE 4-0 RB-1DA 36" 8557H

## (undated) DEVICE — LINEN TOWEL PACK X30 5481

## (undated) DEVICE — SU SILK 0 TIE 6X30" A306H

## (undated) DEVICE — KIT ENDO FIRST STEP DISINFECTANT 200ML W/POUCH EP-4

## (undated) DEVICE — ENDO TUBING INSUFFLATOR CO2 EFFICIENT 710324

## (undated) DEVICE — ENDO SYSTEM WATER BOTTLE & TUBING W/CO2 FILTER 00711549

## (undated) DEVICE — PACK GOWN 3/PK DISP XL SBA32GPFCB

## (undated) DEVICE — SUCTION MANIFOLD DORNOCH ULTRA CART UL-CL500

## (undated) DEVICE — LINEN TOWEL PACK X5 5464

## (undated) DEVICE — SU SILK 3-0 SH CR 8X18" C013D

## (undated) DEVICE — TUBING SUCTION 10'X3/16" N510

## (undated) DEVICE — CUP AND LID 2PK 2OZ STERILE  SSK9006A

## (undated) DEVICE — SU SILK 2-0 SH 30" K833H

## (undated) DEVICE — COVER CAMERA IN-LIGHT DISP LT-C02

## (undated) DEVICE — ENDO CAP AND TUBING STERILE FOR ENDOGATOR  100130

## (undated) DEVICE — DRAIN CHEST TUBE 24FR STR 8024

## (undated) DEVICE — ENDO VALVE SUCTION BRONCH EVIS MAJ-209

## (undated) DEVICE — NDL 25GA 2"  8881200441

## (undated) DEVICE — STPL ENDO HANDLE GIA ULTRA UNIVERSAL STD EGIAUSTND

## (undated) DEVICE — GLOVE PROTEXIS MICRO 6.0  2D73PM60

## (undated) DEVICE — LIGHT HANDLE X1 31140133

## (undated) DEVICE — SYR 30ML SLIP TIP W/O NDL 302833

## (undated) DEVICE — SU VICRYL 3-0 SH 27" UND J416H

## (undated) DEVICE — SU VICRYL 3-0 SH 27" J316H

## (undated) DEVICE — STPL ENDO GIA 60X3.5MM STRAIGHT 030414

## (undated) DEVICE — JELLY LUBRICATING SURGILUBE 2OZ TUBE

## (undated) DEVICE — SU ENDO STITCH SURGIDAC 2-0 ES-9 TAPER 48"  170044

## (undated) DEVICE — DRSG MEDIPORE 3 1/2X13 3/4" 3573

## (undated) DEVICE — PREP CHLORAPREP 26ML TINTED ORANGE  260815

## (undated) DEVICE — SU DERMABOND ADVANCED .7ML DNX12

## (undated) DEVICE — ESU PENCIL W/COATED BLADE E2450H

## (undated) DEVICE — SU VICRYL 3-0 SH CR 8X18" J774

## (undated) DEVICE — LINEN TOWEL PACK X6 WHITE 5487

## (undated) DEVICE — ESU LIGASURE MARYLAND JAW OPEN SEALER/DVDR 5MMX37CM LF1737

## (undated) DEVICE — SYR 10ML LL W/O NDL

## (undated) DEVICE — DRAIN PENROSE 1/4X18" LATEX

## (undated) DEVICE — ESU GROUND PAD ADULT W/CORD E7507

## (undated) DEVICE — ENDO TROCAR 12MM VERSAONE BLADELESS W/STD FIX CAN NONB12STF

## (undated) DEVICE — TUBING INSUFFLATION W/FILTER CPC TO LUER 620-030-301

## (undated) DEVICE — SU SILK 0 SH 30" K834H

## (undated) RX ORDER — FENTANYL CITRATE 50 UG/ML
INJECTION, SOLUTION INTRAMUSCULAR; INTRAVENOUS
Status: DISPENSED
Start: 2017-04-14

## (undated) RX ORDER — SODIUM CHLORIDE, SODIUM LACTATE, POTASSIUM CHLORIDE, CALCIUM CHLORIDE 600; 310; 30; 20 MG/100ML; MG/100ML; MG/100ML; MG/100ML
INJECTION, SOLUTION INTRAVENOUS
Status: DISPENSED
Start: 2017-04-14

## (undated) RX ORDER — HYDROMORPHONE HYDROCHLORIDE 1 MG/ML
INJECTION, SOLUTION INTRAMUSCULAR; INTRAVENOUS; SUBCUTANEOUS
Status: DISPENSED
Start: 2017-04-14

## (undated) RX ORDER — PIPERACILLIN SODIUM, TAZOBACTAM SODIUM 3; .375 G/15ML; G/15ML
INJECTION, POWDER, LYOPHILIZED, FOR SOLUTION INTRAVENOUS
Status: DISPENSED
Start: 2017-04-14

## (undated) RX ORDER — HYDROMORPHONE HYDROCHLORIDE 1 MG/ML
INJECTION, SOLUTION INTRAMUSCULAR; INTRAVENOUS; SUBCUTANEOUS
Status: DISPENSED
Start: 2017-04-24

## (undated) RX ORDER — CEFAZOLIN SODIUM 2 G/100ML
INJECTION, SOLUTION INTRAVENOUS
Status: DISPENSED
Start: 2017-04-24

## (undated) RX ORDER — LABETALOL HYDROCHLORIDE 5 MG/ML
INJECTION, SOLUTION INTRAVENOUS
Status: DISPENSED
Start: 2017-04-24

## (undated) RX ORDER — HEPARIN SODIUM,PORCINE 10 UNIT/ML
VIAL (ML) INTRAVENOUS
Status: DISPENSED
Start: 2017-04-19

## (undated) RX ORDER — IOPAMIDOL 510 MG/ML
INJECTION, SOLUTION INTRAVASCULAR
Status: DISPENSED
Start: 2017-04-24